# Patient Record
Sex: FEMALE | Race: WHITE | NOT HISPANIC OR LATINO | Employment: OTHER | URBAN - METROPOLITAN AREA
[De-identification: names, ages, dates, MRNs, and addresses within clinical notes are randomized per-mention and may not be internally consistent; named-entity substitution may affect disease eponyms.]

---

## 2017-01-27 ENCOUNTER — ANESTHESIA (OUTPATIENT)
Dept: GASTROENTEROLOGY | Facility: HOSPITAL | Age: 62
End: 2017-01-27

## 2017-01-27 ENCOUNTER — ANESTHESIA EVENT (OUTPATIENT)
Dept: GASTROENTEROLOGY | Facility: HOSPITAL | Age: 62
End: 2017-01-27

## 2017-01-27 ENCOUNTER — HOSPITAL ENCOUNTER (OUTPATIENT)
Facility: HOSPITAL | Age: 62
Setting detail: HOSPITAL OUTPATIENT SURGERY
Discharge: HOME | End: 2017-01-27
Attending: INTERNAL MEDICINE | Admitting: INTERNAL MEDICINE

## 2017-01-27 ENCOUNTER — ON CAMPUS - OUTPATIENT (OUTPATIENT)
Dept: URBAN - METROPOLITAN AREA HOSPITAL 114 | Facility: HOSPITAL | Age: 62
End: 2017-01-27

## 2017-01-27 VITALS
HEIGHT: 67 IN | SYSTOLIC BLOOD PRESSURE: 121 MMHG | HEART RATE: 53 BPM | OXYGEN SATURATION: 93 % | RESPIRATION RATE: 16 BRPM | DIASTOLIC BLOOD PRESSURE: 82 MMHG | TEMPERATURE: 98.3 F | WEIGHT: 184.1 LBS | BODY MASS INDEX: 28.9 KG/M2

## 2017-01-27 DIAGNOSIS — K22.4 DYSKINESIA OF ESOPHAGUS: ICD-10-CM

## 2017-01-27 DIAGNOSIS — F45.8 OTHER SOMATOFORM DISORDERS: ICD-10-CM

## 2017-01-27 DIAGNOSIS — R13.10 DYSPHAGIA: ICD-10-CM

## 2017-01-27 DIAGNOSIS — K29.50 UNSPECIFIED CHRONIC GASTRITIS WITHOUT BLEEDING: ICD-10-CM

## 2017-01-27 DIAGNOSIS — R13.14 DYSPHAGIA, PHARYNGOESOPHAGEAL PHASE: ICD-10-CM

## 2017-01-27 DIAGNOSIS — R12 HEARTBURN: ICD-10-CM

## 2017-01-27 DIAGNOSIS — K21.9 GERD (GASTROESOPHAGEAL REFLUX DISEASE): ICD-10-CM

## 2017-01-27 DIAGNOSIS — K22.2 ESOPHAGEAL OBSTRUCTION: ICD-10-CM

## 2017-01-27 DIAGNOSIS — K30 FUNCTIONAL DYSPEPSIA: ICD-10-CM

## 2017-01-27 PROCEDURE — 25010000002 MIDAZOLAM PER 1 MG: Performed by: ANESTHESIOLOGY

## 2017-01-27 PROCEDURE — 43239 EGD BIOPSY SINGLE/MULTIPLE: CPT | Performed by: INTERNAL MEDICINE

## 2017-01-27 PROCEDURE — 25010000002 FENTANYL CITRATE (PF) 100 MCG/2ML SOLUTION: Performed by: ANESTHESIOLOGY

## 2017-01-27 PROCEDURE — 25010000002 PROPOFOL 10 MG/ML EMULSION: Performed by: ANESTHESIOLOGY

## 2017-01-27 PROCEDURE — 88312 SPECIAL STAINS GROUP 1: CPT | Performed by: INTERNAL MEDICINE

## 2017-01-27 PROCEDURE — 43450 DILATE ESOPHAGUS 1/MULT PASS: CPT | Performed by: INTERNAL MEDICINE

## 2017-01-27 PROCEDURE — 88305 TISSUE EXAM BY PATHOLOGIST: CPT | Performed by: INTERNAL MEDICINE

## 2017-01-27 RX ORDER — MIDAZOLAM HYDROCHLORIDE 1 MG/ML
INJECTION INTRAMUSCULAR; INTRAVENOUS AS NEEDED
Status: DISCONTINUED | OUTPATIENT
Start: 2017-01-27 | End: 2017-01-27 | Stop reason: SURG

## 2017-01-27 RX ORDER — LIDOCAINE HYDROCHLORIDE 20 MG/ML
INJECTION, SOLUTION INFILTRATION; PERINEURAL AS NEEDED
Status: DISCONTINUED | OUTPATIENT
Start: 2017-01-27 | End: 2017-01-27 | Stop reason: SURG

## 2017-01-27 RX ORDER — PROPOFOL 10 MG/ML
VIAL (ML) INTRAVENOUS AS NEEDED
Status: DISCONTINUED | OUTPATIENT
Start: 2017-01-27 | End: 2017-01-27 | Stop reason: SURG

## 2017-01-27 RX ORDER — SODIUM CHLORIDE, SODIUM LACTATE, POTASSIUM CHLORIDE, CALCIUM CHLORIDE 600; 310; 30; 20 MG/100ML; MG/100ML; MG/100ML; MG/100ML
30 INJECTION, SOLUTION INTRAVENOUS CONTINUOUS PRN
Status: DISCONTINUED | OUTPATIENT
Start: 2017-01-27 | End: 2017-01-27 | Stop reason: HOSPADM

## 2017-01-27 RX ORDER — FENTANYL CITRATE 50 UG/ML
INJECTION, SOLUTION INTRAMUSCULAR; INTRAVENOUS AS NEEDED
Status: DISCONTINUED | OUTPATIENT
Start: 2017-01-27 | End: 2017-01-27 | Stop reason: SURG

## 2017-01-27 RX ADMIN — SODIUM CHLORIDE, POTASSIUM CHLORIDE, SODIUM LACTATE AND CALCIUM CHLORIDE 30 ML/HR: 600; 310; 30; 20 INJECTION, SOLUTION INTRAVENOUS at 07:36

## 2017-01-27 RX ADMIN — PROPOFOL 150 MG: 10 INJECTION, EMULSION INTRAVENOUS at 08:02

## 2017-01-27 RX ADMIN — LIDOCAINE HYDROCHLORIDE 40 MG: 20 INJECTION, SOLUTION INFILTRATION; PERINEURAL at 08:02

## 2017-01-27 RX ADMIN — FENTANYL CITRATE 50 MCG: 50 INJECTION INTRAMUSCULAR; INTRAVENOUS at 08:02

## 2017-01-27 RX ADMIN — MIDAZOLAM HYDROCHLORIDE 1 MG: 1 INJECTION, SOLUTION INTRAMUSCULAR; INTRAVENOUS at 08:02

## 2017-01-27 RX ADMIN — SODIUM CHLORIDE, POTASSIUM CHLORIDE, SODIUM LACTATE AND CALCIUM CHLORIDE: 600; 310; 30; 20 INJECTION, SOLUTION INTRAVENOUS at 08:00

## 2017-01-27 NOTE — CONSULTS
Consults    Patient Care Team:  Levon Barber MD as PCP - General  Levon Barber MD as PCP - Family Medicine    Chief complaint: gerd,     Subjective     History of Present Illness  Some swallowing issues food feels like it is hanging but goes down.  Review of Systems   All other systems reviewed and are negative.       Past Medical History   Diagnosis Date   • Abnormal LFTs    • Abnormal liver function tests 10/11/2016   • Carotid artery plaque      lifeline screen only not noted on formal screen at [facility] 2014   • Cervical disc disorder, unspecified, unspecified cervical region      djd/ddd c5-c6 fusion epiderals   • Essential tremor 10/17/2016   • History of peripheral neuropathy      sees Dr. Mantilla sx only on L side   • Liver hemangioma      on CT scan x 2 in 2015   • Localized osteoarthritis of hand    • Mucocele, appendix    • Osteoarthritis, localized, knee    • Osteopenia      noted on lifeline screen not seen on formal hip/L-spine DEXA in 11/2014   • PONV (postoperative nausea and vomiting)    • Post herpetic neuralgia    • Postherpetic neuralgia 10/17/2016   • Urge incontinence of urine    ,   Past Surgical History   Procedure Laterality Date   • Appendectomy       11/11/15 due to mucocoele   • Eye surgery       cataract surgery B 2012   • Cervical fusion     • Lasik       bilateral   • Hysterectomy       pt has ovaries MARION only   • Soft tissue cyst excision     • Ostectomy       navicular   • Gallbladder surgery       02/2016   • Skin biopsy     ,   Family History   Problem Relation Age of Onset   • Hypertension Mother    • Hypothyroidism Mother    • Leukemia Father      chronic lymphocytic   • Hypertension Father    • Breast cancer Sister    • Hypertension Sister    • Stroke Sister    • Depression Brother    • Hypertension Brother    • Colon cancer Maternal Grandmother    • Cancer Maternal Grandmother    ,   Social History   Substance Use Topics   • Smoking status: Former Smoker   • Smokeless  tobacco: None      Comment: 5 pack/year history; quit in 20's.    • Alcohol use Yes      Comment: 5 beers/ week (1-2 day/ time)   ,   Prescriptions Prior to Admission   Medication Sig Dispense Refill Last Dose   • aspirin 81 MG tablet Take  by mouth Daily.   1/21/2017   • B Complex-C (SUPER B COMPLEX/VITAMIN C PO) Take  by mouth.   1/21/2017   • Calcium Carb-Cholecalciferol (CALCIUM + D3) 600-200 MG-UNIT tablet Take  by mouth.   1/21/2017   • Cholecalciferol 400 UNITS tablet Take  by mouth.   1/21/2017   • Coenzyme Q10 (COQ10) 200 MG capsule Take  by mouth.   1/21/2017   • gabapentin (NEURONTIN) 800 MG tablet Take  by mouth 3 (Three) Times a Day.   1/26/2017   • Magnesium 100 MG tablet Take  by mouth.   1/21/2017   • Misc Natural Products (GLUCOS-CHONDROIT-MSM COMPLEX) tablet Take  by mouth.   1/21/2017   • MULTIPLE VITAMINS ESSENTIAL PO Take  by mouth.   1/21/2017   • Omega-3 Fatty Acids (FISH OIL) 1000 MG capsule capsule Take  by mouth Daily.   1/21/2017   , Scheduled Meds:   , Continuous Infusions:    lactated ringers 30 mL/hr Last Rate: 30 mL/hr (01/27/17 0736)   , PRN Meds:  •  lactated ringers and Allergies:  Review of patient's allergies indicates no known allergies.    Objective      Vital Signs  Temp:  [98.3 °F (36.8 °C)] 98.3 °F (36.8 °C)  Resp:  [18] 18  BP: (125)/(88) 125/88    Physical Exam   Constitutional: She is oriented to person, place, and time. She appears well-developed and well-nourished.   HENT:   Mouth/Throat: Oropharynx is clear and moist.   Eyes: Conjunctivae are normal.   Neck: Neck supple.   Cardiovascular: Normal rate and regular rhythm.    Pulmonary/Chest: Effort normal and breath sounds normal.   Abdominal: Soft. Bowel sounds are normal.   Neurological: She is alert and oriented to person, place, and time.   Skin: Skin is warm.   Psychiatric: She has a normal mood and affect.       Results Review:    None        Assessment/Plan     Active Problems:    * No active hospital problems.  *      Assessment:  (Globus  Dysphagia  gerd  bloating).     Plan:   (Upper endoscopy Risks, alternatives and benefits discussed with patient and patient is agreeable to proceed. ).       I discussed the patients findings and my recommendations with patient and nursing staff    Kofi Arredondo MD  01/27/17  8:21 AM

## 2017-01-27 NOTE — IP AVS SNAPSHOT
AFTER VISIT SUMMARY             Noemi Fowler           About your hospitalization     You were admitted on:  January 27, 2017 You last received care in the:  [facility] ENDO SUITES       Procedures & Surgeries      Procedure(s) (LRB):  ESOPHAGOGASTRODUODENOSCOPY WITH DILATATION GONZALEZ 52 FR,WITH BIOPSY (N/A)     1/27/2017     Surgeon(s):  Kofi Arredondo MD  -------------------      Medications    If you or your caregiver advised us that you are currently taking a medication and that medication is marked below as “Resume”, this simply indicates that we have reviewed those medications to make sure our new therapy recommendations do not interfere.  If you have concerns about medications other than those new ones which we are prescribing today, please consult the physician who prescribed them (or your primary physician).  Our review of your home medications is not meant to indicate that we are directing their use.             Your Medications      ASK your doctor about these medications     aspirin 81 MG tablet   Take  by mouth Daily.           Calcium + D3 600-200 MG-UNIT tablet   Take  by mouth.           Cholecalciferol 400 UNITS tablet   Take  by mouth.           CoQ10 200 MG capsule   Take  by mouth.           fish oil 1000 MG capsule capsule   Take  by mouth Daily.           gabapentin 800 MG tablet   Take  by mouth 3 (Three) Times a Day.   Commonly known as:  NEURONTIN           GLUCOS-CHONDROIT-MSM COMPLEX tablet   Take  by mouth.           Magnesium 100 MG tablet   Take  by mouth.           MULTIPLE VITAMINS ESSENTIAL PO   Take  by mouth.           SUPER B COMPLEX/VITAMIN C PO   Take  by mouth.                      Your Medications      ASK your doctor about these medications           Morning Noon Evening Bedtime As Needed    aspirin 81 MG tablet   Take  by mouth Daily.                                Calcium + D3 600-200 MG-UNIT tablet   Take  by mouth.                                Cholecalciferol 400 UNITS tablet   Take  by mouth.                                CoQ10 200 MG capsule   Take  by mouth.                                fish oil 1000 MG capsule capsule   Take  by mouth Daily.                                gabapentin 800 MG tablet   Take  by mouth 3 (Three) Times a Day.   Commonly known as:  NEURONTIN                                GLUCOS-CHONDROIT-MSM COMPLEX tablet   Take  by mouth.                                Magnesium 100 MG tablet   Take  by mouth.                                MULTIPLE VITAMINS ESSENTIAL PO   Take  by mouth.                                SUPER B COMPLEX/VITAMIN C PO   Take  by mouth.                                         Instructions for After Discharge        Discharge References/Attachments     GASTRITIS, ADULT, EASY-TO-READ (ENGLISH)    ESOPHAGOGASTRODUODENOSCOPY, CARE AFTER (ENGLISH)       Follow-ups for After Discharge        Scheduled Appointments     Mar 16, 2017  8:30 AM EDT   [facility] with LABDAYAN COLVIN   [facility] INTERNAL MEDICINE (--)    [address] [id number] [phone]            Mar 23, 2017  8:20 AM EDT   Office Visit with Levon Barber MD   [facility] INTERNAL MEDICINE (--)    [address] [address] [phone]           Arrive 15 minutes prior to appointment.              Gro[employer]ie Signup     Our records indicate that you have an active [employer] account.    You can view your After Visit Summary by going to [employer] and logging in with your Groopie username and password.  If you don't have a Groopie username and password but a parent or guardian has access to your record, the parent or guardian should login with their own Groopie username and password and access your record to view the After Visit Summary.    If you have questions, you can email [email or web address] or call [phone] to talk to  our MyChart staff.  Remember, MyChart is NOT to be used for urgent needs.  For medical emergencies, dial 911.           Summary of Your Hospitalization        Reason for Hospitalization     Your primary diagnosis was:  Not on File      Care Providers     Provider Service Role Specialty    Kofi Arredondo MD Gastroenterology Attending Provider Gastroenterology    Kofi Arredondo MD Gastroenterology Surgeon  Gastroenterology      Your Allergies  Date Reviewed: 1/27/2017    No active allergies      Pending Labs     Order Current Status    Tissue Exam Collected (01/27/17 0820)      Patient Belongings Returned     Document Return of Belongings Flowsheet     Were the patient bedside belongings sent home?   --   Belongings Retrieved from Security & Sent Home   --    Belongings Sent to Safe   --   Medications Retrieved from Pharmacy & Sent Home   --              More Information      Gastritis, Adult  Gastritis is soreness and puffiness (inflammation) of the lining of the stomach. If you do not get help, gastritis can cause bleeding and sores (ulcers) in the stomach.  HOME CARE   · Only take medicine as told by your doctor.  · If you were given antibiotic medicines, take them as told. Finish the medicines even if you start to feel better.  · Drink enough fluids to keep your pee (urine) clear or pale yellow.  · Avoid foods and drinks that make your problems worse. Foods you may want to avoid include:    Caffeine or alcohol.    Chocolate.    Mint.    Garlic and onions.    Spicy foods.    Citrus fruits, including oranges, sophia, or limes.    Food containing tomatoes, including sauce, chili, salsa, and pizza.    Fried and fatty foods.  · Eat small meals throughout the day instead of large meals.  GET HELP RIGHT AWAY IF:   · You have black or dark red poop (stools).  · You throw up (vomit) blood. It may look like coffee grounds.  · You cannot keep fluids down.  · Your belly (abdominal) pain gets worse.  · You have a  fever.  · You do not feel better after 1 week.  · You have any other questions or concerns.  MAKE SURE YOU:   · Understand these instructions.  · Will watch your condition.  · Will get help right away if you are not doing well or get worse.     This information is not intended to replace advice given to you by your health care provider. Make sure you discuss any questions you have with your health care provider.     Document Released: 06/05/2009 Document Revised: 03/11/2013 Document Reviewed: 01/30/2013  [employer] Interactive Patient Education ©2016 Elsevier Inc.          Esophagogastroduodenoscopy, Care After  Refer to this sheet in the next few weeks. These instructions provide you with information about caring for yourself after your procedure. Your health care provider may also give you more specific instructions. Your treatment has been planned according to current medical practices, but problems sometimes occur. Call your health care provider if you have any problems or questions after your procedure.  WHAT TO EXPECT AFTER THE PROCEDURE  After your procedure, it is typical to feel:  · Soreness in your throat.  · Pain with swallowing.  · Sick to your stomach (nauseous).  · Bloated.  · Dizzy.  · Fatigued.  HOME CARE INSTRUCTIONS  · Do not eat or drink anything until the numbing medicine (local anesthetic) has worn off and your gag reflex has returned. You will know that the local anesthetic has worn off when you can swallow comfortably.  · Do not drive or operate machinery until directed by your health care provider.  · Take medicines only as directed by your health care provider.  SEEK MEDICAL CARE IF:   · You cannot stop coughing.  · You are not urinating at all or less than usual.  SEEK IMMEDIATE MEDICAL CARE IF:  · You have difficulty swallowing.  · You cannot eat or drink.  · You have worsening throat or chest pain.  · You have dizziness or lightheadedness or you faint.  · You have nausea or vomiting.  · You  have chills.  · You have a fever.  · You have severe abdominal pain.  · You have black, tarry, or bloody stools.     This information is not intended to replace advice given to you by your health care provider. Make sure you discuss any questions you have with your health care provider.     Document Released: 12/04/2013 Document Revised: 01/08/2016 Document Reviewed: 12/04/2013  [facility] Interactive Patient Education ©2016 Elsevier Inc.            SYMPTOMS OF A STROKE    Call 911 or have someone take you to the Emergency Department if you have any of the following:    · Sudden numbness or weakness of your face, arm or leg especially on one side of the body  · Sudden confusion, diffiiculty speaking or trouble understanding   · Changes in your vision or loss of sight in one eye  · Sudden severe headache with no known cause  · sudden dizziness, trouble walking, loss of balance or coordination    It is important to seek emergency care right away if you have further stroke symptoms. If you get emergency help quickly, the powerful clot-dissolving medicines can reduce the disabilities caused by a stroke.     For more information:    American Stroke Association  2-445-4-STROKE  www.strokeassociation.org           IF YOU SMOKE OR USE TOBACCO PLEASE READ THE FOLLOWING:    Why is smoking bad for me?  Smoking increases the risk of heart disease, lung disease, vascular disease, stroke, and cancer.     If you smoke, STOP!    If you would like more information on quitting smoking, please visit the [employer] website: [email or web address]   This link will provide additional resources including the QUIT line and the Beat the Pack support groups.     For more information:    American Cancer Society  (257) 453-3221    American Heart Association  1-631.615.9022               YOU ARE THE MOST IMPORTANT FACTOR IN YOUR RECOVERY.     Follow all instructions carefully.     I have reviewed my  discharge instructions with my nurse, including the following information, if applicable:     Information about my illness and diagnosis   Follow up appointments (including lab draws)   Wound Care   Equipment Needs   Medications (new and continuing) along with side effects   Preventative information such as vaccines and smoking cessations   Diet   Pain   I know when to contact my Doctor's office or seek emergency care      I want my nurse to describe the side effects of my medications: YES NO   If the answer is no, I understand the side effects of my medications: YES NO   My nurse described the side effects of my medications in a way that I could understand: YES NO   I have taken my personal belongings and my own medications with me at discharge: YES NO            I have received this information and my questions have been answered. I have discussed any concerns I see with this plan with the nurse or physician. I understand these instructions.    Signature of Patient or Responsible Person: _____________________________________    Date: _________________  Time: __________________    Signature of Healthcare Provider: _______________________________________  Date: _________________  Time: __________________

## 2017-01-27 NOTE — PLAN OF CARE
Problem: Patient Care Overview (Adult)  Goal: Plan of Care Review  Outcome: Ongoing (interventions implemented as appropriate)    01/27/17 0708   Coping/Psychosocial Response Interventions   Plan Of Care Reviewed With patient   Patient Care Overview   Progress progress toward functional goals as expected       Goal: Adult Individualization and Mutuality  Outcome: Ongoing (interventions implemented as appropriate)  Goal: Discharge Needs Assessment  Outcome: Ongoing (interventions implemented as appropriate)    01/27/17 0708   Discharge Needs Assessment   Concerns To Be Addressed no discharge needs identified   Discharge Disposition home or self-care   Living Environment   Transportation Available car;family or friend will provide         Problem: GI Endoscopy (Adult)  Intervention: Monitor/Manage Procedure Recovery    01/27/17 0708   Respiratory Interventions   Airway/Ventilation Management airway patency maintained   Coping/Psychosocial Interventions   Environmental Support calm environment promoted   Activity   Activity Type activity adjusted per tolerance   Cardiac Interventions   Warming Thermoregulation Maintenance warm blankets applied       Intervention: Prevent Mercedes-procedural Injury    01/27/17 0708   Positioning   Positioning side lying, left   Head Of Bed (HOB) Position HOB elevated         Goal: Signs and Symptoms of Listed Potential Problems Will be Absent or Manageable (GI Endoscopy)  Outcome: Ongoing (interventions implemented as appropriate)    01/27/17 0708   GI Endoscopy   Problems Present (GI Endoscopy) none

## 2017-01-27 NOTE — ANESTHESIA POSTPROCEDURE EVALUATION
Patient: Noemi Fowler    Procedure Summary     Date Anesthesia Start Anesthesia Stop Room / Location    01/27/17 0815 0838  [facility] /  [facility]       Procedure Diagnosis Surgeon Provider    ESOPHAGOGASTRODUODENOSCOPY WITH DILATATION LISA 52 FR,WITH BIOPSY (N/A Esophagus) No diagnosis on file. Kofi Arredondo MD No responsible provider of record.          Anesthesia Type: MAC  Last vitals  /69 (01/27/17 0835)    Temp      Pulse 59 (01/27/17 0835)   Resp 16 (01/27/17 0835)    SpO2 94 % (01/27/17 0835)      Post Anesthesia Care and Evaluation    Patient location during evaluation: PACU  Patient participation: complete - patient participated  Level of consciousness: awake and alert  Pain management: adequate  Airway patency: patent  Anesthetic complications: No anesthetic complications    Cardiovascular status: acceptable  Respiratory status: acceptable  Hydration status: acceptable

## 2017-01-27 NOTE — ANESTHESIA PREPROCEDURE EVALUATION
Anesthesia Evaluation     Patient summary reviewed and Nursing notes reviewed    Airway   Mallampati: I  TM distance: <3 FB  Neck ROM: full  no difficulty expected  Dental - normal exam     Pulmonary - negative pulmonary ROS and normal exam   Cardiovascular - negative cardio ROS and normal exam    Neuro/Psych- negative ROS  GI/Hepatic/Renal/Endo - negative ROS     Musculoskeletal (-) negative ROS    Abdominal  - normal exam    Bowel sounds: normal.   Substance History - negative use     OB/GYN negative ob/gyn ROS         Other                             Anesthesia Plan    ASA 2     MAC     intravenous induction   Anesthetic plan and risks discussed with patient.

## 2017-01-30 LAB
CYTO UR: NORMAL
LAB AP CASE REPORT: NORMAL
Lab: NORMAL
PATH REPORT.FINAL DX SPEC: NORMAL
PATH REPORT.GROSS SPEC: NORMAL

## 2017-03-15 DIAGNOSIS — E78.5 DYSLIPIDEMIA: Primary | ICD-10-CM

## 2017-03-16 LAB
ALBUMIN SERPL-MCNC: 4.6 G/DL (ref 3.5–5.2)
ALBUMIN/GLOB SERPL: 1.7 G/DL
ALP SERPL-CCNC: 104 U/L (ref 39–117)
ALT SERPL-CCNC: 38 U/L (ref 1–33)
AST SERPL-CCNC: 38 U/L (ref 1–32)
BILIRUB SERPL-MCNC: 0.4 MG/DL (ref 0.1–1.2)
BUN SERPL-MCNC: 15 MG/DL (ref 8–23)
BUN/CREAT SERPL: 15 (ref 7–25)
CALCIUM SERPL-MCNC: 9.8 MG/DL (ref 8.6–10.5)
CHLORIDE SERPL-SCNC: 103 MMOL/L (ref 98–107)
CHOLEST SERPL-MCNC: 209 MG/DL (ref 0–200)
CO2 SERPL-SCNC: 28.6 MMOL/L (ref 22–29)
CREAT SERPL-MCNC: 1 MG/DL (ref 0.57–1)
GLOBULIN SER CALC-MCNC: 2.7 GM/DL
GLUCOSE SERPL-MCNC: 102 MG/DL (ref 65–99)
HDLC SERPL-MCNC: 36 MG/DL (ref 40–60)
LDLC SERPL CALC-MCNC: 146 MG/DL (ref 0–100)
POTASSIUM SERPL-SCNC: 4.3 MMOL/L (ref 3.5–5.2)
PROT SERPL-MCNC: 7.3 G/DL (ref 6–8.5)
SODIUM SERPL-SCNC: 146 MMOL/L (ref 136–145)
TRIGL SERPL-MCNC: 137 MG/DL (ref 0–150)
VLDLC SERPL CALC-MCNC: 27.4 MG/DL (ref 5–40)

## 2017-03-20 ENCOUNTER — OFFICE VISIT (OUTPATIENT)
Dept: INTERNAL MEDICINE | Facility: CLINIC | Age: 62
End: 2017-03-20

## 2017-03-20 VITALS
OXYGEN SATURATION: 95 % | BODY MASS INDEX: 28.41 KG/M2 | SYSTOLIC BLOOD PRESSURE: 120 MMHG | DIASTOLIC BLOOD PRESSURE: 88 MMHG | HEIGHT: 67 IN | TEMPERATURE: 99.6 F | RESPIRATION RATE: 18 BRPM | HEART RATE: 94 BPM | WEIGHT: 181 LBS

## 2017-03-20 DIAGNOSIS — R68.89 FLU-LIKE SYMPTOMS: ICD-10-CM

## 2017-03-20 DIAGNOSIS — R05.9 COUGH: ICD-10-CM

## 2017-03-20 DIAGNOSIS — J18.9 PNEUMONIA OF RIGHT LOWER LOBE DUE TO INFECTIOUS ORGANISM: Primary | ICD-10-CM

## 2017-03-20 DIAGNOSIS — R50.9 FEVER, UNSPECIFIED FEVER CAUSE: ICD-10-CM

## 2017-03-20 LAB
EXPIRATION DATE: NORMAL
FLUAV AG NPH QL: NEGATIVE
FLUBV AG NPH QL: NEGATIVE
HCT VFR BLDA CALC: 40.3 %
HGB BLDA-MCNC: 14.2 G/DL
INTERNAL CONTROL: NORMAL
LYMPHOCYTES # BLD: 36.4 %
Lab: NORMAL
MCH, POC: 31.1
MCHC, POC: 35.1
MCV, POC: 88.6
MONOCYTES # BLD: 5.9 %
PLATELET # BLD: 134 10*3/MM3
PMV BLD: 7 FL
POC NEUTROPHIL: 57.7 %
RBC, POC: 4.55
RDW, POC: 13.2
WBC # BLD: 8.3 10*3/UL

## 2017-03-20 PROCEDURE — 87804 INFLUENZA ASSAY W/OPTIC: CPT | Performed by: INTERNAL MEDICINE

## 2017-03-20 PROCEDURE — 99214 OFFICE O/P EST MOD 30 MIN: CPT | Performed by: INTERNAL MEDICINE

## 2017-03-20 PROCEDURE — 85025 COMPLETE CBC W/AUTO DIFF WBC: CPT | Performed by: INTERNAL MEDICINE

## 2017-03-20 PROCEDURE — 71020 XR CHEST PA AND LATERAL: CPT | Performed by: INTERNAL MEDICINE

## 2017-03-20 RX ORDER — SOD CHLOR,BICARB/SQUEEZ BOTTLE
1 PACKET, WITH RINSE DEVICE NASAL 3 TIMES DAILY
Qty: 1 EACH | Refills: 0
Start: 2017-03-20 | End: 2017-04-21

## 2017-03-20 RX ORDER — CEFDINIR 300 MG/1
300 CAPSULE ORAL 2 TIMES DAILY
Qty: 20 CAPSULE | Refills: 0 | Status: SHIPPED | OUTPATIENT
Start: 2017-03-20 | End: 2017-03-30

## 2017-03-20 RX ORDER — AZITHROMYCIN 250 MG/1
TABLET, FILM COATED ORAL
Qty: 6 TABLET | Refills: 0 | Status: SHIPPED | OUTPATIENT
Start: 2017-03-20 | End: 2017-04-21

## 2017-03-20 RX ORDER — FLUTICASONE PROPIONATE 50 MCG
2 SPRAY, SUSPENSION (ML) NASAL DAILY
Qty: 1 EACH | Refills: 1
Start: 2017-03-20 | End: 2017-04-19

## 2017-03-20 RX ORDER — GUAIFENESIN AND DEXTROMETHORPHAN HYDROBROMIDE 1200; 60 MG/1; MG/1
TABLET, EXTENDED RELEASE ORAL
Qty: 28 EACH | Refills: 0
Start: 2017-03-20 | End: 2017-04-21

## 2017-03-20 RX ORDER — PANTOPRAZOLE SODIUM 40 MG/1
TABLET, DELAYED RELEASE ORAL
Refills: 11 | COMMUNITY
Start: 2017-02-27 | End: 2018-12-26

## 2017-03-20 NOTE — PROGRESS NOTES
"Sore Throat (chest congestion); Cough (chills ); and Fever      HPI  Noemi Fowler is a 61 y.o. female RTC in acute care:   Started 3 days ago with sore throat, stuffy nose, cough and painful cough.  Voice is hoarse.  Feels like lots of mobile mucous in chest.  Coughing up yellow mucous from chest.  Has some sinus congestion.  Has some related HA pain.  Temp has gotten up to 102 when woke this AM.  No SOA overtly, but admits has lots of congestion.    Meds: TheraFlu, last dose this AM.   Sick contacts: grandkids \"have all this stuff\"      Review of Systems   Constitution: Positive for chills, fever and malaise/fatigue.   HENT: Positive for congestion, ear pain (R ear discomfort), hoarse voice and sore throat.    Eyes: Negative for pain and redness.   Respiratory: Positive for cough and sputum production. Negative for shortness of breath.    Musculoskeletal: Negative for myalgias.   Gastrointestinal: Negative for diarrhea, nausea and vomiting.       The following portions of the patient's history were reviewed and updated as appropriate: allergies, current medications, past medical history and problem list.      Current Outpatient Prescriptions:   •  aspirin 81 MG tablet, Take  by mouth Daily., Disp: , Rfl:   •  B Complex-C (SUPER B COMPLEX/VITAMIN C PO), Take  by mouth., Disp: , Rfl:   •  Calcium Carb-Cholecalciferol (CALCIUM + D3) 600-200 MG-UNIT tablet, Take  by mouth., Disp: , Rfl:   •  Cholecalciferol 400 UNITS tablet, Take  by mouth., Disp: , Rfl:   •  Coenzyme Q10 (COQ10) 200 MG capsule, Take  by mouth., Disp: , Rfl:   •  gabapentin (NEURONTIN) 800 MG tablet, Take  by mouth 3 (Three) Times a Day., Disp: , Rfl:   •  Magnesium 100 MG tablet, Take  by mouth., Disp: , Rfl:   •  Misc Natural Products (GLUCOS-CHONDROIT-MSM COMPLEX) tablet, Take  by mouth., Disp: , Rfl:   •  MULTIPLE VITAMINS ESSENTIAL PO, Take  by mouth., Disp: , Rfl:   •  Omega-3 Fatty Acids (FISH OIL) 1000 MG capsule capsule, Take  by mouth " "Daily., Disp: , Rfl:   •  pantoprazole (PROTONIX) 40 MG EC tablet, TAKE 1 TABLET BY MOUTH EVERY MORNING, Disp: , Rfl: 11  •  azithromycin (ZITHROMAX Z-JOHN) 250 MG tablet, Take 2 tablets the first day, then 1 tablet daily for 4 days., Disp: 6 tablet, Rfl: 0  •  cefdinir (OMNICEF) 300 MG capsule, Take 1 capsule by mouth 2 (Two) Times a Day for 10 days., Disp: 20 capsule, Rfl: 0  •  Dextromethorphan-Guaifenesin (MUCINEX DM MAXIMUM STRENGTH)  MG tablet sustained-release 12 hour, One PO BID, Disp: 28 each, Rfl: 0  •  fluticasone (FLONASE) 50 MCG/ACT nasal spray, 2 sprays into each nostril Daily for 30 days. Administer 2 sprays in each nostril daily, Disp: 1 each, Rfl: 1  •  Hypertonic Nasal Wash (SINUS RINSE BOTTLE KIT) pack, 1 bottle into each nostril 3 (Three) Times a Day., Disp: 1 each, Rfl: 0    Vitals:    03/20/17 1455   BP: 120/88   Pulse: 94   Resp: 18   Temp: 99.6 °F (37.6 °C)   SpO2: 95%   Weight: 181 lb (82.1 kg)   Height: 67\" (170.2 cm)         Physical Exam   Constitutional: She is oriented to person, place, and time. She appears well-developed and well-nourished. She does not have a sickly appearance. She appears ill. No distress.   HENT:   Head: Normocephalic and atraumatic.   Right Ear: Tympanic membrane, external ear and ear canal normal.   Left Ear: Tympanic membrane, external ear and ear canal normal.   Nose: Mucosal edema and rhinorrhea present. Right sinus exhibits no maxillary sinus tenderness and no frontal sinus tenderness. Left sinus exhibits no maxillary sinus tenderness and no frontal sinus tenderness.   Mouth/Throat: Uvula is midline. Posterior oropharyngeal edema (mild) present. No oropharyngeal exudate or posterior oropharyngeal erythema.   Eyes: Pupils are equal, round, and reactive to light. Right conjunctiva is injected (mild). Right conjunctiva has no hemorrhage. Left conjunctiva is injected (mild). Left conjunctiva has no hemorrhage.   Neck: Normal range of motion. Neck supple. "   Cardiovascular: Normal rate, regular rhythm and normal heart sounds.  Exam reveals no gallop and no friction rub.    No murmur heard.  Pulmonary/Chest: Effort normal. No respiratory distress. She has no wheezes. She has rales (R base).   Lymphadenopathy:     She has no cervical adenopathy.   Neurological: She is alert and oriented to person, place, and time. No cranial nerve deficit.   Psychiatric: She has a normal mood and affect. Her behavior is normal.   Vitals reviewed.    Results for orders placed or performed in visit on 03/20/17   POC Influenza A / B   Result Value Ref Range    Rapid Influenza A Ag negative     Rapid Influenza B Ag negative     Internal Control Passed Passed    Lot Number [id number]     Expiration Date 6061986    POC CBC With / Auto Diff   Result Value Ref Range    WBC 8.3     RBC 4.55     Hemoglobin 14.2 g/dL    Hematocrit 40.3 %    MCV 88.6     MCH 31.1 (H)     MCHC 35.1     RDW-CV 13.2     MPV 7.0 (L)     Platelets 134 (L) 10*3/mm3    Neutrophil Rel % 57.7 %    Monocyte Rel % 5.9 %    Lymphocyte Rel % 36.4 %     CXR: Cough,. Fever, R base rales; No prior for comparison  Subtle R base fluffy infiltrate  No effusion  No masses  No cardiomegaly    Assessment/ Plan  Diagnoses and all orders for this visit:    Pneumonia of right lower lobe due to infectious organism  -     Dextromethorphan-Guaifenesin (MUCINEX DM MAXIMUM STRENGTH)  MG tablet sustained-release 12 hour; One PO BID  -     Hypertonic Nasal Wash (SINUS RINSE BOTTLE KIT) pack; 1 bottle into each nostril 3 (Three) Times a Day.  -     fluticasone (FLONASE) 50 MCG/ACT nasal spray; 2 sprays into each nostril Daily for 30 days. Administer 2 sprays in each nostril daily  -     azithromycin (ZITHROMAX Z-JOHN) 250 MG tablet; Take 2 tablets the first day, then 1 tablet daily for 4 days.  -     cefdinir (OMNICEF) 300 MG capsule; Take 1 capsule by mouth 2 (Two) Times a Day for 10 days.    Flu-like symptoms  -     POC Influenza A / B  -      POC CBC With / Auto Diff  -     XR Chest PA & Lateral    Fever, unspecified fever cause  -     POC CBC With / Auto Diff  -     XR Chest PA & Lateral    Cough  -     POC CBC With / Auto Diff  -     XR Chest PA & Lateral  -     Dextromethorphan-Guaifenesin (MUCINEX DM MAXIMUM STRENGTH)  MG tablet sustained-release 12 hour; One PO BID    Other orders  -     pantoprazole (PROTONIX) 40 MG EC tablet; TAKE 1 TABLET BY MOUTH EVERY MORNING        Return in about 4 weeks (around 4/17/2017).      Discussion:  Noemi Fowler is a 61 y.o. female RTC in acute care (new issue to examiner) with 3 days of fever, cough, sinus congestion with R base rales on exam and subtle infiltrate on CXR R base.  Will tx as CAP today.  Pt has CURB-65 score of 0 (no BUN available) so will tx as OPT.    - Azithromycin course with Omnicef fot CAP coverage  - Mucinex DM BID  - OK TheraFlu BID as she is  - Nasal toilet with sinus rinse and add Flonase OTC for congestion  - Aggressive rest and hydration  - Pt to call or RTC if accelerating fever curve, SOA, double sickness, or failure to improve.      RTC 4 weeks f/u and repeat CXR.

## 2017-04-21 ENCOUNTER — OFFICE VISIT (OUTPATIENT)
Dept: INTERNAL MEDICINE | Facility: CLINIC | Age: 62
End: 2017-04-21

## 2017-04-21 VITALS
BODY MASS INDEX: 28.09 KG/M2 | DIASTOLIC BLOOD PRESSURE: 70 MMHG | HEIGHT: 67 IN | TEMPERATURE: 98.6 F | OXYGEN SATURATION: 98 % | SYSTOLIC BLOOD PRESSURE: 120 MMHG | HEART RATE: 70 BPM | WEIGHT: 179 LBS

## 2017-04-21 DIAGNOSIS — R25.2 NOCTURNAL MUSCLE CRAMPS: ICD-10-CM

## 2017-04-21 DIAGNOSIS — K21.9 GASTROESOPHAGEAL REFLUX DISEASE WITHOUT ESOPHAGITIS: ICD-10-CM

## 2017-04-21 DIAGNOSIS — K22.2 SCHATZKI'S RING: ICD-10-CM

## 2017-04-21 DIAGNOSIS — G25.0 ESSENTIAL TREMOR: ICD-10-CM

## 2017-04-21 DIAGNOSIS — J18.9 PNEUMONIA OF RIGHT LOWER LOBE DUE TO INFECTIOUS ORGANISM: ICD-10-CM

## 2017-04-21 DIAGNOSIS — G89.29 CHRONIC ABDOMINAL PAIN: ICD-10-CM

## 2017-04-21 DIAGNOSIS — E78.5 DYSLIPIDEMIA: Primary | ICD-10-CM

## 2017-04-21 DIAGNOSIS — R10.9 CHRONIC ABDOMINAL PAIN: ICD-10-CM

## 2017-04-21 PROBLEM — R05.9 COUGH: Status: RESOLVED | Noted: 2017-03-20 | Resolved: 2017-04-21

## 2017-04-21 PROBLEM — R50.9 FEVER: Status: RESOLVED | Noted: 2017-03-20 | Resolved: 2017-04-21

## 2017-04-21 PROBLEM — R68.89 FLU-LIKE SYMPTOMS: Status: RESOLVED | Noted: 2017-03-20 | Resolved: 2017-04-21

## 2017-04-21 PROCEDURE — 99214 OFFICE O/P EST MOD 30 MIN: CPT | Performed by: INTERNAL MEDICINE

## 2017-04-21 PROCEDURE — 71020 XR CHEST PA AND LATERAL: CPT | Performed by: INTERNAL MEDICINE

## 2017-04-21 RX ORDER — RANITIDINE HCL 75 MG
75 TABLET ORAL NIGHTLY
Qty: 30 TABLET | Refills: 3 | Status: SHIPPED | OUTPATIENT
Start: 2017-04-21 | End: 2017-12-18

## 2017-04-21 NOTE — PROGRESS NOTES
"Follow-up      HPI  Noemi Fowler is a 61 y.o. female RTC In f/u:   1.  PNA, RLL - dx'd on X-ray and at visit on 3/201/17.  Sx improved after 4 days with Abx course. Pt notes that lung sx resolved and no SOA or wheezing now.  No remaining cough or mucous production.  Feels back to regular self. Is here for f/u CXR as requested.   2. Dyslipidemia - LDL progressive on labs with pt off diet and exercise. Notes that diet and exercise are \"improving but not where I want to  Be\".  Watching diet more than in past.  Is exercising more, goal is 3x/ week. Is very active with work at home and at [employer].  Very active in yard.   3. > 3 months of epigastric vague pain after > 1 year of abdominal bloating not relieved by lap cristiano and not clarified by normal C-scope in 12/2015 - Pt had partial response to PPI trial, but persisted with sx and had EGD with a Schatzki ring and some spasming. Pt feels like sx are better on Rx dose of PPI, but still has \"yucky sx\" at night when lays down.  Has GI f/u upcoming with GI.   4.  Muscle cramps - notes more and more at night, \"they really hurt\".  Notes more recently. \"Reminds me of when I was pregnant\".  Wonders if related to Protonix. Is taking magnesium daily, not sure of dosing.   5. Essential Tremor - dx'd in 2015, after (-) DAMIAN scan. Sx persist and note \"is really bad when I hold a piece of paper\".  Has f/u with Dr. Gusman in neurology \"soon\".    Minimal tremor on exam. Monitor for now off meds.     Review of Systems   Constitution: Negative for chills, fever and malaise/fatigue.   HENT: Negative for odynophagia.    Respiratory: Negative for cough, shortness of breath, sputum production and wheezing.    Musculoskeletal: Positive for arthritis (hand B) and muscle cramps (in B legs, worse at night).   Gastrointestinal: Positive for abdominal pain (improved, but not totally gone) and heartburn. Negative for bloating, dysphagia, nausea and vomiting.   Neurological: Positive for " "tremors (at baseline).       The following portions of the patient's history were reviewed and updated as appropriate: allergies, current medications, past medical history and problem list.      Current Outpatient Prescriptions:   •  aspirin 81 MG tablet, Take  by mouth Daily., Disp: , Rfl:   •  B Complex-C (SUPER B COMPLEX/VITAMIN C PO), Take  by mouth., Disp: , Rfl:   •  Calcium Carb-Cholecalciferol (CALCIUM + D3) 600-200 MG-UNIT tablet, Take  by mouth., Disp: , Rfl:   •  Cholecalciferol 400 UNITS tablet, Take  by mouth., Disp: , Rfl:   •  Coenzyme Q10 (COQ10) 200 MG capsule, Take  by mouth., Disp: , Rfl:   •  gabapentin (NEURONTIN) 800 MG tablet, Take  by mouth 3 (Three) Times a Day., Disp: , Rfl:   •  Magnesium 100 MG tablet, Take  by mouth., Disp: , Rfl:   •  Misc Natural Products (GLUCOS-CHONDROIT-MSM COMPLEX) tablet, Take  by mouth., Disp: , Rfl:   •  MULTIPLE VITAMINS ESSENTIAL PO, Take  by mouth., Disp: , Rfl:   •  Omega-3 Fatty Acids (FISH OIL) 1000 MG capsule capsule, Take  by mouth Daily., Disp: , Rfl:   •  pantoprazole (PROTONIX) 40 MG EC tablet, TAKE 1 TABLET BY MOUTH EVERY MORNING, Disp: , Rfl: 11  •  raNITIdine (ZANTAC) 75 MG tablet, Take 1 tablet by mouth Every Night., Disp: 30 tablet, Rfl: 3    Vitals:    04/21/17 0824   BP: 120/70   Pulse: 70   Temp: 98.6 °F (37 °C)   SpO2: 98%   Weight: 179 lb (81.2 kg)   Height: 67\" (170.2 cm)         Physical Exam   Constitutional: She is oriented to person, place, and time. She appears well-developed and well-nourished. No distress.   HENT:   Head: Normocephalic and atraumatic.   Mouth/Throat: Oropharynx is clear and moist. No oropharyngeal exudate.   Neck: Normal range of motion. Neck supple. Carotid bruit is not present.   Cardiovascular: Normal rate, regular rhythm, normal heart sounds and intact distal pulses.  Exam reveals no gallop and no friction rub.    No murmur heard.  Pulmonary/Chest: Effort normal and breath sounds normal.   Abdominal: Soft. Bowel " sounds are normal. She exhibits no distension and no mass. There is no tenderness. There is no rebound and no guarding.   Musculoskeletal: She exhibits no edema.   Neurological: She is alert and oriented to person, place, and time. No cranial nerve deficit.   Psychiatric: She has a normal mood and affect. Her behavior is normal.   Vitals reviewed.    CXR: F/U PNA; compared to 3/20/17 CXR  Lungs clear  RLL appears to have cleared from prior CXR   No masses  No effusion  No cardiomegaly.       Assessment/ Plan  Diagnoses and all orders for this visit:    Dyslipidemia    Pneumonia of right lower lobe due to infectious organism    Essential tremor    Chronic abdominal pain    Schatzki's ring    Gastroesophageal reflux disease without esophagitis  -     raNITIdine (ZANTAC) 75 MG tablet; Take 1 tablet by mouth Every Night.    Nocturnal muscle cramps        Return in about 8 months (around 12/21/2017) for Annual physical.      Discussion:    Noemi Fowler is a 61 y.o. female RTC In f/u:   1.  PNA, RLL - dx'd on X-ray and at visit on 3/201/17.  Sx resolved and CXR is clear today.  No additional work-up needed.   2. Dyslipidemia - 10 yr CR 4.3% on 2016 labs. Pt is making diet and exercise changes and numbers improved on 3/2017 labs.  Pt to c/w efforts and will trend levels at next CPE.   3. > 3 months of epigastric vague pain after > 1 year of abdominal bloating not relieved by lap cristiano and appendectomy for mucocoele appendix, not clarified by normal C-scope in 12/2015. New dx of Schatzki ring with esophageal spasm on 2017 EGD. H.pylori negative.  Sx improved but not resolved on daily PPI and s/p dilation of esophagus. Pt has GI f/u upcoming but I have suggested addition of OTC Zantac qhs at this point to see if we can resolve sx.   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    4.  Muscle cramps - new issues today, progressive despite  magnesium daily. Advised to check dose and take 250-500mg magnesium daily and OK add low dose OTC K+.  Pt to add electrolyte containing fluid during instead of all water.  ?? Related to side effect of PPI as side effect is listed and sx are worse on med, however pt really needs PPI due to #3.  Call if sx not improved.   5. Essential Tremor - dx'd in 2015, after (-) DAMIAN scan. F/U with Dr. Gusman in neurology upcoming.   6. Neuropathy - tingling on whole L side of body and has had extensive w/u with Neurology, unclear etiology. On Gabapentin per neurology. F/U neurology as planned.   7. Polycythemia -noted on labs in 2016 with distinct trend since 2015, though resolved on 12/2016 labs. Trend CBC with manual smear next labs.     RTC

## 2017-05-22 ENCOUNTER — OFFICE (OUTPATIENT)
Dept: URBAN - METROPOLITAN AREA OTHER 6 | Facility: OTHER | Age: 62
End: 2017-05-22

## 2017-05-22 VITALS
SYSTOLIC BLOOD PRESSURE: 116 MMHG | WEIGHT: 178 LBS | HEIGHT: 67 IN | HEART RATE: 51 BPM | DIASTOLIC BLOOD PRESSURE: 78 MMHG

## 2017-05-22 DIAGNOSIS — K21.9 GASTRO-ESOPHAGEAL REFLUX DISEASE WITHOUT ESOPHAGITIS: ICD-10-CM

## 2017-05-22 DIAGNOSIS — R07.89 OTHER CHEST PAIN: ICD-10-CM

## 2017-05-22 PROCEDURE — 99212 OFFICE O/P EST SF 10 MIN: CPT | Performed by: INTERNAL MEDICINE

## 2017-05-22 RX ORDER — AMITRIPTYLINE HYDROCHLORIDE 75 MG/1
75 TABLET, FILM COATED ORAL
Qty: 30 | Refills: 6 | Status: ACTIVE
Start: 2017-05-22

## 2017-08-01 ENCOUNTER — APPOINTMENT (OUTPATIENT)
Dept: WOMENS IMAGING | Facility: HOSPITAL | Age: 62
End: 2017-08-01

## 2017-08-01 PROCEDURE — 77067 SCR MAMMO BI INCL CAD: CPT | Performed by: RADIOLOGY

## 2017-08-01 PROCEDURE — 77063 BREAST TOMOSYNTHESIS BI: CPT | Performed by: RADIOLOGY

## 2017-09-25 ENCOUNTER — OFFICE (OUTPATIENT)
Dept: URBAN - METROPOLITAN AREA OTHER 6 | Facility: OTHER | Age: 62
End: 2017-09-25

## 2017-09-25 VITALS
DIASTOLIC BLOOD PRESSURE: 72 MMHG | WEIGHT: 178 LBS | SYSTOLIC BLOOD PRESSURE: 128 MMHG | HEIGHT: 67 IN | HEART RATE: 64 BPM

## 2017-09-25 DIAGNOSIS — R07.89 OTHER CHEST PAIN: ICD-10-CM

## 2017-09-25 DIAGNOSIS — K21.9 GASTRO-ESOPHAGEAL REFLUX DISEASE WITHOUT ESOPHAGITIS: ICD-10-CM

## 2017-09-25 DIAGNOSIS — K22.4 DYSKINESIA OF ESOPHAGUS: ICD-10-CM

## 2017-09-25 PROCEDURE — 99213 OFFICE O/P EST LOW 20 MIN: CPT | Performed by: INTERNAL MEDICINE

## 2017-09-25 RX ORDER — AMITRIPTYLINE HYDROCHLORIDE 75 MG/1
75 TABLET, FILM COATED ORAL
Qty: 30 | Refills: 6 | Status: ACTIVE
Start: 2017-05-22

## 2017-12-07 DIAGNOSIS — N39.41 URGE INCONTINENCE OF URINE: ICD-10-CM

## 2017-12-07 DIAGNOSIS — E78.5 DYSLIPIDEMIA: ICD-10-CM

## 2017-12-07 DIAGNOSIS — K21.9 GASTROESOPHAGEAL REFLUX DISEASE WITHOUT ESOPHAGITIS: ICD-10-CM

## 2017-12-07 DIAGNOSIS — Z00.00 HEALTHCARE MAINTENANCE: Primary | ICD-10-CM

## 2017-12-12 LAB
ALBUMIN SERPL-MCNC: 4.7 G/DL (ref 3.5–5.2)
ALBUMIN/GLOB SERPL: 1.7 G/DL
ALP SERPL-CCNC: 101 U/L (ref 39–117)
ALT SERPL-CCNC: 23 U/L (ref 1–33)
APPEARANCE UR: CLEAR
AST SERPL-CCNC: 24 U/L (ref 1–32)
BACTERIA #/AREA URNS HPF: ABNORMAL /HPF
BASOPHILS # BLD AUTO: 0.02 10*3/MM3 (ref 0–0.2)
BASOPHILS NFR BLD AUTO: 0.3 % (ref 0–1.5)
BILIRUB SERPL-MCNC: 0.4 MG/DL (ref 0.1–1.2)
BILIRUB UR QL STRIP: NEGATIVE
BUN SERPL-MCNC: 16 MG/DL (ref 8–23)
BUN/CREAT SERPL: 18 (ref 7–25)
CALCIUM SERPL-MCNC: 9.2 MG/DL (ref 8.6–10.5)
CHLORIDE SERPL-SCNC: 103 MMOL/L (ref 98–107)
CHOLEST SERPL-MCNC: 196 MG/DL (ref 0–200)
CO2 SERPL-SCNC: 26.8 MMOL/L (ref 22–29)
COLOR UR: YELLOW
CREAT SERPL-MCNC: 0.89 MG/DL (ref 0.57–1)
CRYSTALS URNS MICRO: ABNORMAL
EOSINOPHIL # BLD AUTO: 0.05 10*3/MM3 (ref 0–0.7)
EOSINOPHIL NFR BLD AUTO: 0.8 % (ref 0.3–6.2)
EPI CELLS #/AREA URNS HPF: ABNORMAL /HPF
ERYTHROCYTE [DISTWIDTH] IN BLOOD BY AUTOMATED COUNT: 13.7 % (ref 11.7–13)
GFR SERPLBLD CREATININE-BSD FMLA CKD-EPI: 64 ML/MIN/1.73
GFR SERPLBLD CREATININE-BSD FMLA CKD-EPI: 78 ML/MIN/1.73
GLOBULIN SER CALC-MCNC: 2.8 GM/DL
GLUCOSE SERPL-MCNC: 95 MG/DL (ref 65–99)
GLUCOSE UR QL: NEGATIVE
HCT VFR BLD AUTO: 43.7 % (ref 35.6–45.5)
HDLC SERPL-MCNC: 51 MG/DL (ref 40–60)
HGB BLD-MCNC: 14.2 G/DL (ref 11.9–15.5)
HGB UR QL STRIP: NEGATIVE
IMM GRANULOCYTES # BLD: 0 10*3/MM3 (ref 0–0.03)
IMM GRANULOCYTES NFR BLD: 0 % (ref 0–0.5)
KETONES UR QL STRIP: NEGATIVE
LDLC SERPL CALC-MCNC: 131 MG/DL (ref 0–100)
LEUKOCYTE ESTERASE UR QL STRIP: NEGATIVE
LYMPHOCYTES # BLD AUTO: 2.35 10*3/MM3 (ref 0.9–4.8)
LYMPHOCYTES NFR BLD AUTO: 38.8 % (ref 19.6–45.3)
MCH RBC QN AUTO: 30.5 PG (ref 26.9–32)
MCHC RBC AUTO-ENTMCNC: 32.5 G/DL (ref 32.4–36.3)
MCV RBC AUTO: 93.8 FL (ref 80.5–98.2)
MICRO URNS: NORMAL
MICRO URNS: NORMAL
MONOCYTES # BLD AUTO: 0.66 10*3/MM3 (ref 0.2–1.2)
MONOCYTES NFR BLD AUTO: 10.9 % (ref 5–12)
NEUTROPHILS # BLD AUTO: 2.97 10*3/MM3 (ref 1.9–8.1)
NEUTROPHILS NFR BLD AUTO: 49.2 % (ref 42.7–76)
NITRITE UR QL STRIP: NEGATIVE
PH UR STRIP: 7.5 [PH] (ref 5–7.5)
PLATELET # BLD AUTO: 157 10*3/MM3 (ref 140–500)
POTASSIUM SERPL-SCNC: 4.5 MMOL/L (ref 3.5–5.2)
PROT SERPL-MCNC: 7.5 G/DL (ref 6–8.5)
PROT UR QL STRIP: NEGATIVE
RBC # BLD AUTO: 4.66 10*6/MM3 (ref 3.9–5.2)
RBC #/AREA URNS HPF: ABNORMAL /HPF
SODIUM SERPL-SCNC: 145 MMOL/L (ref 136–145)
SP GR UR: 1.01 (ref 1–1.03)
TRIGL SERPL-MCNC: 70 MG/DL (ref 0–150)
TSH SERPL DL<=0.005 MIU/L-ACNC: 3.47 MIU/ML (ref 0.27–4.2)
UNIDENT CRYS URNS QL MICRO: PRESENT /LPF
URINALYSIS REFLEX: NORMAL
UROBILINOGEN UR STRIP-MCNC: 0.2 MG/DL (ref 0.2–1)
VLDLC SERPL CALC-MCNC: 14 MG/DL (ref 5–40)
WBC # BLD AUTO: 6.05 10*3/MM3 (ref 4.5–10.7)
WBC #/AREA URNS HPF: ABNORMAL /HPF

## 2017-12-18 ENCOUNTER — OFFICE VISIT (OUTPATIENT)
Dept: INTERNAL MEDICINE | Facility: CLINIC | Age: 62
End: 2017-12-18

## 2017-12-18 VITALS
HEART RATE: 87 BPM | OXYGEN SATURATION: 98 % | BODY MASS INDEX: 28.41 KG/M2 | RESPIRATION RATE: 12 BRPM | WEIGHT: 181 LBS | TEMPERATURE: 98.3 F | SYSTOLIC BLOOD PRESSURE: 115 MMHG | HEIGHT: 67 IN | DIASTOLIC BLOOD PRESSURE: 80 MMHG

## 2017-12-18 DIAGNOSIS — Z86.69 HISTORY OF PERIPHERAL NEUROPATHY: ICD-10-CM

## 2017-12-18 DIAGNOSIS — M19.042 PRIMARY OSTEOARTHRITIS OF BOTH HANDS: ICD-10-CM

## 2017-12-18 DIAGNOSIS — K22.4 ESOPHAGEAL DYSMOTILITY: ICD-10-CM

## 2017-12-18 DIAGNOSIS — M19.041 PRIMARY OSTEOARTHRITIS OF BOTH HANDS: ICD-10-CM

## 2017-12-18 DIAGNOSIS — M50.90 DISC DISORDER OF CERVICAL REGION: ICD-10-CM

## 2017-12-18 DIAGNOSIS — G25.0 ESSENTIAL TREMOR: ICD-10-CM

## 2017-12-18 DIAGNOSIS — K21.9 GASTROESOPHAGEAL REFLUX DISEASE WITHOUT ESOPHAGITIS: ICD-10-CM

## 2017-12-18 DIAGNOSIS — M17.0 PRIMARY OSTEOARTHRITIS OF BOTH KNEES: ICD-10-CM

## 2017-12-18 DIAGNOSIS — Z00.00 HEALTHCARE MAINTENANCE: Primary | ICD-10-CM

## 2017-12-18 DIAGNOSIS — E78.5 DYSLIPIDEMIA: ICD-10-CM

## 2017-12-18 DIAGNOSIS — N39.41 URGE INCONTINENCE OF URINE: ICD-10-CM

## 2017-12-18 PROCEDURE — 90656 IIV3 VACC NO PRSV 0.5 ML IM: CPT | Performed by: INTERNAL MEDICINE

## 2017-12-18 PROCEDURE — 99396 PREV VISIT EST AGE 40-64: CPT | Performed by: INTERNAL MEDICINE

## 2017-12-18 PROCEDURE — 90471 IMMUNIZATION ADMIN: CPT | Performed by: INTERNAL MEDICINE

## 2017-12-18 RX ORDER — ACETAMINOPHEN 160 MG
2000 TABLET,DISINTEGRATING ORAL DAILY
Qty: 30 CAPSULE | Refills: 11
Start: 2017-12-18 | End: 2018-12-18

## 2017-12-18 RX ORDER — AMITRIPTYLINE HYDROCHLORIDE 50 MG/1
50 TABLET, FILM COATED ORAL NIGHTLY
COMMUNITY
End: 2018-10-05 | Stop reason: DRUGHIGH

## 2017-12-18 NOTE — PROGRESS NOTES
"Annual Exam (review of medical issues )      HPI  Noemi Fowler is a 62 y.o. female  RTC in yearly CPE, review of medical issues:   Has been doing well. Health has been good.   1. Dyslipidemia -  Diet and exercise are \"not as good as I want to\".  Has been really busy caring for mother, so less time to exercise. Babysitting for grandkids. Diet \"could be better\"/   2. Epigastric vague pain after > 1 year of abdominal bloating not relieved by lap cristiano and appendectomy for mucocoele appendix, not clarified by normal C-scope in 12/2015. New dx of Schatzki ring with esophageal spasm on 2017 EGD. H.pylori negativ - pt notes is still getting some sx, often when more acitve.  Often when active and bending down will get \"impulse to throw up\".  Feels like TCA med is there but not sure if helps that much.  Is still on PPI daily. \"I dont feel as bad, but is not where I want it to be\".    3. Essential Tremor - dx'd in 2015, after (-) DAMIAN scan. Saw Dr. Gusman in neurology and told to stop gabapentin by wean method.  \"The shakes are still there, but I dont feel any different\".    4. Neuropathy - tingling on whole L side of body and has had extensive w/u with Neurology, unclear etiology. Now off Gabapentin per neurology and really does not feel any different.  \"It is still there but it is not hindering me\".   5. Cervical spine DJD/ DDD s/p fusion at C5-C6 and s/p epidurals in past - CHAR. Has not pain.   6. OA, mild - in hands and knees. No issues other than enlarged joints in hands. Really not having pain issues.   7. Urge Incontinence - persists. \"It is not going well\". Tried PT but had poor timing as we too busy to do it. \"I am OK, I just use a bad and move on\".  Does not want to try meds.  Stopped PT as was \"not able to keep up\".      Review of Systems   Constitution: Negative for chills, fever, malaise/fatigue, weight gain and weight loss.   HENT: Negative for congestion, hearing loss, odynophagia and sore throat.  "   Eyes: Negative for discharge, double vision, pain and redness.        Last eye exam 11/2017; no contacts     Cardiovascular: Negative for chest pain, dyspnea on exertion, irregular heartbeat, near-syncope, palpitations and syncope.   Respiratory: Positive for cough (mild, recently, after working outside.  Sx of ~ 2 weeks. NO change in sx. ) and sputum production (small. ). Negative for shortness of breath.    Hematologic/Lymphatic: Negative for bleeding problem. Does not bruise/bleed easily.   Skin: Negative for rash and suspicious lesions.        Sees derm yearly     Musculoskeletal: Positive for arthritis. Negative for gout, joint pain, joint swelling and muscle cramps.   Gastrointestinal: Negative for constipation, diarrhea, dysphagia, heartburn, nausea and vomiting.   Genitourinary: Positive for bladder incontinence and urgency. Negative for dysuria, frequency and hematuria.   Neurological: Negative for dizziness, headaches and light-headedness.   Psychiatric/Behavioral: Negative for depression. The patient does not have insomnia and is not nervous/anxious.        Problem List:    Patient Active Problem List   Diagnosis   • Osteoarthritis of hand   • Urge incontinence of urine   • Carotid atherosclerosis   • Disc disorder of cervical region   • Osteoarthritis of knee   • Essential tremor   • Healthcare maintenance   • History of peripheral neuropathy   • Dyslipidemia   • Gastroesophageal reflux disease without esophagitis   • Schatzki's ring   • Nocturnal muscle cramps   • Esophageal dysmotility       Medical History:    Past Medical History:   Diagnosis Date   • Abnormal LFTs    • Abnormal liver function tests 10/11/2016   • Carotid artery plaque     lifeline screen only not noted on formal screen at [facility] [place] rads 2014   • Cervical disc disorder, unspecified, unspecified cervical region     djd/ddd c5-c6 fusion epiderals   • Essential tremor 10/17/2016   • Gastroesophageal reflux disease without esophagitis  4/21/2017    With noted Schatzki ring and esophageal spasm in 2017 EGD   • History of peripheral neuropathy     sees Dr. Jose Rafael drake only on L side   • Liver hemangioma     on CT scan x 2 in 2015   • Localized osteoarthritis of hand    • Mucocele, appendix    • Osteoarthritis, localized, knee    • Osteopenia     noted on lifeline screen not seen on formal hip/L-spine DEXA in 11/2014   • Pneumonia of right lower lobe due to infectious organism 3/20/2017    3/2017, resolved.    • PONV (postoperative nausea and vomiting)    • Post herpetic neuralgia    • Postherpetic neuralgia 10/17/2016   • Urge incontinence of urine         Social History:    Social History     Social History   • Marital status:      Spouse name: N/A   • Number of children: 2   • Years of education: N/A     Occupational History   • Retired      Social History Main Topics   • Smoking status: Former Smoker   • Smokeless tobacco: Never Used      Comment: 5 pack/year history; quit in 20's.    • Alcohol use Yes      Comment: 5 beers/ week (1-2 day/ time)   • Drug use: No   • Sexual activity: Yes     Partners: Male      Comment:  only; no hx STD's     Other Topics Concern   • Not on file     Social History Narrative    Diet - variable, some fruits >> veggies    Exercise - None    Caffeine - 1-2 cups coffee/ day       Family History:   Family History   Problem Relation Age of Onset   • Hypertension Mother    • Hypothyroidism Mother    • Dementia Mother    • Leukemia Father      chronic lymphocytic   • Hypertension Father    • Breast cancer Sister    • Hypertension Sister    • Stroke Sister    • Depression Brother    • Hypertension Brother    • Colon cancer Maternal Grandmother    • Cancer Maternal Grandmother    • No Known Problems Son        Surgical History:   Past Surgical History:   Procedure Laterality Date   • APPENDECTOMY      11/11/15 due to mucocoele   • CERVICAL FUSION     • ENDOSCOPY N/A 1/27/2017    Procedure:  ESOPHAGOGASTRODUODENOSCOPY WITH DILATATION LISA 52 FR,WITH BIOPSY;  Surgeon: Kofi Arredondo MD;  Location: [facility] ENDOSCOPY;  Service:    • EYE SURGERY      cataract surgery B 2012   • GALLBLADDER SURGERY      02/2016   • HYSTERECTOMY      pt has ovaries MARION only   • LASIK      bilateral   • OSTECTOMY      navicular   • SKIN BIOPSY     • SOFT TISSUE CYST EXCISION           Current Outpatient Prescriptions:   •  amitriptyline (ELAVIL) 50 MG tablet, 50 mg Every Night., Disp: , Rfl:   •  aspirin 81 MG tablet, Take  by mouth Daily., Disp: , Rfl:   •  B Complex-C (SUPER B COMPLEX/VITAMIN C PO), Take  by mouth., Disp: , Rfl:   •  Calcium Carb-Cholecalciferol (CALCIUM + D3) 600-200 MG-UNIT tablet, Take  by mouth., Disp: , Rfl:   •  Coenzyme Q10 (COQ10) 200 MG capsule, Take  by mouth., Disp: , Rfl:   •  Magnesium 100 MG tablet, Take  by mouth., Disp: , Rfl:   •  Misc Natural Products (GLUCOS-CHONDROIT-MSM COMPLEX) tablet, Take  by mouth., Disp: , Rfl:   •  MULTIPLE VITAMINS ESSENTIAL PO, Take  by mouth., Disp: , Rfl:   •  Omega-3 Fatty Acids (FISH OIL) 1000 MG capsule capsule, Take  by mouth Daily., Disp: , Rfl:   •  pantoprazole (PROTONIX) 40 MG EC tablet, TAKE 1 TABLET BY MOUTH EVERY MORNING, Disp: , Rfl: 11  •  Cholecalciferol (VITAMIN D3) 2000 units capsule, Take 1 capsule by mouth Daily., Disp: 30 capsule, Rfl: 11    Vitals:    12/18/17 0917   BP: 115/80   Pulse: 87   Resp: 12   Temp: 98.3 °F (36.8 °C)   SpO2: 98%       Physical Exam   Constitutional: She is oriented to person, place, and time. She appears well-developed and well-nourished. No distress.   HENT:   Head: Normocephalic and atraumatic.   Right Ear: Hearing, tympanic membrane, external ear and ear canal normal.   Left Ear: Hearing, tympanic membrane, external ear and ear canal normal.   Nose: Nose normal.   Mouth/Throat: Uvula is midline, oropharynx is clear and moist and mucous membranes are normal. Abnormal dentition (has dental device on lower  teeth). No oropharyngeal exudate, posterior oropharyngeal edema or posterior oropharyngeal erythema.   Eyes: Conjunctivae, EOM and lids are normal. Pupils are equal, round, and reactive to light.   Neck: Trachea normal, normal range of motion and full passive range of motion without pain. Neck supple. Carotid bruit is not present. No thyroid mass and no thyromegaly present.   Cardiovascular: Normal rate, regular rhythm, S1 normal, S2 normal, normal heart sounds and intact distal pulses.  Exam reveals no gallop and no friction rub.    No murmur heard.  Pulses:       Radial pulses are 2+ on the right side, and 2+ on the left side.        Dorsalis pedis pulses are 2+ on the right side, and 2+ on the left side.        Posterior tibial pulses are 2+ on the right side, and 2+ on the left side.   Pulmonary/Chest: Effort normal and breath sounds normal. No respiratory distress. She has no wheezes. She has no rhonchi. She has no rales.   Abdominal: Soft. Normal appearance and bowel sounds are normal. She exhibits no distension and no mass. There is no hepatosplenomegaly. There is no tenderness. There is no rebound and no guarding.   Musculoskeletal: Normal range of motion. She exhibits no edema.       Vascular Status -  Her exam exhibits right foot vasculature abnormal and no right foot edema. Her exam exhibits left foot vasculature abnormal and no left foot edema.  Lymphadenopathy:     She has no cervical adenopathy.     She has no axillary adenopathy.        Right: No inguinal adenopathy present.        Left: No inguinal adenopathy present.   Neurological: She is alert and oriented to person, place, and time. She has normal strength and normal reflexes. She displays tremor. No cranial nerve deficit or sensory deficit. She exhibits normal muscle tone. Seizure activity: faint action tremor (high freq, low amp) R >> L hand. Gait normal.   Skin: Skin is warm and dry. No rash noted.        Psychiatric: She has a normal mood and  affect. Her behavior is normal. Cognition and memory are normal.   Vitals reviewed.      Assessment/ Plan  Diagnoses and all orders for this visit:    Healthcare maintenance  -     Flu Vaccine Quad PF >18YR    Disc disorder of cervical region    Dyslipidemia    Essential tremor    Gastroesophageal reflux disease without esophagitis    History of peripheral neuropathy    Primary osteoarthritis of both hands    Primary osteoarthritis of both knees    Urge incontinence of urine    Esophageal dysmotility    Other orders  -     amitriptyline (ELAVIL) 50 MG tablet; 50 mg Every Night.  -     Cholecalciferol (VITAMIN D3) 2000 units capsule; Take 1 capsule by mouth Daily.        Return in about 1 year (around 12/18/2018) for Annual physical.      Discussion:  oNemi Fowler is a 62 y.o. female  RTC in yearly CPE, review of medical issues:   Has been doing well. Health has been good.   1. Dyslipidemia -  Numbers have improved this year after statins deferred with low CR in 2016.  TLC diet and exercise recommended.   3. Epigastric vague pain after > 1 year of abdominal bloating not relieved by lap cristiano and appendectomy for mucocoele appendix, not clarified by normal C-scope in 12/2015. New dx of Schatzki ring with esophageal spasm on 2017 EGD and suspected GERD element.  - pt is doing well from GERD standpoint on PPI, but still some dysmotility issues. On TCA daily and not sure if helping. Has GI f/u upcoming.  No red flag sx today.    4. Essential Tremor - dx'd in 2015, after (-) DAMIAN scan. No off gabapentin with no change in sx.   5. Neuropathy - tingling on whole L side of body and has had extensive w/u with Neurology, unclear etiology. Now off Gabapentin with no change in sx.   6. Cervical spine DJD/ DDD s/p fusion at C5-C6 and s/p epidurals in past - CHAR.   7. OA, mild - in hands and knees. No pain issues.   8. Urge Incontinence - unable to complete PT, declines meds. Pt to readdress with PT when has more time. U/A  clear today.   9. Hx of Polycythemia -noted on labs in 2016 with distinct trend since 2015, though resolved on 12/2016 and remains resolved on labs today.   10. HM - labs d/w pt; Flu - today; Tdap/ Zostavax - UTD; C-scope 12/2015 (-) --> f/u 5 years; Pap/ Mammo OK ~7/2016 with Gyn, f/u yearly; DEXA normal 7/2016 per Gyn --> 5 years; Hep C Ab (-) 2014; AAA (-) on 2015 CT A/P; add back exercise    RTC one year CPE, F labs prior

## 2018-02-12 ENCOUNTER — OFFICE (OUTPATIENT)
Dept: URBAN - METROPOLITAN AREA OTHER 6 | Facility: OTHER | Age: 63
End: 2018-02-12

## 2018-02-12 VITALS
SYSTOLIC BLOOD PRESSURE: 120 MMHG | DIASTOLIC BLOOD PRESSURE: 76 MMHG | HEIGHT: 67 IN | WEIGHT: 183 LBS | HEART RATE: 80 BPM

## 2018-02-12 DIAGNOSIS — K22.4 DYSKINESIA OF ESOPHAGUS: ICD-10-CM

## 2018-02-12 DIAGNOSIS — R11.0 NAUSEA: ICD-10-CM

## 2018-02-12 DIAGNOSIS — R07.89 OTHER CHEST PAIN: ICD-10-CM

## 2018-02-12 PROCEDURE — 99212 OFFICE O/P EST SF 10 MIN: CPT | Performed by: INTERNAL MEDICINE

## 2018-02-12 RX ORDER — AMITRIPTYLINE HYDROCHLORIDE 75 MG/1
75 TABLET, FILM COATED ORAL
Qty: 30 | Refills: 6 | Status: ACTIVE
Start: 2017-05-22

## 2018-02-12 RX ORDER — RANITIDINE 300 MG/1
300 TABLET ORAL
Qty: 30 | Refills: 11 | Status: COMPLETED
Start: 2018-02-12 | End: 2018-08-14

## 2018-08-14 ENCOUNTER — OFFICE (OUTPATIENT)
Dept: URBAN - METROPOLITAN AREA CLINIC 66 | Facility: CLINIC | Age: 63
End: 2018-08-14

## 2018-08-14 VITALS
HEART RATE: 80 BPM | DIASTOLIC BLOOD PRESSURE: 70 MMHG | HEIGHT: 67 IN | WEIGHT: 183 LBS | SYSTOLIC BLOOD PRESSURE: 112 MMHG

## 2018-08-14 DIAGNOSIS — K22.4 DYSKINESIA OF ESOPHAGUS: ICD-10-CM

## 2018-08-14 DIAGNOSIS — R07.89 OTHER CHEST PAIN: ICD-10-CM

## 2018-08-14 DIAGNOSIS — K21.9 GASTRO-ESOPHAGEAL REFLUX DISEASE WITHOUT ESOPHAGITIS: ICD-10-CM

## 2018-08-14 PROCEDURE — 99212 OFFICE O/P EST SF 10 MIN: CPT | Performed by: INTERNAL MEDICINE

## 2018-08-16 ENCOUNTER — APPOINTMENT (OUTPATIENT)
Dept: WOMENS IMAGING | Facility: HOSPITAL | Age: 63
End: 2018-08-16

## 2018-08-16 PROCEDURE — 77067 SCR MAMMO BI INCL CAD: CPT | Performed by: RADIOLOGY

## 2018-10-05 ENCOUNTER — OFFICE VISIT (OUTPATIENT)
Dept: INTERNAL MEDICINE | Facility: CLINIC | Age: 63
End: 2018-10-05

## 2018-10-05 VITALS
HEART RATE: 71 BPM | SYSTOLIC BLOOD PRESSURE: 112 MMHG | BODY MASS INDEX: 28.09 KG/M2 | HEIGHT: 67 IN | TEMPERATURE: 98.5 F | OXYGEN SATURATION: 98 % | WEIGHT: 179 LBS | DIASTOLIC BLOOD PRESSURE: 88 MMHG

## 2018-10-05 DIAGNOSIS — J02.9 ACUTE PHARYNGITIS, UNSPECIFIED ETIOLOGY: Primary | ICD-10-CM

## 2018-10-05 LAB
EXPIRATION DATE: NORMAL
EXPIRATION DATE: NORMAL
FLUAV AG NPH QL: NEGATIVE
FLUBV AG NPH QL: NEGATIVE
INTERNAL CONTROL: NORMAL
INTERNAL CONTROL: NORMAL
Lab: NORMAL
Lab: NORMAL
S PYO AG THROAT QL: NEGATIVE

## 2018-10-05 PROCEDURE — 87804 INFLUENZA ASSAY W/OPTIC: CPT | Performed by: INTERNAL MEDICINE

## 2018-10-05 PROCEDURE — 99213 OFFICE O/P EST LOW 20 MIN: CPT | Performed by: INTERNAL MEDICINE

## 2018-10-05 PROCEDURE — 87880 STREP A ASSAY W/OPTIC: CPT | Performed by: INTERNAL MEDICINE

## 2018-10-05 RX ORDER — IBUPROFEN 200 MG
TABLET ORAL
Qty: 30 TABLET | Refills: 0
Start: 2018-10-05 | End: 2019-10-23

## 2018-10-05 RX ORDER — AMITRIPTYLINE HYDROCHLORIDE 75 MG/1
TABLET, FILM COATED ORAL
Refills: 6 | COMMUNITY
Start: 2018-09-21 | End: 2018-12-26

## 2018-10-05 RX ORDER — RANITIDINE 300 MG/1
TABLET ORAL
Refills: 11 | COMMUNITY
Start: 2018-09-10 | End: 2018-12-26

## 2018-10-05 NOTE — PROGRESS NOTES
"Sore Throat      HPI  Noemi Fowler is a 63 y.o. female RTC in acute care:   Started with sore throat last night about 1AM, came on suddenly.  NO fevers.  Did not check at home. No cough.  Lungs feel OK. No nasal congestion. No post nasal gtt.  \"Just a sore throat\". \"it hurts to swallow\".    Meds: None  Sick contacts: None, does watch grandSaluspotds  Had flu shot in 9/2018.     Review of Systems   Constitution: Negative for chills, fever and malaise/fatigue.   HENT: Positive for sore throat. Negative for congestion and ear pain.    Respiratory: Negative for cough, shortness of breath and sputum production.        The following portions of the patient's history were reviewed and updated as appropriate: allergies, current medications, past medical history and problem list.      Current Outpatient Prescriptions:   •  amitriptyline (ELAVIL) 75 MG tablet, TAKE 1 /2 TABLET BY MOUTH AT BEDTIME FOR 30 DAYS, Disp: , Rfl: 6  •  aspirin 81 MG tablet, Take  by mouth Daily., Disp: , Rfl:   •  B Complex-C (SUPER B COMPLEX/VITAMIN C PO), Take  by mouth., Disp: , Rfl:   •  Calcium Carb-Cholecalciferol (CALCIUM + D3) 600-200 MG-UNIT tablet, Take  by mouth., Disp: , Rfl:   •  Cholecalciferol (VITAMIN D3) 2000 units capsule, Take 1 capsule by mouth Daily., Disp: 30 capsule, Rfl: 11  •  Coenzyme Q10 (COQ10) 200 MG capsule, Take  by mouth., Disp: , Rfl:   •  Magnesium 100 MG tablet, Take  by mouth., Disp: , Rfl:   •  Misc Natural Products (GLUCOS-CHONDROIT-MSM COMPLEX) tablet, Take  by mouth., Disp: , Rfl:   •  MULTIPLE VITAMINS ESSENTIAL PO, Take  by mouth., Disp: , Rfl:   •  Omega-3 Fatty Acids (FISH OIL) 1000 MG capsule capsule, Take  by mouth Daily., Disp: , Rfl:   •  pantoprazole (PROTONIX) 40 MG EC tablet, TAKE 1 TABLET BY MOUTH EVERY MORNING, Disp: , Rfl: 11  •  raNITIdine (ZANTAC) 300 MG tablet, Take 1/2 tablet at night, Disp: , Rfl: 11  •  ibuprofen (MOTRIN IB) 200 MG tablet, 3 tabs PO q 8 hours, Disp: 30 tablet, Rfl: " "0    Vitals:    10/05/18 1053   BP: 112/88   Pulse: 71   Temp: 98.5 °F (36.9 °C)   SpO2: 98%   Weight: 81.2 kg (179 lb)   Height: 170.2 cm (67.01\")         Physical Exam   Constitutional: She is oriented to person, place, and time. She appears well-developed and well-nourished. She does not have a sickly appearance. She does not appear ill. No distress.   HENT:   Head: Normocephalic and atraumatic.   Right Ear: Tympanic membrane, external ear and ear canal normal.   Left Ear: Tympanic membrane, external ear and ear canal normal.   Nose: No mucosal edema or rhinorrhea. Right sinus exhibits no maxillary sinus tenderness and no frontal sinus tenderness. Left sinus exhibits no maxillary sinus tenderness and no frontal sinus tenderness.   Mouth/Throat: Uvula is midline. Mucous membranes are not pale, not dry and not cyanotic. No oral lesions. Posterior oropharyngeal erythema (mild) present. No oropharyngeal exudate or posterior oropharyngeal edema.   Eyes: Pupils are equal, round, and reactive to light. Conjunctivae are normal. Right eye exhibits no discharge. Left eye exhibits no discharge.   Neck: Normal range of motion. Neck supple.   Cardiovascular: Normal rate, regular rhythm and normal heart sounds.    Pulmonary/Chest: Effort normal and breath sounds normal. No respiratory distress. She has no wheezes. She has no rales.   Lymphadenopathy:     She has no cervical adenopathy.   Neurological: She is alert and oriented to person, place, and time. No cranial nerve deficit.   Psychiatric: She has a normal mood and affect. Her behavior is normal.   Vitals reviewed.    Results for orders placed or performed in visit on 10/05/18   POC Influenza A / B   Result Value Ref Range    Rapid Influenza A Ag Negative     Rapid Influenza B Ag Negative     Internal Control Passed Passed    Lot Number [id number]     Expiration Date 12/31/2019    POC Rapid Strep A   Result Value Ref Range    Rapid Strep A Screen Negative Negative, VALID, " INVALID, Not Performed    Internal Control Passed Passed    Lot Number [id number]     Expiration Date 03/31/2020        Assessment/ Plan  Diagnoses and all orders for this visit:    Acute pharyngitis, unspecified etiology  -     POC Influenza A / B  -     POC Rapid Strep A  -     ibuprofen (MOTRIN IB) 200 MG tablet; 3 tabs PO q 8 hours    Other orders  -     raNITIdine (ZANTAC) 300 MG tablet; Take 1/2 tablet at night  -     amitriptyline (ELAVIL) 75 MG tablet; TAKE 1 /2 TABLET BY MOUTH AT BEDTIME FOR 30 DAYS        Return for Next scheduled follow up.      Discussion:  Noemi Fowler is a 63 y.o. female RTC In acute care (new issue to examiner) with ~12 hours of sore throat. Exam is largely benign except for noted mild posterior pharyngeal erythema.  FLu and strep swab were negative.  I suspect this is early viral infection and suspect sc will worsen before they get better.  I have advised pt to use Ibuprofen 600mg TID PRN for now for throat pain and anti-inflammatory effect. Salt water gargles.  Pt to call if any T> 100.5, new sx, SOA, or failure to improve.

## 2018-10-08 ENCOUNTER — TELEPHONE (OUTPATIENT)
Dept: INTERNAL MEDICINE | Facility: CLINIC | Age: 63
End: 2018-10-08

## 2018-10-08 NOTE — TELEPHONE ENCOUNTER
Pt was seen 10/5/2018 for sore throat , strep and flu came out negative .Since pt was seen last Friday her sore throat has worsened , she is experiencing  Headaches, nasal drainage and congestion .Pt stated she tried the recommend suggeston given during ov .Please advise     [phone]

## 2018-10-08 NOTE — TELEPHONE ENCOUNTER
"I returned pt call personally.  Notes \"I am terrible\". Throat pain is severe. Eating and drinking is hard.  Has nasal congestion, HA, ear pain, and cough.  NO fevers. No SOA.  No swollen nodes. Pt feels like the meds used are not helping much. I offered that there was not much more to give her for tx other than OTC options. I do not think Abx would be helpful for what sounds, appeared late last week, to be a viral URI.  Pt agreed to call in AM to be seen again if does not feel better and I offered her appt.  Will await her call.   "

## 2018-10-09 ENCOUNTER — OFFICE VISIT (OUTPATIENT)
Dept: INTERNAL MEDICINE | Facility: CLINIC | Age: 63
End: 2018-10-09

## 2018-10-09 VITALS
DIASTOLIC BLOOD PRESSURE: 76 MMHG | HEIGHT: 67 IN | SYSTOLIC BLOOD PRESSURE: 122 MMHG | WEIGHT: 179 LBS | OXYGEN SATURATION: 98 % | BODY MASS INDEX: 28.09 KG/M2 | HEART RATE: 91 BPM | TEMPERATURE: 98.5 F

## 2018-10-09 DIAGNOSIS — J02.9 SORE THROAT: ICD-10-CM

## 2018-10-09 DIAGNOSIS — J20.9 ACUTE BRONCHITIS, UNSPECIFIED ORGANISM: ICD-10-CM

## 2018-10-09 DIAGNOSIS — J01.90 ACUTE SINUSITIS, RECURRENCE NOT SPECIFIED, UNSPECIFIED LOCATION: ICD-10-CM

## 2018-10-09 DIAGNOSIS — J02.9 ACUTE PHARYNGITIS, UNSPECIFIED ETIOLOGY: Primary | ICD-10-CM

## 2018-10-09 LAB
EXPIRATION DATE: NORMAL
FLUAV AG NPH QL: NEGATIVE
FLUBV AG NPH QL: NEGATIVE
HCT VFR BLDA CALC: 45.3 %
HETEROPH AB SER QL LA: NEGATIVE
HGB BLDA-MCNC: 14.9 G/DL
INTERNAL CONTROL: NORMAL
LYMPHOCYTES # BLD: 39.3 %
Lab: NORMAL
MCH, POC: 28.4
MCHC, POC: 32.9
MCV, POC: 86.5
MONOCYTES # BLD: 5.8 %
PLATELET # BLD: 152 10*3/MM3
PMV BLD: 6.5 FL
POC NEUTROPHIL: 54.9 %
RBC, POC: 5.23
RDW, POC: 13.9
S PYO AG THROAT QL: NEGATIVE
WBC # BLD: 5.5 10*3/UL

## 2018-10-09 PROCEDURE — 87804 INFLUENZA ASSAY W/OPTIC: CPT | Performed by: INTERNAL MEDICINE

## 2018-10-09 PROCEDURE — 85025 COMPLETE CBC W/AUTO DIFF WBC: CPT | Performed by: INTERNAL MEDICINE

## 2018-10-09 PROCEDURE — 86308 HETEROPHILE ANTIBODY SCREEN: CPT | Performed by: INTERNAL MEDICINE

## 2018-10-09 PROCEDURE — 87880 STREP A ASSAY W/OPTIC: CPT | Performed by: INTERNAL MEDICINE

## 2018-10-09 PROCEDURE — 99214 OFFICE O/P EST MOD 30 MIN: CPT | Performed by: INTERNAL MEDICINE

## 2018-10-09 RX ORDER — PROMETHAZINE HYDROCHLORIDE AND CODEINE PHOSPHATE 6.25; 1 MG/5ML; MG/5ML
5 SYRUP ORAL NIGHTLY PRN
Qty: 60 ML | Refills: 0 | Status: SHIPPED | OUTPATIENT
Start: 2018-10-09 | End: 2018-12-26

## 2018-10-09 RX ORDER — ACETAMINOPHEN 500 MG
TABLET ORAL
Qty: 30 TABLET | Refills: 0
Start: 2018-10-09 | End: 2018-12-26

## 2018-10-09 RX ORDER — AZITHROMYCIN 250 MG/1
TABLET, FILM COATED ORAL
Qty: 6 TABLET | Refills: 0 | Status: SHIPPED | OUTPATIENT
Start: 2018-10-09 | End: 2018-12-26

## 2018-10-09 RX ORDER — AZITHROMYCIN 250 MG/1
TABLET, FILM COATED ORAL
Qty: 6 TABLET | Refills: 0 | Status: SHIPPED | OUTPATIENT
Start: 2018-10-09 | End: 2018-10-09 | Stop reason: SDUPTHER

## 2018-10-09 NOTE — PROGRESS NOTES
"Sore Throat; Headache; and Cough      HPI  Noemi Fowler is a 63 y.o. female RTC in acute care after recent visit for sore throat.  Pt got worse after visit and back here for re-eval.   Notes throat pain is severe.  Pasquale like has never had anything this painful.  Since seeing me last week has  Chest congestion, ear pain, and sinus congestion.  No fevers have occurred. Has been checking. Highest numbers have all been normal, not even to 99.0.  No SOA.  Bringing up minimal mucous, \"just this AM\".  Was light yellow color.   Has no body aches. Has only mild HA, more \"my whole head weighs a hundred pounds.  No tooth pain.    Meds:  Ibuprofen daily and then taking TheraFlu at night. Added cough drops for soothing the throat. Gargling with salt water.       Review of Systems   Constitution: Positive for malaise/fatigue. Negative for chills and fever.   HENT: Positive for congestion, ear pain, hoarse voice and sore throat. Negative for ear discharge.    Cardiovascular: Negative for chest pain, dyspnea on exertion and irregular heartbeat.   Respiratory: Positive for cough and sputum production (small, this AM). Negative for shortness of breath.    Musculoskeletal: Negative for myalgias.   Gastrointestinal: Negative for diarrhea, nausea and vomiting.   Neurological: Positive for headaches (minimal).       The following portions of the patient's history were reviewed and updated as appropriate: allergies, current medications, past medical history and problem list.      Current Outpatient Prescriptions:   •  amitriptyline (ELAVIL) 75 MG tablet, TAKE 1 /2 TABLET BY MOUTH AT BEDTIME FOR 30 DAYS, Disp: , Rfl: 6  •  aspirin 81 MG tablet, Take  by mouth Daily., Disp: , Rfl:   •  B Complex-C (SUPER B COMPLEX/VITAMIN C PO), Take  by mouth., Disp: , Rfl:   •  Calcium Carb-Cholecalciferol (CALCIUM + D3) 600-200 MG-UNIT tablet, Take  by mouth., Disp: , Rfl:   •  Cholecalciferol (VITAMIN D3) 2000 units capsule, Take 1 capsule by " "mouth Daily., Disp: 30 capsule, Rfl: 11  •  Coenzyme Q10 (COQ10) 200 MG capsule, Take  by mouth., Disp: , Rfl:   •  ibuprofen (MOTRIN IB) 200 MG tablet, 3 tabs PO q 8 hours, Disp: 30 tablet, Rfl: 0  •  Magnesium 100 MG tablet, Take  by mouth., Disp: , Rfl:   •  Misc Natural Products (GLUCOS-CHONDROIT-MSM COMPLEX) tablet, Take  by mouth., Disp: , Rfl:   •  MULTIPLE VITAMINS ESSENTIAL PO, Take  by mouth., Disp: , Rfl:   •  Omega-3 Fatty Acids (FISH OIL) 1000 MG capsule capsule, Take  by mouth Daily., Disp: , Rfl:   •  pantoprazole (PROTONIX) 40 MG EC tablet, TAKE 1 TABLET BY MOUTH EVERY MORNING, Disp: , Rfl: 11  •  raNITIdine (ZANTAC) 300 MG tablet, Take 1/2 tablet at night, Disp: , Rfl: 11  •  acetaminophen (TYLENOL) 500 MG tablet, 2 tabs PO Q 8 hours PRN, Disp: 30 tablet, Rfl: 0  •  azithromycin (ZITHROMAX Z-JOHN) 250 MG tablet, Take 2 tablets the first day, then 1 tablet daily for 4 days., Disp: 6 tablet, Rfl: 0  •  promethazine-codeine (PHENERGAN with CODEINE) 6.25-10 MG/5ML syrup, Take 5 mL by mouth At Night As Needed for Cough., Disp: 60 mL, Rfl: 0    Vitals:    10/09/18 0838   BP: 122/76   Pulse: 91   Temp: 98.5 °F (36.9 °C)   SpO2: 98%   Weight: 81.2 kg (179 lb)   Height: 170.2 cm (67.01\")         Physical Exam   Constitutional: She is oriented to person, place, and time. She appears well-developed and well-nourished. She does not have a sickly appearance. She appears ill. No distress.   HENT:   Head: Normocephalic and atraumatic.   Right Ear: Tympanic membrane, external ear and ear canal normal.   Left Ear: Tympanic membrane, external ear and ear canal normal.   Nose: No mucosal edema or rhinorrhea. Right sinus exhibits no maxillary sinus tenderness and no frontal sinus tenderness. Left sinus exhibits no maxillary sinus tenderness and no frontal sinus tenderness.   Mouth/Throat: Uvula is midline. Mucous membranes are not pale, not dry and not cyanotic. No oral lesions. Posterior oropharyngeal erythema (mild, " diffuse in posterior pharynx) present. No oropharyngeal exudate or posterior oropharyngeal edema.   Eyes: Pupils are equal, round, and reactive to light. Conjunctivae are normal. Right eye exhibits no discharge. Left eye exhibits no discharge.   Neck: Normal range of motion. Neck supple.   Cardiovascular: Normal rate, regular rhythm and normal heart sounds.    Pulmonary/Chest: Effort normal and breath sounds normal. No respiratory distress. She has no wheezes. She has no rales.   Lymphadenopathy:     She has no cervical adenopathy.   Neurological: She is alert and oriented to person, place, and time. No cranial nerve deficit.   Skin: No rash noted.   Psychiatric: She has a normal mood and affect. Her behavior is normal.   Vitals reviewed.      Results for orders placed or performed in visit on 10/09/18   POC CBC With / Auto Diff   Result Value Ref Range    WBC 5.5     RBC 5.23     Hemoglobin 14.9 g/dL    Hematocrit 45.3 %    MCV 86.5     MCH 28.4     MCHC 32.9     RDW-CV 13.9     MPV 6.5     Platelets 152 10*3/mm3    Neutrophil Rel % 54.9 %    Monocyte Rel % 5.8 %    Lymphocyte Rel % 39.3 %   POC Infectious Mononucleosis Antibody   Result Value Ref Range    Monospot Negative Negative    Internal Control Passed Passed    Lot Number [id number]     Expiration Date 12/31/2018    POC Influenza A / B   Result Value Ref Range    Rapid Influenza A Ag negative     Rapid Influenza B Ag negative     Internal Control Passed Passed    Lot Number [id number]     Expiration Date 12/31/2019    POC Rapid Strep A   Result Value Ref Range    Rapid Strep A Screen Negative Negative, VALID, INVALID, Not Performed    Internal Control Passed Passed    Lot Number [id number]     Expiration Date 3/31/2019        Assessment/ Plan  Diagnoses and all orders for this visit:    Acute pharyngitis, unspecified etiology  -     POC CBC With / Auto Diff  -     POC Infectious Mononucleosis Antibody  -     POC Influenza A / B  -     POC Rapid Strep A  -      Cancel: Respiratory Culture - Sputum, Throat  -     Throat / Upper Respiratory Culture - Swab, Throat  -     promethazine-codeine (PHENERGAN with CODEINE) 6.25-10 MG/5ML syrup; Take 5 mL by mouth At Night As Needed for Cough.  -     acetaminophen (TYLENOL) 500 MG tablet; 2 tabs PO Q 8 hours PRN    Acute sinusitis, recurrence not specified, unspecified location  -     POC CBC With / Auto Diff  -     POC Infectious Mononucleosis Antibody  -     Throat / Upper Respiratory Culture - Swab, Throat    Acute bronchitis, unspecified organism  -     POC CBC With / Auto Diff  -     POC Infectious Mononucleosis Antibody  -     Throat / Upper Respiratory Culture - Swab, Throat    Sore throat  -     acetaminophen (TYLENOL) 500 MG tablet; 2 tabs PO Q 8 hours PRN    Other orders  -     Discontinue: azithromycin (ZITHROMAX Z-JOHN) 250 MG tablet; Take 2 tablets the first day, then 1 tablet daily for 4 days.  -     azithromycin (ZITHROMAX Z-JOHN) 250 MG tablet; Take 2 tablets the first day, then 1 tablet daily for 4 days.        Return for Next scheduled follow up.      Discussion:  Noemi Fowler is a 63 y.o. female RTC in short interval f/u with persistent throat pain after recent dx of early viral URI.  Pt has, as expected, gotten worse with new nasal and bronchial sx.  Exam today is still benign.  Lab data, including CBC, is unrevealing for alternate cause of sx. I again reassured pt and do expect she will improve as we move through this week (Recall pt first visit was ~24-36 hours into sx).  WIll have pt alternate NSAID and Tylenol for pain control. OK for phenergan/ codeine syrup at night for sleep and cough control.  Gave Rx for Abx, but asked to hold for right now and not start unless sx are not improving by end of the week.  Will f/u on throat cx for any non-strp bacteria as etiology of sx, but low suspicion given associated cough/ nasal sx.

## 2018-10-12 LAB
BACTERIA SPEC RESP CULT: NORMAL
BACTERIA SPEC RESP CULT: NORMAL

## 2018-12-18 DIAGNOSIS — M17.0 PRIMARY OSTEOARTHRITIS OF BOTH KNEES: ICD-10-CM

## 2018-12-18 DIAGNOSIS — N39.41 URGE INCONTINENCE OF URINE: ICD-10-CM

## 2018-12-18 DIAGNOSIS — M19.041 PRIMARY OSTEOARTHRITIS OF BOTH HANDS: ICD-10-CM

## 2018-12-18 DIAGNOSIS — E78.5 DYSLIPIDEMIA: Primary | ICD-10-CM

## 2018-12-18 DIAGNOSIS — Z00.00 HEALTHCARE MAINTENANCE: ICD-10-CM

## 2018-12-18 DIAGNOSIS — M19.042 PRIMARY OSTEOARTHRITIS OF BOTH HANDS: ICD-10-CM

## 2018-12-20 LAB
25(OH)D3+25(OH)D2 SERPL-MCNC: 47.1 NG/ML (ref 30–100)
ALBUMIN SERPL-MCNC: 4.3 G/DL (ref 3.5–5.2)
ALBUMIN/GLOB SERPL: 1.7 G/DL
ALP SERPL-CCNC: 82 U/L (ref 39–117)
ALT SERPL-CCNC: 18 U/L (ref 1–33)
APPEARANCE UR: CLEAR
AST SERPL-CCNC: 22 U/L (ref 1–32)
BACTERIA #/AREA URNS HPF: NORMAL /HPF
BASOPHILS # BLD AUTO: 0.01 10*3/MM3 (ref 0–0.2)
BASOPHILS NFR BLD AUTO: 0.2 % (ref 0–1.5)
BILIRUB SERPL-MCNC: 0.5 MG/DL (ref 0.1–1.2)
BILIRUB UR QL STRIP: NEGATIVE
BUN SERPL-MCNC: 13 MG/DL (ref 8–23)
BUN/CREAT SERPL: 17.1 (ref 7–25)
CALCIUM SERPL-MCNC: 9.6 MG/DL (ref 8.6–10.5)
CHLORIDE SERPL-SCNC: 105 MMOL/L (ref 98–107)
CHOLEST SERPL-MCNC: 194 MG/DL (ref 0–200)
CO2 SERPL-SCNC: 28.7 MMOL/L (ref 22–29)
COLOR UR: YELLOW
CREAT SERPL-MCNC: 0.76 MG/DL (ref 0.57–1)
EOSINOPHIL # BLD AUTO: 0.04 10*3/MM3 (ref 0–0.7)
EOSINOPHIL NFR BLD AUTO: 0.8 % (ref 0.3–6.2)
EPI CELLS #/AREA URNS HPF: NORMAL /HPF
ERYTHROCYTE [DISTWIDTH] IN BLOOD BY AUTOMATED COUNT: 13.9 % (ref 11.7–13)
GLOBULIN SER CALC-MCNC: 2.5 GM/DL
GLUCOSE SERPL-MCNC: 97 MG/DL (ref 65–99)
GLUCOSE UR QL: NEGATIVE
HCT VFR BLD AUTO: 38.4 % (ref 35.6–45.5)
HDLC SERPL-MCNC: 43 MG/DL (ref 40–60)
HGB BLD-MCNC: 13.2 G/DL (ref 11.9–15.5)
HGB UR QL STRIP: NEGATIVE
IMM GRANULOCYTES # BLD: 0 10*3/MM3 (ref 0–0.03)
IMM GRANULOCYTES NFR BLD: 0 % (ref 0–0.5)
KETONES UR QL STRIP: NEGATIVE
LDLC SERPL CALC-MCNC: 127 MG/DL (ref 0–100)
LEUKOCYTE ESTERASE UR QL STRIP: NEGATIVE
LYMPHOCYTES # BLD AUTO: 2.07 10*3/MM3 (ref 0.9–4.8)
LYMPHOCYTES NFR BLD AUTO: 41.8 % (ref 19.6–45.3)
MCH RBC QN AUTO: 30.7 PG (ref 26.9–32)
MCHC RBC AUTO-ENTMCNC: 34.4 G/DL (ref 32.4–36.3)
MCV RBC AUTO: 89.3 FL (ref 80.5–98.2)
MICRO URNS: NORMAL
MICRO URNS: NORMAL
MONOCYTES # BLD AUTO: 0.37 10*3/MM3 (ref 0.2–1.2)
MONOCYTES NFR BLD AUTO: 7.5 % (ref 5–12)
MUCOUS THREADS URNS QL MICRO: PRESENT /HPF
NEUTROPHILS # BLD AUTO: 2.46 10*3/MM3 (ref 1.9–8.1)
NEUTROPHILS NFR BLD AUTO: 49.7 % (ref 42.7–76)
NITRITE UR QL STRIP: NEGATIVE
PH UR STRIP: 6 [PH] (ref 5–7.5)
PLATELET # BLD AUTO: 176 10*3/MM3 (ref 140–500)
POTASSIUM SERPL-SCNC: 4.3 MMOL/L (ref 3.5–5.2)
PROT SERPL-MCNC: 6.8 G/DL (ref 6–8.5)
PROT UR QL STRIP: NEGATIVE
RBC # BLD AUTO: 4.3 10*6/MM3 (ref 3.9–5.2)
RBC #/AREA URNS HPF: NORMAL /HPF
SODIUM SERPL-SCNC: 142 MMOL/L (ref 136–145)
SP GR UR: 1.01 (ref 1–1.03)
TRIGL SERPL-MCNC: 122 MG/DL (ref 0–150)
TSH SERPL DL<=0.005 MIU/L-ACNC: 2.85 MIU/ML (ref 0.27–4.2)
URINALYSIS REFLEX: NORMAL
UROBILINOGEN UR STRIP-MCNC: 0.2 MG/DL (ref 0.2–1)
VLDLC SERPL CALC-MCNC: 24.4 MG/DL (ref 5–40)
WBC # BLD AUTO: 4.95 10*3/MM3 (ref 4.5–10.7)
WBC #/AREA URNS HPF: NORMAL /HPF

## 2018-12-26 ENCOUNTER — OFFICE VISIT (OUTPATIENT)
Dept: INTERNAL MEDICINE | Facility: CLINIC | Age: 63
End: 2018-12-26

## 2018-12-26 VITALS
SYSTOLIC BLOOD PRESSURE: 124 MMHG | WEIGHT: 180 LBS | DIASTOLIC BLOOD PRESSURE: 82 MMHG | TEMPERATURE: 98 F | BODY MASS INDEX: 28.25 KG/M2 | OXYGEN SATURATION: 98 % | HEART RATE: 69 BPM | HEIGHT: 67 IN

## 2018-12-26 DIAGNOSIS — K22.4 ESOPHAGEAL DYSMOTILITY: ICD-10-CM

## 2018-12-26 DIAGNOSIS — M17.0 PRIMARY OSTEOARTHRITIS OF BOTH KNEES: ICD-10-CM

## 2018-12-26 DIAGNOSIS — N39.41 URGE INCONTINENCE OF URINE: ICD-10-CM

## 2018-12-26 DIAGNOSIS — G25.0 ESSENTIAL TREMOR: ICD-10-CM

## 2018-12-26 DIAGNOSIS — Z86.69 HISTORY OF PERIPHERAL NEUROPATHY: ICD-10-CM

## 2018-12-26 DIAGNOSIS — M50.90 DISC DISORDER OF CERVICAL REGION: ICD-10-CM

## 2018-12-26 DIAGNOSIS — K22.2 SCHATZKI'S RING: ICD-10-CM

## 2018-12-26 DIAGNOSIS — K21.9 GASTROESOPHAGEAL REFLUX DISEASE WITHOUT ESOPHAGITIS: ICD-10-CM

## 2018-12-26 DIAGNOSIS — M19.042 PRIMARY OSTEOARTHRITIS OF BOTH HANDS: ICD-10-CM

## 2018-12-26 DIAGNOSIS — M19.041 PRIMARY OSTEOARTHRITIS OF BOTH HANDS: ICD-10-CM

## 2018-12-26 DIAGNOSIS — E78.5 DYSLIPIDEMIA: ICD-10-CM

## 2018-12-26 DIAGNOSIS — Z00.00 HEALTHCARE MAINTENANCE: Primary | ICD-10-CM

## 2018-12-26 PROCEDURE — 99396 PREV VISIT EST AGE 40-64: CPT | Performed by: INTERNAL MEDICINE

## 2018-12-26 NOTE — PROGRESS NOTES
"Annual Exam      HPI  Noemi Fowler is a 63 y.o. female RTC in yearly CPE, review of medical issues:  Has been doing well.  Expecting new grandchild in a few days and very excited. Health has been good.   1. Dyslipidemia -  Numbers had improved last year after statins deferred with low CR in 2016.   Feels like doing \"better\" on diet, but exercise still needs to improve further.   2. Epigastric vague pain after > 1 year of abdominal bloating not relieved by lap cristiano and appendectomy for mucocoele appendix, not clarified by normal C-scope in 12/2015. New dx of Schatzki ring with esophageal spasm on 2017 EGD and suspected GERD element  - saw Dr. Arredondo in past year and did PPI Rx. Pt notes in 10/2018 \"quit taking everything\".  Feels like \"I dont feel as bad anymore\" but admits that is watching diet and avoiding late night/ spicy eating.  No issues with swallowing, \"that is all gone too\". No return of epigastric pain.   3. Essential Tremor - dx'd in 2015, after (-) DAMIAN scan. Really no change.  Recalls off gabapentin > 1 year.  Sx really are more noticeable L >> R when \"wants to hold something\".   4. Neuropathy - tingling on whole L side of body and has had extensive w/u with Neurology, unclear etiology. \"Still here\" No change, \"no, still there\".   5. Cervical spine DJD/ DDD s/p fusion at C5-C6 and s/p epidurals in past - CHAR. \"No\".  \"Today is a good day\".    6. OA, in hands and knees - pain > stiffness have been progressive in last few months in B knees, R > L.  Knees are painful even at rest, variable through day.  Activity does make worse. Knees swell \"somewhat\", do not turn red. No TTP.   7. Urge Incontinence - \"It is still there\", getting worse at times.  \"I can deal with it\".  Recalls unable to complete PT due to time issue at time of course.  Declines meds. Still does not feel like could comply with that regimen.      Review of Systems   Constitution: Negative for chills, fever, malaise/fatigue, weight gain " and weight loss.   HENT: Negative for congestion, odynophagia and sore throat.    Eyes: Negative for discharge, double vision, pain and redness.        Last eye exam 12/2018; no glasses   Cardiovascular: Negative for chest pain, dyspnea on exertion, irregular heartbeat, near-syncope, palpitations and syncope.   Respiratory: Negative for cough and shortness of breath.    Endocrine: Negative for polydipsia, polyphagia and polyuria.   Hematologic/Lymphatic: Negative for bleeding problem. Does not bruise/bleed easily.   Skin: Negative for rash, skin cancer and suspicious lesions.        Sees derm once yearly     Musculoskeletal: Positive for arthritis and joint pain (B knees and shoulders). Negative for joint swelling, muscle cramps, muscle weakness and myalgias.   Gastrointestinal: Negative for constipation, diarrhea, dysphagia, heartburn, nausea and vomiting.   Genitourinary: Positive for bladder incontinence and urgency. Negative for dysuria, frequency, hematuria and hesitancy.        Wears pad daily     Neurological: Negative for dizziness, headaches and light-headedness.   Psychiatric/Behavioral: Negative for depression. The patient does not have insomnia and is not nervous/anxious.    Allergic/Immunologic: Negative for environmental allergies and persistent infections.       Problem List:    Patient Active Problem List   Diagnosis   • Osteoarthritis of hand   • Urge incontinence of urine   • Carotid atherosclerosis   • Disc disorder of cervical region   • Osteoarthritis of knee   • Essential tremor   • History of peripheral neuropathy   • Dyslipidemia   • Gastroesophageal reflux disease without esophagitis   • Schatzki's ring   • Nocturnal muscle cramps   • Esophageal dysmotility       Medical History:    Past Medical History:   Diagnosis Date   • Abnormal LFTs    • Abnormal liver function tests 10/11/2016   • Carotid artery plaque     lifeline screen only not noted on formal screen at [facility] rads 2014   • Cervical  disc disorder, unspecified, unspecified cervical region     djd/ddd c5-c6 fusion epiderals   • Essential tremor 10/17/2016   • Gastroesophageal reflux disease without esophagitis 4/21/2017    With noted Schatzki ring and esophageal spasm in 2017 EGD   • History of peripheral neuropathy     sees Dr. Jose Rafael drake only on L side   • Liver hemangioma     on CT scan x 2 in 2015   • Localized osteoarthritis of hand    • Mucocele, appendix    • Osteoarthritis, localized, knee    • Osteopenia     noted on lifeline screen not seen on formal hip/L-spine DEXA in 11/2014   • Pneumonia of right lower lobe due to infectious organism (CMS/HCC) 3/20/2017    3/2017, resolved.    • PONV (postoperative nausea and vomiting)    • Post herpetic neuralgia    • Postherpetic neuralgia 10/17/2016   • Urge incontinence of urine         Social History:    Social History     Socioeconomic History   • Marital status:      Spouse name: Not on file   • Number of children: 2   • Years of education: Not on file   • Highest education level: Not on file   Social Needs   • Financial resource strain: Not on file   • Food insecurity - worry: Not on file   • Food insecurity - inability: Not on file   • Transportation needs - medical: Not on file   • Transportation needs - non-medical: Not on file   Occupational History   • Occupation: Retired   Tobacco Use   • Smoking status: Former Smoker   • Smokeless tobacco: Never Used   • Tobacco comment: 5 pack/year history; quit in 20's.    Substance and Sexual Activity   • Alcohol use: Yes     Comment: 5 beers/ week (1-2 day/ time)   • Drug use: No   • Sexual activity: Yes     Partners: Male     Comment:  only; no hx STD's   Other Topics Concern   • Not on file   Social History Narrative    Diet - variable, some fruits >> veggies    Exercise - None; active outside on property    Caffeine - 1-2 cups coffee/ day       Family History:   Family History   Problem Relation Age of Onset   • Hypertension  Mother    • Hypothyroidism Mother    • Dementia Mother    • Leukemia Father         chronic lymphocytic   • Hypertension Father    • Breast cancer Sister    • Hypertension Sister    • Stroke Sister    • Depression Brother    • Hypertension Brother    • Colon cancer Maternal Grandmother    • Cancer Maternal Grandmother    • No Known Problems Son        Surgical History:   Past Surgical History:   Procedure Laterality Date   • APPENDECTOMY      11/11/15 due to mucocoele   • CERVICAL FUSION     • ENDOSCOPY N/A 1/27/2017    Procedure: ESOPHAGOGASTRODUODENOSCOPY WITH DILATATION GONZALEZ 52 FR,WITH BIOPSY;  Surgeon: Kofi Arredondo MD;  Location: [facility] ENDOSCOPY;  Service:    • EYE SURGERY      cataract surgery B 2012   • GALLBLADDER SURGERY      02/2016   • HYSTERECTOMY      pt has ovaries MARION only   • LASIK      bilateral   • OSTECTOMY      navicular   • SKIN BIOPSY     • SOFT TISSUE CYST EXCISION           Current Outpatient Medications:   •  aspirin 81 MG tablet, Take  by mouth Daily., Disp: , Rfl:   •  B Complex-C (SUPER B COMPLEX/VITAMIN C PO), Take  by mouth., Disp: , Rfl:   •  Calcium Carb-Cholecalciferol (CALCIUM + D3) 600-200 MG-UNIT tablet, Take  by mouth., Disp: , Rfl:   •  Coenzyme Q10 (COQ10) 200 MG capsule, Take  by mouth., Disp: , Rfl:   •  ibuprofen (MOTRIN IB) 200 MG tablet, 3 tabs PO q 8 hours, Disp: 30 tablet, Rfl: 0  •  Magnesium 100 MG tablet, Take  by mouth., Disp: , Rfl:   •  Misc Natural Products (GLUCOS-CHONDROIT-MSM COMPLEX) tablet, Take  by mouth., Disp: , Rfl:   •  MULTIPLE VITAMINS ESSENTIAL PO, Take  by mouth., Disp: , Rfl:   •  Omega-3 Fatty Acids (FISH OIL) 1000 MG capsule capsule, Take  by mouth Daily., Disp: , Rfl:     Vitals:    12/26/18 0858   BP: 124/82   Pulse: 69   Temp: 98 °F (36.7 °C)   SpO2: 98%       Physical Exam   Constitutional: She is oriented to person, place, and time. She appears well-developed and well-nourished.   HENT:   Head: Normocephalic and atraumatic.   Right Ear:  Hearing, tympanic membrane, external ear and ear canal normal.   Left Ear: Hearing, tympanic membrane, external ear and ear canal normal.   Nose: Nose normal.   Mouth/Throat: Uvula is midline, oropharynx is clear and moist and mucous membranes are normal.   Eyes: Conjunctivae, EOM and lids are normal. Pupils are equal, round, and reactive to light. Right eye exhibits no discharge. Left eye exhibits no discharge. No scleral icterus.   Neck: Trachea normal, normal range of motion and full passive range of motion without pain. Neck supple. Carotid bruit is not present. No thyroid mass and no thyromegaly present.   Cardiovascular: Normal rate, regular rhythm, S1 normal, S2 normal, normal heart sounds and intact distal pulses. Exam reveals no gallop and no friction rub.   No murmur heard.  Pulses:       Radial pulses are 2+ on the right side, and 2+ on the left side.        Dorsalis pedis pulses are 2+ on the right side, and 2+ on the left side.        Posterior tibial pulses are 2+ on the right side, and 2+ on the left side.   Pulmonary/Chest: Effort normal and breath sounds normal. No respiratory distress. She has no wheezes. She has no rhonchi. She has no rales.   Abdominal: Soft. Normal appearance and bowel sounds are normal. She exhibits no distension and no mass. There is no hepatosplenomegaly. There is no tenderness. There is no rebound and no guarding.   Musculoskeletal: She exhibits no edema.        Left hand: She exhibits deformity (diffuse DIP joint hypertrophy). She exhibits no tenderness and no bony tenderness.        Hands:    Vascular Status -  Her right foot exhibits normal foot vasculature  and no edema. Her left foot exhibits normal foot vasculature  and no edema.  Lymphadenopathy:     She has no cervical adenopathy.     She has no axillary adenopathy.        Right: No inguinal adenopathy present.        Left: No inguinal adenopathy present.   Neurological: She is alert and oriented to person, place,  and time. She has normal strength and normal reflexes. She displays no tremor. No cranial nerve deficit or sensory deficit. She exhibits normal muscle tone. Gait normal.   Skin: Skin is warm and dry. No rash noted.        Psychiatric: She has a normal mood and affect. Her behavior is normal. Thought content normal. Cognition and memory are normal.   Vitals reviewed.      Assessment/ Plan  Diagnoses and all orders for this visit:    Healthcare maintenance    Disc disorder of cervical region    Dyslipidemia    Esophageal dysmotility    Essential tremor    Gastroesophageal reflux disease without esophagitis    History of peripheral neuropathy    Primary osteoarthritis of both knees    Primary osteoarthritis of both hands    Schatzki's ring    Urge incontinence of urine        Return in about 1 year (around 12/26/2019) for Annual physical.      Discussion:  Noemi Fowler is a 63 y.o. female RTC in yearly CPE, review of medical issues:   1. Dyslipidemia - numbers had improved last couple of years, after statins deferred with low CR in 2016.  C/W improved diet and advised to add CV exercise.    2. Epigastric vague pain after > 1 year of abdominal bloating not relieved by lap cristiano and appendectomy for mucocoele appendix, not clarified by normal C-scope in 12/2015. New dx of Schatzki ring with esophageal spasm on 2017 EGD and suspected GERD element  -s/p PPI course with Dr. Arredondo in past year, sx remain controlled after pt self D/C'd meds in 10/2018.  C/W diet mods for sx control.    3. Essential Tremor - dx'd in 2015, after (-) DAMIAN scan, no progression. Off gabapentin > 1 year. Monitor.   4. Neuropathy - tingling on whole L side of body and has had extensive w/u with Neurology, unclear etiology. Remains off Gabapentin with no change in sx.   5. Cervical spine DJD/ DDD s/p fusion at C5-C6 and s/p epidurals in past - CHAR.   6. OA, in hands and knees - pain > stiffness have been progressive in last few months in B knees,  R > L.  Plans ortho eval.  OK c/w glucosamine.  Off NSAIDs.   7. Urge Incontinence - Pt to readdress with PT when has more time, unable to comply with regimen in past and at this time.  U/A clear today.   8. HM - labs d/w pt; Flu/ Tdap/ Hep A #1/ Zostavax - UTD; Hep A #2 at pharmacy; C-scope 12/2015 (-) --> f/u 5 years; Pap/ Mammo OK ~8/2018 with Gyn, f/u yearly; DEXA normal 7/2016 per Gyn --> repeat at 64; Hep C Ab (-) 2014; AAA (-) on 2015 CT A/P; add back exercise    RTC one year CPE ([facility]), F labs and DEXA prior

## 2018-12-26 NOTE — PATIENT INSTRUCTIONS
Shingrix (new shingles shot; 2 shot series) check at pharmacy  Hepatitis A (2 shot series), complete series as of 1/2019

## 2019-10-04 ENCOUNTER — TRANSCRIBE ORDERS (OUTPATIENT)
Dept: ADMINISTRATIVE | Facility: HOSPITAL | Age: 64
End: 2019-10-04

## 2019-10-04 DIAGNOSIS — Z13.6 ENCOUNTER FOR SCREENING FOR VASCULAR DISEASE: Primary | ICD-10-CM

## 2019-10-17 ENCOUNTER — HOSPITAL ENCOUNTER (OUTPATIENT)
Dept: CARDIOLOGY | Facility: HOSPITAL | Age: 64
Discharge: HOME | End: 2019-10-17
Admitting: INTERNAL MEDICINE

## 2019-10-17 VITALS
BODY MASS INDEX: 28.25 KG/M2 | DIASTOLIC BLOOD PRESSURE: 70 MMHG | HEIGHT: 67 IN | HEART RATE: 60 BPM | SYSTOLIC BLOOD PRESSURE: 128 MMHG | WEIGHT: 180 LBS

## 2019-10-17 DIAGNOSIS — Z13.6 ENCOUNTER FOR SCREENING FOR VASCULAR DISEASE: ICD-10-CM

## 2019-10-17 LAB
BH CV ECHO MEAS - DIST AO DIAM: 1.4 CM
BH CV VAS BP LEFT ARM: NORMAL MMHG
BH CV VAS BP RIGHT ARM: NORMAL MMHG
BH CV XLRA MEAS - MID AO DIAM: 1.57 CM
BH CV XLRA MEAS - PROX AO DIAM: 1.79 CM

## 2019-10-17 PROCEDURE — 93799 UNLISTED CV SVC/PROCEDURE: CPT

## 2019-10-23 ENCOUNTER — APPOINTMENT (OUTPATIENT)
Dept: WOMENS IMAGING | Facility: HOSPITAL | Age: 64
End: 2019-10-23

## 2019-10-23 ENCOUNTER — OFFICE VISIT (OUTPATIENT)
Dept: INTERNAL MEDICINE | Facility: CLINIC | Age: 64
End: 2019-10-23

## 2019-10-23 VITALS
BODY MASS INDEX: 28.25 KG/M2 | SYSTOLIC BLOOD PRESSURE: 114 MMHG | TEMPERATURE: 98.5 F | HEART RATE: 62 BPM | DIASTOLIC BLOOD PRESSURE: 72 MMHG | WEIGHT: 180 LBS | HEIGHT: 67 IN | OXYGEN SATURATION: 98 %

## 2019-10-23 DIAGNOSIS — K22.2 SCHATZKI'S RING: ICD-10-CM

## 2019-10-23 DIAGNOSIS — K22.4 ESOPHAGEAL DYSMOTILITY: ICD-10-CM

## 2019-10-23 DIAGNOSIS — K21.9 GASTROESOPHAGEAL REFLUX DISEASE WITHOUT ESOPHAGITIS: Primary | ICD-10-CM

## 2019-10-23 PROCEDURE — 99214 OFFICE O/P EST MOD 30 MIN: CPT | Performed by: INTERNAL MEDICINE

## 2019-10-23 PROCEDURE — 77063 BREAST TOMOSYNTHESIS BI: CPT | Performed by: RADIOLOGY

## 2019-10-23 PROCEDURE — 77067 SCR MAMMO BI INCL CAD: CPT | Performed by: RADIOLOGY

## 2019-10-23 RX ORDER — OMEPRAZOLE 40 MG/1
40 CAPSULE, DELAYED RELEASE ORAL DAILY
Qty: 30 CAPSULE | Refills: 3 | Status: SHIPPED | OUTPATIENT
Start: 2019-10-23 | End: 2020-02-11

## 2019-10-23 NOTE — PROGRESS NOTES
"Abdominal Pain (Discomfort )      HPI  Noemi Fowler is a 64 y.o. female RTC in acute care:  Notes that abdominal pains are \"back in full force\".  Notes that when here ~10 months ago pain was gone and that lasted several months.  Recurred for last several months.  Sx are variable but notices it is there every day.  Will have \"real sharp pains once a week or once every two weeks\".  The daily pain is \"I notice that it is there but it is not painful\".  \"always in the same spot\".  R to middle upper abdomen.  No pain to push over area. No relation to food.    Recalls came off PPI in past as sx were better.  Has avoided trigger foods so less reflux and water brash than in past. Will have some mild regurgitation feeling, \"a sensation near my throat area\" (but no overt heartburn) once every 2 weeks. No vomiting.  No nausea.   Meds: nothing. Never went back on PPI.   Bowels are working fine.  No blood in stool. No black stool. Magnesium daily has \"kept me regular\".     Review of Systems   Constitution: Negative for chills, fever and weight loss.   HENT: Negative for odynophagia and sore throat.    Cardiovascular: Negative for chest pain, dyspnea on exertion and irregular heartbeat.   Respiratory: Negative for shortness of breath.    Musculoskeletal: Negative for myalgias.   Gastrointestinal: Positive for abdominal pain. Negative for anorexia, constipation, diarrhea, dysphagia, flatus, hematochezia, melena, nausea and vomiting.   Genitourinary: Negative for dysuria, frequency and hematuria.   Neurological: Negative for headaches.       The following portions of the patient's history were reviewed and updated as appropriate: allergies, current medications, past medical history, past social history and problem list.      Current Outpatient Medications:   •  aspirin 81 MG tablet, Take  by mouth Daily., Disp: , Rfl:   •  B Complex-C (SUPER B COMPLEX/VITAMIN C PO), Take  by mouth., Disp: , Rfl:   •  Calcium " "Carb-Cholecalciferol (CALCIUM + D3) 600-200 MG-UNIT tablet, Take  by mouth., Disp: , Rfl:   •  Coenzyme Q10 (COQ10) 200 MG capsule, Take  by mouth., Disp: , Rfl:   •  Magnesium 100 MG tablet, Take  by mouth., Disp: , Rfl:   •  Misc Natural Products (GLUCOS-CHONDROIT-MSM COMPLEX) tablet, Take  by mouth., Disp: , Rfl:   •  MULTIPLE VITAMINS ESSENTIAL PO, Take  by mouth., Disp: , Rfl:   •  Omega-3 Fatty Acids (FISH OIL) 1000 MG capsule capsule, Take  by mouth Daily., Disp: , Rfl:   •  omeprazole (priLOSEC) 40 MG capsule, Take 1 capsule by mouth Daily., Disp: 30 capsule, Rfl: 3    Vitals:    10/23/19 1100   BP: 114/72   Pulse: 62   Temp: 98.5 °F (36.9 °C)   SpO2: 98%   Weight: 81.6 kg (180 lb)   Height: 170.2 cm (67.01\")         Physical Exam   Constitutional: She is oriented to person, place, and time. She appears well-developed and well-nourished. No distress.   HENT:   Head: Normocephalic and atraumatic.   Mouth/Throat: Oropharynx is clear and moist. No oropharyngeal exudate.   Eyes: Conjunctivae are normal. Pupils are equal, round, and reactive to light. Right eye exhibits no discharge. Left eye exhibits no discharge. No scleral icterus.   Neck: Normal range of motion. Neck supple.   Cardiovascular: Normal rate, regular rhythm and normal heart sounds.   Pulmonary/Chest: Effort normal and breath sounds normal. No respiratory distress. She has no wheezes. She has no rales.   Abdominal: Soft. Bowel sounds are normal. She exhibits no distension and no mass. There is tenderness (mild, just to R of midline) in the epigastric area. There is no rigidity, no rebound, no guarding and negative Villaseñor's sign.   Lymphadenopathy:     She has no cervical adenopathy.   Neurological: She is alert and oriented to person, place, and time. No cranial nerve deficit. She exhibits normal muscle tone. Gait normal.   Psychiatric: She has a normal mood and affect. Her behavior is normal.   Vitals reviewed.      Assessment/ Plan  Diagnoses " and all orders for this visit:    Gastroesophageal reflux disease without esophagitis  -     omeprazole (priLOSEC) 40 MG capsule; Take 1 capsule by mouth Daily.  -     CBC & Differential  -     Comprehensive Metabolic Panel  -     Lipase    Schatzki's ring  -     omeprazole (priLOSEC) 40 MG capsule; Take 1 capsule by mouth Daily.  -     CBC & Differential  -     Comprehensive Metabolic Panel  -     Lipase    Esophageal dysmotility  -     omeprazole (priLOSEC) 40 MG capsule; Take 1 capsule by mouth Daily.  -     CBC & Differential  -     Comprehensive Metabolic Panel  -     Lipase        No Follow-up on file.      Discussion:  Noemi Fowler is a 64 y.o. Female with hx of epigastric pain in past s/p lap cristiano and appy and subsequent mild gastritis and Schatzki's ring/ spastic esophagus on EGD in 2017 RTC in acute care with return of same abdominal pain sx. Pt notes she has been off PPI for long term after EGD as sx resolved and has not restarted.  Exam today is notable for mild epigastric pain wtihout rebound, benign otherwise.  I reviwed past path with mild chronic gastritis on stomach bx.  Ddx here includes gastritis vs. PUD vs. FD.  As FDis dx of exlusion and my require repeat CT (alst 2015) or EGD, will have pt start on pPI daily Rx for one month and will f/u on sx. If recure or progressive, will need to see GI and consider additional w/u as r/o for FD (recall pt has IBS managed on magnesium daily).  Will call pt in one month on sx f/u; knows to call sooner if sx progressive.     Answers for HPI/ROS submitted by the patient on 10/23/2019   Abdominal pain  Chronicity: recurrent  Onset: more than 1 month ago  Onset quality: gradual  Frequency: daily  Progression since onset: waxing and waning  Pain location: RLQ  Pain - numeric: 4/10  Pain quality: sharp  Radiates to: RLQ  arthralgias: No  belching: No  Aggravated by: nothing  Relieved by: nothing

## 2019-10-24 LAB
ALBUMIN SERPL-MCNC: 4.9 G/DL (ref 3.5–5.2)
ALBUMIN/GLOB SERPL: 2 G/DL
ALP SERPL-CCNC: 97 U/L (ref 39–117)
ALT SERPL-CCNC: 28 U/L (ref 1–33)
AST SERPL-CCNC: 24 U/L (ref 1–32)
BASOPHILS # BLD AUTO: 0.04 10*3/MM3 (ref 0–0.2)
BASOPHILS NFR BLD AUTO: 0.5 % (ref 0–1.5)
BILIRUB SERPL-MCNC: 0.5 MG/DL (ref 0.2–1.2)
BUN SERPL-MCNC: 15 MG/DL (ref 8–23)
BUN/CREAT SERPL: 17.6 (ref 7–25)
CALCIUM SERPL-MCNC: 9.5 MG/DL (ref 8.6–10.5)
CHLORIDE SERPL-SCNC: 102 MMOL/L (ref 98–107)
CO2 SERPL-SCNC: 28.9 MMOL/L (ref 22–29)
CREAT SERPL-MCNC: 0.85 MG/DL (ref 0.57–1)
EOSINOPHIL # BLD AUTO: 0.05 10*3/MM3 (ref 0–0.4)
EOSINOPHIL NFR BLD AUTO: 0.6 % (ref 0.3–6.2)
ERYTHROCYTE [DISTWIDTH] IN BLOOD BY AUTOMATED COUNT: 12.7 % (ref 12.3–15.4)
GLOBULIN SER CALC-MCNC: 2.4 GM/DL
GLUCOSE SERPL-MCNC: 83 MG/DL (ref 65–99)
HCT VFR BLD AUTO: 44.4 % (ref 34–46.6)
HGB BLD-MCNC: 15 G/DL (ref 12–15.9)
IMM GRANULOCYTES # BLD AUTO: 0.02 10*3/MM3 (ref 0–0.05)
IMM GRANULOCYTES NFR BLD AUTO: 0.3 % (ref 0–0.5)
LIPASE SERPL-CCNC: 36 U/L (ref 13–60)
LYMPHOCYTES # BLD AUTO: 2.71 10*3/MM3 (ref 0.7–3.1)
LYMPHOCYTES NFR BLD AUTO: 35.1 % (ref 19.6–45.3)
MCH RBC QN AUTO: 31.2 PG (ref 26.6–33)
MCHC RBC AUTO-ENTMCNC: 33.8 G/DL (ref 31.5–35.7)
MCV RBC AUTO: 92.3 FL (ref 79–97)
MONOCYTES # BLD AUTO: 0.67 10*3/MM3 (ref 0.1–0.9)
MONOCYTES NFR BLD AUTO: 8.7 % (ref 5–12)
NEUTROPHILS # BLD AUTO: 4.23 10*3/MM3 (ref 1.7–7)
NEUTROPHILS NFR BLD AUTO: 54.8 % (ref 42.7–76)
NRBC BLD AUTO-RTO: 0 /100 WBC (ref 0–0.2)
PLATELET # BLD AUTO: 174 10*3/MM3 (ref 140–450)
POTASSIUM SERPL-SCNC: 4.4 MMOL/L (ref 3.5–5.2)
PROT SERPL-MCNC: 7.3 G/DL (ref 6–8.5)
RBC # BLD AUTO: 4.81 10*6/MM3 (ref 3.77–5.28)
SODIUM SERPL-SCNC: 144 MMOL/L (ref 136–145)
WBC # BLD AUTO: 7.72 10*3/MM3 (ref 3.4–10.8)

## 2019-11-21 ENCOUNTER — TELEPHONE (OUTPATIENT)
Dept: INTERNAL MEDICINE | Facility: CLINIC | Age: 64
End: 2019-11-21

## 2019-11-21 NOTE — TELEPHONE ENCOUNTER
pt still has some pains in the area but she just got over the stomach bug so its hard to tell if it has helped.

## 2019-11-21 NOTE — TELEPHONE ENCOUNTER
----- Message from Levon Barber MD sent at 11/20/2019  8:04 AM EST -----  Call pt. How has her response been to PPI daily since last visit?

## 2019-12-17 DIAGNOSIS — N39.41 URGE INCONTINENCE OF URINE: ICD-10-CM

## 2019-12-17 DIAGNOSIS — Z00.00 HEALTHCARE MAINTENANCE: Primary | ICD-10-CM

## 2019-12-17 DIAGNOSIS — E78.5 DYSLIPIDEMIA: ICD-10-CM

## 2019-12-17 DIAGNOSIS — K22.2 SCHATZKI'S RING: ICD-10-CM

## 2019-12-17 DIAGNOSIS — K21.9 GASTROESOPHAGEAL REFLUX DISEASE WITHOUT ESOPHAGITIS: ICD-10-CM

## 2019-12-21 LAB
ALBUMIN SERPL-MCNC: 4.5 G/DL (ref 3.5–5.2)
ALBUMIN/GLOB SERPL: 1.8 G/DL
ALP SERPL-CCNC: 104 U/L (ref 39–117)
ALT SERPL-CCNC: 69 U/L (ref 1–33)
APPEARANCE UR: CLEAR
AST SERPL-CCNC: 54 U/L (ref 1–32)
BACTERIA #/AREA URNS HPF: NORMAL /HPF
BASOPHILS # BLD AUTO: 0.03 10*3/MM3 (ref 0–0.2)
BASOPHILS NFR BLD AUTO: 0.5 % (ref 0–1.5)
BILIRUB SERPL-MCNC: 0.5 MG/DL (ref 0.2–1.2)
BILIRUB UR QL STRIP: NEGATIVE
BUN SERPL-MCNC: 12 MG/DL (ref 8–23)
BUN/CREAT SERPL: 13.8 (ref 7–25)
CALCIUM SERPL-MCNC: 9.3 MG/DL (ref 8.6–10.5)
CHLORIDE SERPL-SCNC: 104 MMOL/L (ref 98–107)
CHOLEST SERPL-MCNC: 217 MG/DL (ref 0–200)
CO2 SERPL-SCNC: 25.3 MMOL/L (ref 22–29)
COLOR UR: YELLOW
CREAT SERPL-MCNC: 0.87 MG/DL (ref 0.57–1)
EOSINOPHIL # BLD AUTO: 0.07 10*3/MM3 (ref 0–0.4)
EOSINOPHIL NFR BLD AUTO: 1.3 % (ref 0.3–6.2)
EPI CELLS #/AREA URNS HPF: NORMAL /HPF (ref 0–10)
ERYTHROCYTE [DISTWIDTH] IN BLOOD BY AUTOMATED COUNT: 13 % (ref 12.3–15.4)
GLOBULIN SER CALC-MCNC: 2.5 GM/DL
GLUCOSE SERPL-MCNC: 97 MG/DL (ref 65–99)
GLUCOSE UR QL: NEGATIVE
HCT VFR BLD AUTO: 41.6 % (ref 34–46.6)
HDLC SERPL-MCNC: 42 MG/DL (ref 40–60)
HGB BLD-MCNC: 13.9 G/DL (ref 12–15.9)
HGB UR QL STRIP: NEGATIVE
IMM GRANULOCYTES # BLD AUTO: 0.01 10*3/MM3 (ref 0–0.05)
IMM GRANULOCYTES NFR BLD AUTO: 0.2 % (ref 0–0.5)
KETONES UR QL STRIP: NEGATIVE
LDLC SERPL CALC-MCNC: 138 MG/DL (ref 0–100)
LEUKOCYTE ESTERASE UR QL STRIP: NEGATIVE
LYMPHOCYTES # BLD AUTO: 2.01 10*3/MM3 (ref 0.7–3.1)
LYMPHOCYTES NFR BLD AUTO: 36 % (ref 19.6–45.3)
MCH RBC QN AUTO: 30.3 PG (ref 26.6–33)
MCHC RBC AUTO-ENTMCNC: 33.4 G/DL (ref 31.5–35.7)
MCV RBC AUTO: 90.6 FL (ref 79–97)
MICRO URNS: NORMAL
MICRO URNS: NORMAL
MONOCYTES # BLD AUTO: 0.6 10*3/MM3 (ref 0.1–0.9)
MONOCYTES NFR BLD AUTO: 10.7 % (ref 5–12)
NEUTROPHILS # BLD AUTO: 2.87 10*3/MM3 (ref 1.7–7)
NEUTROPHILS NFR BLD AUTO: 51.3 % (ref 42.7–76)
NITRITE UR QL STRIP: NEGATIVE
NRBC BLD AUTO-RTO: 0 /100 WBC (ref 0–0.2)
PH UR STRIP: 6.5 [PH] (ref 5–7.5)
PLATELET # BLD AUTO: 181 10*3/MM3 (ref 140–450)
POTASSIUM SERPL-SCNC: 4.2 MMOL/L (ref 3.5–5.2)
PROT SERPL-MCNC: 7 G/DL (ref 6–8.5)
PROT UR QL STRIP: NEGATIVE
RBC # BLD AUTO: 4.59 10*6/MM3 (ref 3.77–5.28)
RBC #/AREA URNS HPF: NORMAL /HPF (ref 0–2)
SODIUM SERPL-SCNC: 143 MMOL/L (ref 136–145)
SP GR UR: 1.01 (ref 1–1.03)
T4 FREE SERPL-MCNC: 1.08 NG/DL (ref 0.93–1.7)
TRIGL SERPL-MCNC: 186 MG/DL (ref 0–150)
TSH SERPL DL<=0.005 MIU/L-ACNC: 5.39 UIU/ML (ref 0.27–4.2)
URINALYSIS REFLEX: NORMAL
UROBILINOGEN UR STRIP-MCNC: 0.2 MG/DL (ref 0.2–1)
VLDLC SERPL CALC-MCNC: 37.2 MG/DL
WBC # BLD AUTO: 5.59 10*3/MM3 (ref 3.4–10.8)
WBC #/AREA URNS HPF: NORMAL /HPF (ref 0–5)

## 2019-12-27 ENCOUNTER — OFFICE VISIT (OUTPATIENT)
Dept: INTERNAL MEDICINE | Facility: CLINIC | Age: 64
End: 2019-12-27

## 2019-12-27 VITALS
BODY MASS INDEX: 29.03 KG/M2 | SYSTOLIC BLOOD PRESSURE: 110 MMHG | OXYGEN SATURATION: 98 % | RESPIRATION RATE: 12 BRPM | HEIGHT: 67 IN | TEMPERATURE: 98.3 F | WEIGHT: 185 LBS | DIASTOLIC BLOOD PRESSURE: 80 MMHG | HEART RATE: 76 BPM

## 2019-12-27 DIAGNOSIS — K22.4 ESOPHAGEAL DYSMOTILITY: ICD-10-CM

## 2019-12-27 DIAGNOSIS — Z23 ENCOUNTER FOR IMMUNIZATION: ICD-10-CM

## 2019-12-27 DIAGNOSIS — K22.2 SCHATZKI'S RING: ICD-10-CM

## 2019-12-27 DIAGNOSIS — M17.0 PRIMARY OSTEOARTHRITIS OF BOTH KNEES: ICD-10-CM

## 2019-12-27 DIAGNOSIS — K21.9 GASTROESOPHAGEAL REFLUX DISEASE WITHOUT ESOPHAGITIS: ICD-10-CM

## 2019-12-27 DIAGNOSIS — E78.5 DYSLIPIDEMIA: ICD-10-CM

## 2019-12-27 DIAGNOSIS — M70.62 TROCHANTERIC BURSITIS OF BOTH HIPS: ICD-10-CM

## 2019-12-27 DIAGNOSIS — M19.042 PRIMARY OSTEOARTHRITIS OF BOTH HANDS: ICD-10-CM

## 2019-12-27 DIAGNOSIS — R74.8 LIVER ENZYME ELEVATION: ICD-10-CM

## 2019-12-27 DIAGNOSIS — M19.041 PRIMARY OSTEOARTHRITIS OF BOTH HANDS: ICD-10-CM

## 2019-12-27 DIAGNOSIS — Z86.69 HISTORY OF PERIPHERAL NEUROPATHY: ICD-10-CM

## 2019-12-27 DIAGNOSIS — E03.8 SUBCLINICAL HYPOTHYROIDISM: ICD-10-CM

## 2019-12-27 DIAGNOSIS — Z00.00 HEALTHCARE MAINTENANCE: Primary | ICD-10-CM

## 2019-12-27 DIAGNOSIS — M70.61 TROCHANTERIC BURSITIS OF BOTH HIPS: ICD-10-CM

## 2019-12-27 DIAGNOSIS — N39.41 URGE INCONTINENCE OF URINE: ICD-10-CM

## 2019-12-27 DIAGNOSIS — G25.0 ESSENTIAL TREMOR: ICD-10-CM

## 2019-12-27 DIAGNOSIS — M50.90 DISC DISORDER OF CERVICAL REGION: ICD-10-CM

## 2019-12-27 PROCEDURE — 90670 PCV13 VACCINE IM: CPT | Performed by: INTERNAL MEDICINE

## 2019-12-27 PROCEDURE — G0009 ADMIN PNEUMOCOCCAL VACCINE: HCPCS | Performed by: INTERNAL MEDICINE

## 2019-12-27 PROCEDURE — 99212 OFFICE O/P EST SF 10 MIN: CPT | Performed by: INTERNAL MEDICINE

## 2019-12-27 PROCEDURE — 99397 PER PM REEVAL EST PAT 65+ YR: CPT | Performed by: INTERNAL MEDICINE

## 2019-12-27 PROCEDURE — G0438 PPPS, INITIAL VISIT: HCPCS | Performed by: INTERNAL MEDICINE

## 2019-12-27 RX ORDER — FOLIC ACID 0.8 MG
TABLET ORAL DAILY
COMMUNITY
End: 2022-09-13

## 2019-12-27 RX ORDER — CHLORAL HYDRATE 500 MG
2000 CAPSULE ORAL 2 TIMES DAILY WITH MEALS
Start: 2019-12-27 | End: 2022-09-13

## 2019-12-27 NOTE — PROGRESS NOTES
"Subjective       Chief Complaint   Patient presents with   • Annual Exam     review of medical issues        HPI:  Noemi Fowler is a 64 y.o. female RTC in yearly CPE/ AWV, review of medical issues: Has been doing well. Is less active caring for family.  Has gained weight.    1. Hx of epigastric pain in past s/p lap cristiano and appy and subsequent mild gastritis and Schatzki's ring/ spastic esophagus on EGD in 2017 with return of same abdominal pain sx in 2019 - has started back on PPI now and taking daily.  Has sx now q 3-4 days. Will last 10 seconds when occurs and then resolves. No loss of appetite. No weight loss.  No early satiety.  No N/V. NO GI f/u planned at this time.   2. Essential Tremor - dx'd in 2015, after (-) DAMIAN scan, no progression. \"it is getting worse\".  Not limiting.  Remains off gabapentin > 1 year. Will see neurology upcoming in 3/2020.   3. Neuropathy - tingling on whole L side of body and has had extensive w/u with Neurology, unclear etiology. Not really changed much. Remains off Gabapentin as did not help.   4. Cervical spine DJD/ DDD s/p fusion at C5-C6 and s/p epidurals in past -  No recurrence of those issues.  CHAR.   5. OA, in hands and knees - pain > stiffness are \"there\" but is stable.  Still on glucosamine and thinks does helps.  No pain meds used.   6. Urge Incontinence - \"Still terrible\".  Has seen PT but noted did not have time to comply issues.  Pt was not able to do it and deferred.  Thinks is more feasible now, 'I'd be willing to give it another try'.      Sleep is an issue. Cannot get comfortable. Has pain in R > L hip when lays on hip or \"when I put pressure on it\". NO trauma.  NO loss of ROM of hip noted.  NO skin changes.  Has not had eval'd. NOt affecting walking, just painful with pressure.     Saw that some of her numbers were up on labs. Worried well. Not sure what the numbers were.     The following portions of the patient's history were reviewed and updated as " appropriate: allergies, current medications, past family history, past medical history, past social history, past surgical history and problem list.    Review of Systems   Constitution: Positive for weight gain. Negative for chills, fever and malaise/fatigue.   HENT: Negative for congestion (at night, for last week. ), odynophagia and sore throat.    Eyes: Negative for discharge, double vision, pain and redness.        Last eye exam 10/2019; no glasses     Cardiovascular: Negative for chest pain, dyspnea on exertion, irregular heartbeat, leg swelling, near-syncope, palpitations and syncope.   Respiratory: Positive for snoring (if lays on back, not on sides). Negative for cough and shortness of breath.    Endocrine: Negative for polydipsia, polyphagia and polyuria.   Hematologic/Lymphatic: Negative for bleeding problem. Does not bruise/bleed easily.   Skin: Negative for rash and suspicious lesions.   Musculoskeletal: Positive for arthritis and joint pain (knees; soreness over lateral R hip, hurts wtih pressure at night only, no help with pillow mods.). Negative for joint swelling, muscle cramps, muscle weakness and myalgias.   Gastrointestinal: Negative for constipation, diarrhea, dysphagia, heartburn, nausea and vomiting.   Genitourinary: Positive for bladder incontinence (urgency) and urgency. Negative for dysuria, frequency, hematuria and hesitancy.   Neurological: Positive for excessive daytime sleepiness (variable, but sleeep is off with pain, mother distrubing her, anxiety issues. ). Negative for dizziness, headaches and light-headedness.   Psychiatric/Behavioral: Negative for depression. The patient has insomnia (hip pain; some anxiety and worry at night). The patient is not nervous/anxious.    Allergic/Immunologic: Negative for environmental allergies and persistent infections.       Patient Care Team:  Levon Barber MD as PCP - General  Levon Barber MD as PCP - Family Medicine    Recent  Hospitalizations: No recent hospitalization(s).    Depression Screen:   PHQ-2/PHQ-9 Depression Screening 12/27/2019   Little interest or pleasure in doing things 0   Feeling down, depressed, or hopeless 0   Trouble falling or staying asleep, or sleeping too much 0   Feeling tired or having little energy 0   Poor appetite or overeating 0   Feeling bad about yourself - or that you are a failure or have let yourself or your family down 0   Trouble concentrating on things, such as reading the newspaper or watching television 0   Moving or speaking so slowly that other people could have noticed. Or the opposite - being so fidgety or restless that you have been moving around a lot more than usual 0   Thoughts that you would be better off dead, or of hurting yourself in some way 0   Total Score 0   If you checked off any problems, how difficult have these problems made it for you to do your work, take care of things at home, or get along with other people? Not difficult at all       Functional and Cognitive Screening:  Functional & Cognitive Status 12/27/2019   Do you have difficulty preparing food and eating? No   Do you have difficulty bathing yourself, getting dressed or grooming yourself? No   Do you have difficulty using the toilet? No   Do you have difficulty moving around from place to place? No   Do you have trouble with steps or getting out of a bed or a chair? No   Current Diet Unhealthy Diet   Dental Exam Up to date   Eye Exam Up to date   Exercise (times per week) 1 times per week   Current Exercise Activities Include Walking   Do you need help using the phone?  No   Are you deaf or do you have serious difficulty hearing?  No   Do you need help with transportation? No   Do you need help shopping? No   Do you need help preparing meals?  No   Do you need help with housework?  No   Do you need help with laundry? No   Do you need help taking your medications? No   Do you need help managing money? No   Do you ever  "drive or ride in a car without wearing a seat belt? No   Have you felt unusual stress, anger or loneliness in the last month? No   Who do you live with? Spouse   If you need help, do you have trouble finding someone available to you? No   Have you been bothered in the last four weeks by sexual problems? No   Do you have difficulty concentrating, remembering or making decisions? No       Does the patient have evidence of cognitive impairment? no    Does not need ASA but is currently taking (advised patient that ASA is not indicated and patient chooses to stop it)    Vitals:    12/27/19 1003   BP: 110/80   Pulse: 76   Resp: 12   Temp: 98.3 °F (36.8 °C)   SpO2: 98%   Weight: 83.9 kg (185 lb)   Height: 170.2 cm (67\")   PainSc: 0-No pain       Body mass index is 28.98 kg/m².    Visual Acuity:  No exam data present    Advanced Care Planning:  Patient has an advance directive - a copy has not been provided. Have asked the patient to send this to us to add to record    Objective   Physical Exam   Constitutional: She is oriented to person, place, and time. She appears well-developed and well-nourished. She does not have a sickly appearance. She does not appear ill. No distress.   HENT:   Head: Normocephalic and atraumatic.   Right Ear: Hearing, tympanic membrane, external ear and ear canal normal.   Left Ear: Hearing, tympanic membrane, external ear and ear canal normal.   Nose: Nose normal.   Mouth/Throat: Uvula is midline, oropharynx is clear and moist and mucous membranes are normal. No oropharyngeal exudate.   Eyes: Pupils are equal, round, and reactive to light. Conjunctivae, EOM and lids are normal. Right eye exhibits no discharge. Left eye exhibits no discharge. No scleral icterus.   Neck: Trachea normal, normal range of motion and full passive range of motion without pain. Neck supple. Carotid bruit is not present. No thyroid mass and no thyromegaly present.   Cardiovascular: Normal rate, regular rhythm, S1 normal, " S2 normal, normal heart sounds and intact distal pulses. Exam reveals no gallop and no friction rub.   No murmur heard.  Pulses:       Radial pulses are 2+ on the right side, and 2+ on the left side.        Dorsalis pedis pulses are 2+ on the right side, and 2+ on the left side.        Posterior tibial pulses are 2+ on the right side, and 2+ on the left side.   Pulmonary/Chest: Effort normal and breath sounds normal. No respiratory distress. She has no wheezes. She has no rhonchi. She has no rales.   Abdominal: Soft. Normal appearance and bowel sounds are normal. She exhibits no distension and no mass. There is no hepatosplenomegaly. There is no tenderness. There is no rebound and no guarding.   Musculoskeletal: She exhibits no edema.        Right hip: She exhibits decreased range of motion (slight) and bony tenderness (over trochanteric bursa). She exhibits normal strength and no tenderness.        Left hip: She exhibits decreased range of motion (slight) and bony tenderness (over trochanteric bursa). She exhibits normal strength and no tenderness.        Right knee: She exhibits deformity (bony hypertrophy noted). She exhibits no swelling and no effusion.        Left knee: She exhibits deformity (bony hypertrophy noted). She exhibits no swelling and no effusion.        Right hand: She exhibits deformity (DIP joint hypertrophy). She exhibits normal range of motion, no tenderness and no bony tenderness. Normal strength noted.        Left hand: She exhibits deformity (DIP joint hypertrophy). She exhibits normal range of motion, no tenderness and no bony tenderness. Normal strength noted.     Vascular Status -  Her right foot exhibits normal foot vasculature  and no edema. Her left foot exhibits normal foot vasculature  and no edema.  Lymphadenopathy:     She has no cervical adenopathy.     She has no axillary adenopathy.        Right: No inguinal adenopathy present.        Left: No inguinal adenopathy present.    Neurological: She is alert and oriented to person, place, and time. She has normal strength and normal reflexes. She displays tremor (mild, low amp high freq L >> R hand, action). No cranial nerve deficit or sensory deficit. She exhibits normal muscle tone. Gait normal.   Skin: Skin is warm and dry. No rash noted.   Psychiatric: She has a normal mood and affect. Her behavior is normal. Thought content normal. Cognition and memory are normal.   Vitals reviewed.      Assessment/Plan   Problems Addressed this Visit     Osteoarthritis of hand    Urge incontinence of urine    Relevant Orders    Ambulatory Referral to Physical Therapy Pelvic Floor, Evaluate and treat    Disc disorder of cervical region    Osteoarthritis of knee    Essential tremor    History of peripheral neuropathy    Dyslipidemia    Gastroesophageal reflux disease without esophagitis    Schatzki's ring    Esophageal dysmotility    Subclinical hypothyroidism      Other Visit Diagnoses     Healthcare maintenance    -  Primary    Relevant Orders    Pneumococcal Conjugate Vaccine 13-Valent All (Completed)    Liver enzyme elevation        Relevant Medications    Omega-3 Fatty Acids (FISH OIL) 1000 MG capsule capsule    Other Relevant Orders    US abdomen limited    Trochanteric bursitis of both hips        Relevant Orders    Ambulatory Referral to Orthopedic Surgery (Completed)    Encounter for immunization         Relevant Orders    Pneumococcal Conjugate Vaccine 13-Valent All (Completed)              Diagnoses and all orders for this visit:    Healthcare maintenance  -     Pneumococcal Conjugate Vaccine 13-Valent All    Urge incontinence of urine  -     Ambulatory Referral to Physical Therapy Pelvic Floor, Evaluate and treat    Esophageal dysmotility    Gastroesophageal reflux disease without esophagitis    Schatzki's ring    Essential tremor    Disc disorder of cervical region    Primary osteoarthritis of both hands    Primary osteoarthritis of both  knees    Dyslipidemia    History of peripheral neuropathy    Liver enzyme elevation  -     US abdomen limited; Future  -     Omega-3 Fatty Acids (FISH OIL) 1000 MG capsule capsule; Take 2 capsules by mouth 2 (Two) Times a Day With Meals.    Trochanteric bursitis of both hips  -     Ambulatory Referral to Orthopedic Surgery    Encounter for immunization   -     Pneumococcal Conjugate Vaccine 13-Valent All    Subclinical hypothyroidism    Other orders  -     Magnesium 500 MG capsule; Take  by mouth Daily.  -     Cholecalciferol (VITAMIN D3 PO); Take 2,000 Units by mouth Daily.        Noemi Fowler is a 64 y.o. female RTC in yearly CPE/ AWV, review of medical issues:   1. Hx of epigastric pain in past s/p lap cristiano and appy and subsequent mild gastritis and Schatzki's ring/ spastic esophagus on EGD in 2017 with return of same abdominal pain sx in 2019 - improved back on PPI daily, however brief rare pain sx lasting about 10 seconds. Unclear etiology at this time but will have pt f/u with Dr. Arredondo in GI for further eval given prior extensive w/u.Still feel Ddx here includes gastritis vs. PUD vs. FD.  As FD dx of exlusion and my require repeat CT (last 2015) or EGD.  F/U GI PRN.   2. Overweight - diet OK, but off exercise. Urged pt to restart regular exercise/ work routine.    3. Dyslipidemia - numbers stable, recalling statins deferred with low CR in 2016.  C/W improved diet and advised to add CV exercise.    4. Essential Tremor - dx'd in 2015, after (-) DAMIAN scan, mild progression. Off gabapentin > 2 years, declines other meds. Will see neurology upcoming in 3/2020.   5. Neuropathy,  tingling on whole L side of body - s/p  extensive w/u with Neurology, unclear etiology.  Remains off Gabapentin for lack of benefit.  D/C B complex vitamin.  F/U neurology 3/2020.  6. Cervical spine DJD/ DDD s/p fusion at C5-C6 and s/p epidurals in past -  CHAR.   7. OA, in hands and knees - stiffness >> pain.  C/W glucosamine and  activity  Off NSAIDs.   8. Urge Incontinence - persists.  Agrees to PT trial now that has time for compliance to regimen.  Referral placed. U/A clear on labs.   9. LFT elevation - noted on labs, variable in past but progressive today. Suspect fatty liver, but will get U/S to eval (noted past hemangioma). Increase fish oil to 3000mg daily.  Weight loss advised. Serologic w/u if progressive.   10. Trochanteric bursitis B - acute issue/ new dx today, affecting sleep. Exam is compatible.  Will refer to ortho for injx based tx as pt and I prefer to avoid oral steroids.  11. Subclinical hypothyroidism - new issue on labs. Pt asx'ic.  Will trend next labs with anti-TPO Ab.   12. HM - labs d/w pt; Flu/ Tdap/ Hep A/ Zostavax/ Shingrix - UTD; Prevnar today; C-scope 12/2015 (-) --> f/u 5 years; Pap / Mammo UTD 9/2019 with Gyn, f/u yearly; DEXA normal 7/2016 per Gyn --> repeat prior to next visit; Hep C Ab (-) 2014; AAA (-) on 2015 CT A/P; add back exercise    Return in about 1 year (around 12/27/2020) for Medicare Wellness, Annual physical. (include TSH and anti-TPO Ab)          Current Outpatient Medications:   •  Calcium Carb-Cholecalciferol (CALCIUM + D3) 600-200 MG-UNIT tablet, Take  by mouth., Disp: , Rfl:   •  Cholecalciferol (VITAMIN D3 PO), Take 2,000 Units by mouth Daily., Disp: , Rfl:   •  Coenzyme Q10 (COQ10) 200 MG capsule, Take  by mouth., Disp: , Rfl:   •  Magnesium 100 MG tablet, Take  by mouth., Disp: , Rfl:   •  Magnesium 500 MG capsule, Take  by mouth Daily., Disp: , Rfl:   •  Misc Natural Products (GLUCOS-CHONDROIT-MSM COMPLEX) tablet, Take  by mouth., Disp: , Rfl:   •  MULTIPLE VITAMINS ESSENTIAL PO, Take  by mouth., Disp: , Rfl:   •  Omega-3 Fatty Acids (FISH OIL) 1000 MG capsule capsule, Take 2 capsules by mouth 2 (Two) Times a Day With Meals., Disp: , Rfl:   •  omeprazole (priLOSEC) 40 MG capsule, Take 1 capsule by mouth Daily., Disp: 30 capsule, Rfl: 3

## 2020-01-03 ENCOUNTER — HOSPITAL ENCOUNTER (OUTPATIENT)
Dept: ULTRASOUND IMAGING | Facility: HOSPITAL | Age: 65
Discharge: HOME | End: 2020-01-03
Admitting: INTERNAL MEDICINE

## 2020-01-03 DIAGNOSIS — R74.8 LIVER ENZYME ELEVATION: ICD-10-CM

## 2020-01-03 PROCEDURE — 76705 ECHO EXAM OF ABDOMEN: CPT

## 2020-02-11 DIAGNOSIS — K22.2 SCHATZKI'S RING: ICD-10-CM

## 2020-02-11 DIAGNOSIS — K21.9 GASTROESOPHAGEAL REFLUX DISEASE WITHOUT ESOPHAGITIS: ICD-10-CM

## 2020-02-11 DIAGNOSIS — K22.4 ESOPHAGEAL DYSMOTILITY: ICD-10-CM

## 2020-02-11 RX ORDER — OMEPRAZOLE 40 MG/1
CAPSULE, DELAYED RELEASE ORAL
Qty: 30 CAPSULE | Refills: 3 | Status: SHIPPED | OUTPATIENT
Start: 2020-02-11 | End: 2020-06-10

## 2020-03-12 ENCOUNTER — OFFICE VISIT (OUTPATIENT)
Dept: INTERNAL MEDICINE | Facility: CLINIC | Age: 65
End: 2020-03-12

## 2020-03-12 VITALS
SYSTOLIC BLOOD PRESSURE: 120 MMHG | BODY MASS INDEX: 28.88 KG/M2 | WEIGHT: 184 LBS | HEART RATE: 75 BPM | TEMPERATURE: 98.9 F | DIASTOLIC BLOOD PRESSURE: 84 MMHG | OXYGEN SATURATION: 98 % | HEIGHT: 67 IN

## 2020-03-12 DIAGNOSIS — R50.9 FEVER, UNSPECIFIED FEVER CAUSE: ICD-10-CM

## 2020-03-12 DIAGNOSIS — J02.9 SORE THROAT: ICD-10-CM

## 2020-03-12 DIAGNOSIS — J20.9 ACUTE BRONCHITIS, UNSPECIFIED ORGANISM: Primary | ICD-10-CM

## 2020-03-12 DIAGNOSIS — Z20.828 EXPOSURE TO INFLUENZA: ICD-10-CM

## 2020-03-12 DIAGNOSIS — R05.9 COUGH: ICD-10-CM

## 2020-03-12 LAB
EXPIRATION DATE: NORMAL
FLUAV AG NPH QL: NEGATIVE
FLUBV AG NPH QL: NEGATIVE
INTERNAL CONTROL: NORMAL
Lab: NORMAL

## 2020-03-12 PROCEDURE — 99214 OFFICE O/P EST MOD 30 MIN: CPT | Performed by: INTERNAL MEDICINE

## 2020-03-12 PROCEDURE — 87804 INFLUENZA ASSAY W/OPTIC: CPT | Performed by: INTERNAL MEDICINE

## 2020-03-12 RX ORDER — PROMETHAZINE HYDROCHLORIDE AND CODEINE PHOSPHATE 6.25; 1 MG/5ML; MG/5ML
5 SYRUP ORAL NIGHTLY PRN
Qty: 60 ML | Refills: 0 | Status: SHIPPED | OUTPATIENT
Start: 2020-03-12 | End: 2020-12-30

## 2020-03-12 RX ORDER — ACETAMINOPHEN 500 MG
TABLET ORAL
Qty: 30 TABLET | Refills: 0
Start: 2020-03-12 | End: 2021-12-08

## 2020-03-12 RX ORDER — GUAIFENESIN AND DEXTROMETHORPHAN HYDROBROMIDE 1200; 60 MG/1; MG/1
TABLET, EXTENDED RELEASE ORAL
Qty: 28 EACH | Refills: 0
Start: 2020-03-12 | End: 2020-12-30

## 2020-03-12 RX ORDER — SOD CHLOR,BICARB/SQUEEZ BOTTLE
1 PACKET, WITH RINSE DEVICE NASAL 3 TIMES DAILY
Qty: 1 EACH | Refills: 0
Start: 2020-03-12 | End: 2020-12-30

## 2020-03-12 NOTE — PROGRESS NOTES
"Sore Throat; Fever; and Cough      HPI  Noemi Fowler is a 64 y.o. female RTC In acute care:  \"it is going around in my family\".  Notes that daugther in law has the flu.  Grandkids are coughing as well.  Pt started to feel bad about 2 days ago in middle of night.  Had a fever that AM but felt like it.  No chills.  Went to get thermometer and had 99.6 at home as the highest. Has sore thorat, \"oh, ya\".  No body aches.  \"Bad cough and really hurts my chest\".  Yellow mucous from chest.  No sinus sx or congestion.    Meds: TheraFlu, last dose this AM at 4AM.  No recent travel.  No COVID19 exposure known.      Review of Systems   Constitution: Positive for fever and malaise/fatigue. Negative for chills.   HENT: Positive for sore throat. Negative for congestion, ear discharge, ear pain and hoarse voice.    Cardiovascular: Negative for dyspnea on exertion.   Respiratory: Positive for cough and sputum production. Negative for shortness of breath and wheezing.    Musculoskeletal: Negative for myalgias.   Gastrointestinal: Negative for diarrhea, nausea and vomiting.       The following portions of the patient's history were reviewed and updated as appropriate: allergies, current medications, past medical history, past social history and problem list.      Current Outpatient Medications:   •  Calcium Carb-Cholecalciferol (CALCIUM + D3) 600-200 MG-UNIT tablet, Take  by mouth., Disp: , Rfl:   •  Cholecalciferol (VITAMIN D3 PO), Take 2,000 Units by mouth Daily., Disp: , Rfl:   •  Coenzyme Q10 (COQ10) 200 MG capsule, Take  by mouth., Disp: , Rfl:   •  Magnesium 100 MG tablet, Take  by mouth., Disp: , Rfl:   •  Magnesium 500 MG capsule, Take  by mouth Daily., Disp: , Rfl:   •  Misc Natural Products (GLUCOS-CHONDROIT-MSM COMPLEX) tablet, Take  by mouth., Disp: , Rfl:   •  MULTIPLE VITAMINS ESSENTIAL PO, Take  by mouth., Disp: , Rfl:   •  Omega-3 Fatty Acids (FISH OIL) 1000 MG capsule capsule, Take 2 capsules by mouth 2 (Two) " "Times a Day With Meals., Disp: , Rfl:   •  omeprazole (priLOSEC) 40 MG capsule, TAKE 1 CAPSULE BY MOUTH EVERY DAY, Disp: 30 capsule, Rfl: 3  •  acetaminophen (TYLENOL) 500 MG tablet, 2 tabs PO Q 8 hours PRN, Disp: 30 tablet, Rfl: 0  •  Dextromethorphan-guaiFENesin (MUCINEX DM MAXIMUM STRENGTH)  MG tablet sustained-release 12 hour, One PO BID, Disp: 28 each, Rfl: 0  •  Hypertonic Nasal Wash (SINUS RINSE BOTTLE KIT) pack, 1 bottle into the nostril(s) as directed by provider 3 (Three) Times a Day., Disp: 1 each, Rfl: 0  •  promethazine-codeine (PHENERGAN with CODEINE) 6.25-10 MG/5ML syrup, Take 5 mL by mouth At Night As Needed for Cough., Disp: 60 mL, Rfl: 0    Vitals:    03/12/20 0851   BP: 120/84   Pulse: 75   Temp: 98.9 °F (37.2 °C)   SpO2: 98%   Weight: 83.5 kg (184 lb)   Height: 170.2 cm (67.01\")     Body mass index is 28.81 kg/m².      Physical Exam   Constitutional: She is oriented to person, place, and time. She appears well-developed and well-nourished. She does not have a sickly appearance. She does not appear ill. No distress.   HENT:   Head: Normocephalic and atraumatic.   Right Ear: Tympanic membrane, external ear and ear canal normal.   Left Ear: Tympanic membrane, external ear and ear canal normal.   Nose: No mucosal edema or rhinorrhea. Right sinus exhibits no maxillary sinus tenderness and no frontal sinus tenderness. Left sinus exhibits no maxillary sinus tenderness and no frontal sinus tenderness.   Mouth/Throat: Uvula is midline. Mucous membranes are not pale, not dry and not cyanotic. No oral lesions. Posterior oropharyngeal edema (mild) present. No oropharyngeal exudate or posterior oropharyngeal erythema.   Eyes: Pupils are equal, round, and reactive to light. Conjunctivae are normal. Right eye exhibits no discharge. Left eye exhibits no discharge.   Neck: Normal range of motion. Neck supple.   Cardiovascular: Normal rate, regular rhythm and normal heart sounds.   Pulmonary/Chest: Effort " normal and breath sounds normal. No tachypnea. No respiratory distress. She has no decreased breath sounds. She has no wheezes. She has no rhonchi. She has no rales.   No egophony   Lymphadenopathy:     She has no cervical adenopathy.   Neurological: She is alert and oriented to person, place, and time. No cranial nerve deficit.   Psychiatric: She has a normal mood and affect. Her behavior is normal.   Vitals reviewed.    Results for orders placed or performed in visit on 03/12/20   POC Influenza A / B   Result Value Ref Range    Rapid Influenza A Ag Negative Negative    Rapid Influenza B Ag Negative Negative    Internal Control Passed Passed    Lot Number [id number]     Expiration Date 12/11/2021        Assessment/ Plan  Diagnoses and all orders for this visit:    Acute bronchitis, unspecified organism  -     POC Influenza A / B  -     Hypertonic Nasal Wash (SINUS RINSE BOTTLE KIT) pack; 1 bottle into the nostril(s) as directed by provider 3 (Three) Times a Day.  -     Dextromethorphan-guaiFENesin (MUCINEX DM MAXIMUM STRENGTH)  MG tablet sustained-release 12 hour; One PO BID  -     promethazine-codeine (PHENERGAN with CODEINE) 6.25-10 MG/5ML syrup; Take 5 mL by mouth At Night As Needed for Cough.    Fever, unspecified fever cause  -     POC Influenza A / B  -     Hypertonic Nasal Wash (SINUS RINSE BOTTLE KIT) pack; 1 bottle into the nostril(s) as directed by provider 3 (Three) Times a Day.  -     acetaminophen (TYLENOL) 500 MG tablet; 2 tabs PO Q 8 hours PRN    Sore throat  -     POC Influenza A / B  -     Hypertonic Nasal Wash (SINUS RINSE BOTTLE KIT) pack; 1 bottle into the nostril(s) as directed by provider 3 (Three) Times a Day.  -     acetaminophen (TYLENOL) 500 MG tablet; 2 tabs PO Q 8 hours PRN    Exposure to influenza  -     Hypertonic Nasal Wash (SINUS RINSE BOTTLE KIT) pack; 1 bottle into the nostril(s) as directed by provider 3 (Three) Times a Day.    Cough  -     promethazine-codeine (PHENERGAN  with CODEINE) 6.25-10 MG/5ML syrup; Take 5 mL by mouth At Night As Needed for Cough.        Return for Next scheduled follow up.      Discussion:  Noemi Folwer is a 64 y.o. female RTC In acute care (new issue to examiner) with ~48 hours of acute bronchitis and low grade fever events,known flu exposure. Exam today is largely benign with only mild edema in posterior pharynx. Flu swab negative.   I suspect viral etiology.  Reassured pt today. Will have pt start meds as noted above, aggressive nasal toilet.  Rx cough syrup for nighttime use.  Pt to call or RTC if T> 100.5, accelerating fever curve, SOA, double sickness, or failure to improve.

## 2020-06-10 DIAGNOSIS — K22.2 SCHATZKI'S RING: ICD-10-CM

## 2020-06-10 DIAGNOSIS — K21.9 GASTROESOPHAGEAL REFLUX DISEASE WITHOUT ESOPHAGITIS: ICD-10-CM

## 2020-06-10 DIAGNOSIS — K22.4 ESOPHAGEAL DYSMOTILITY: ICD-10-CM

## 2020-06-10 RX ORDER — OMEPRAZOLE 40 MG/1
CAPSULE, DELAYED RELEASE ORAL
Qty: 30 CAPSULE | Refills: 3 | Status: SHIPPED | OUTPATIENT
Start: 2020-06-10 | End: 2020-10-14

## 2020-07-01 ENCOUNTER — OFFICE (OUTPATIENT)
Dept: URBAN - METROPOLITAN AREA CLINIC 66 | Facility: CLINIC | Age: 65
End: 2020-07-01
Payer: MEDICARE

## 2020-07-01 VITALS
WEIGHT: 178 LBS | HEIGHT: 67 IN | HEART RATE: 75 BPM | SYSTOLIC BLOOD PRESSURE: 139 MMHG | TEMPERATURE: 98.6 F | DIASTOLIC BLOOD PRESSURE: 84 MMHG

## 2020-07-01 DIAGNOSIS — K22.4 DYSKINESIA OF ESOPHAGUS: ICD-10-CM

## 2020-07-01 DIAGNOSIS — Z80.0 FAMILY HISTORY OF MALIGNANT NEOPLASM OF DIGESTIVE ORGANS: ICD-10-CM

## 2020-07-01 DIAGNOSIS — Z83.71 FAMILY HISTORY OF COLONIC POLYPS: ICD-10-CM

## 2020-07-01 DIAGNOSIS — K21.9 GASTRO-ESOPHAGEAL REFLUX DISEASE WITHOUT ESOPHAGITIS: ICD-10-CM

## 2020-07-01 PROCEDURE — 99214 OFFICE O/P EST MOD 30 MIN: CPT | Performed by: INTERNAL MEDICINE

## 2020-07-27 ENCOUNTER — AMBULATORY SURGICAL CENTER (OUTPATIENT)
Dept: URBAN - METROPOLITAN AREA SURGERY 20 | Facility: SURGERY | Age: 65
End: 2020-07-27
Payer: MEDICARE

## 2020-07-27 ENCOUNTER — OFFICE (OUTPATIENT)
Dept: URBAN - METROPOLITAN AREA PATHOLOGY 4 | Facility: PATHOLOGY | Age: 65
End: 2020-07-27
Payer: MEDICARE

## 2020-07-27 DIAGNOSIS — K63.5 POLYP OF COLON: ICD-10-CM

## 2020-07-27 DIAGNOSIS — K29.40 CHRONIC ATROPHIC GASTRITIS WITHOUT BLEEDING: ICD-10-CM

## 2020-07-27 DIAGNOSIS — K21.9 GASTRO-ESOPHAGEAL REFLUX DISEASE WITHOUT ESOPHAGITIS: ICD-10-CM

## 2020-07-27 DIAGNOSIS — Z83.71 FAMILY HISTORY OF COLONIC POLYPS: ICD-10-CM

## 2020-07-27 DIAGNOSIS — K44.9 DIAPHRAGMATIC HERNIA WITHOUT OBSTRUCTION OR GANGRENE: ICD-10-CM

## 2020-07-27 PROBLEM — K31.89 OTHER DISEASES OF STOMACH AND DUODENUM: Status: ACTIVE | Noted: 2020-07-27

## 2020-07-27 PROCEDURE — 45380 COLONOSCOPY AND BIOPSY: CPT | Mod: 33 | Performed by: INTERNAL MEDICINE

## 2020-07-27 PROCEDURE — 88305 TISSUE EXAM BY PATHOLOGIST: CPT | Performed by: INTERNAL MEDICINE

## 2020-07-27 PROCEDURE — 43239 EGD BIOPSY SINGLE/MULTIPLE: CPT | Performed by: INTERNAL MEDICINE

## 2020-10-14 DIAGNOSIS — K22.2 SCHATZKI'S RING: ICD-10-CM

## 2020-10-14 DIAGNOSIS — K21.9 GASTROESOPHAGEAL REFLUX DISEASE WITHOUT ESOPHAGITIS: ICD-10-CM

## 2020-10-14 DIAGNOSIS — K22.4 ESOPHAGEAL DYSMOTILITY: ICD-10-CM

## 2020-10-14 RX ORDER — OMEPRAZOLE 40 MG/1
CAPSULE, DELAYED RELEASE ORAL
Qty: 30 CAPSULE | Refills: 3 | Status: SHIPPED | OUTPATIENT
Start: 2020-10-14 | End: 2021-01-18

## 2020-10-29 ENCOUNTER — APPOINTMENT (OUTPATIENT)
Dept: WOMENS IMAGING | Facility: HOSPITAL | Age: 65
End: 2020-10-29

## 2020-10-29 PROCEDURE — 77067 SCR MAMMO BI INCL CAD: CPT | Performed by: RADIOLOGY

## 2020-10-29 PROCEDURE — 77063 BREAST TOMOSYNTHESIS BI: CPT | Performed by: RADIOLOGY

## 2020-12-16 DIAGNOSIS — N39.41 URGE INCONTINENCE OF URINE: ICD-10-CM

## 2020-12-16 DIAGNOSIS — E78.5 DYSLIPIDEMIA: ICD-10-CM

## 2020-12-16 DIAGNOSIS — Z00.00 HEALTHCARE MAINTENANCE: Primary | ICD-10-CM

## 2020-12-16 DIAGNOSIS — E03.8 SUBCLINICAL HYPOTHYROIDISM: ICD-10-CM

## 2020-12-24 LAB
ALBUMIN SERPL-MCNC: 4.6 G/DL (ref 3.5–5.2)
ALBUMIN/GLOB SERPL: 1.5 G/DL
ALP SERPL-CCNC: 110 U/L (ref 39–117)
ALT SERPL-CCNC: 34 U/L (ref 1–33)
APPEARANCE UR: CLEAR
AST SERPL-CCNC: 28 U/L (ref 1–32)
BACTERIA #/AREA URNS HPF: NORMAL /HPF
BASOPHILS # BLD AUTO: 0.03 10*3/MM3 (ref 0–0.2)
BASOPHILS NFR BLD AUTO: 0.5 % (ref 0–1.5)
BILIRUB SERPL-MCNC: 0.4 MG/DL (ref 0–1.2)
BILIRUB UR QL STRIP: NEGATIVE
BUN SERPL-MCNC: 15 MG/DL (ref 8–23)
BUN/CREAT SERPL: 16.3 (ref 7–25)
CALCIUM SERPL-MCNC: 9.3 MG/DL (ref 8.6–10.5)
CHLORIDE SERPL-SCNC: 104 MMOL/L (ref 98–107)
CHOLEST SERPL-MCNC: 231 MG/DL (ref 0–200)
CO2 SERPL-SCNC: 28.3 MMOL/L (ref 22–29)
COLOR UR: YELLOW
CREAT SERPL-MCNC: 0.92 MG/DL (ref 0.57–1)
EOSINOPHIL # BLD AUTO: 0.06 10*3/MM3 (ref 0–0.4)
EOSINOPHIL NFR BLD AUTO: 1 % (ref 0.3–6.2)
EPI CELLS #/AREA URNS HPF: NORMAL /HPF (ref 0–10)
ERYTHROCYTE [DISTWIDTH] IN BLOOD BY AUTOMATED COUNT: 13.4 % (ref 12.3–15.4)
GLOBULIN SER CALC-MCNC: 3.1 GM/DL
GLUCOSE SERPL-MCNC: 95 MG/DL (ref 65–99)
GLUCOSE UR QL: NEGATIVE
HCT VFR BLD AUTO: 41.3 % (ref 34–46.6)
HDLC SERPL-MCNC: 53 MG/DL (ref 40–60)
HGB BLD-MCNC: 13.1 G/DL (ref 12–15.9)
HGB UR QL STRIP: NEGATIVE
IMM GRANULOCYTES # BLD AUTO: 0.02 10*3/MM3 (ref 0–0.05)
IMM GRANULOCYTES NFR BLD AUTO: 0.3 % (ref 0–0.5)
KETONES UR QL STRIP: NEGATIVE
LDLC SERPL CALC-MCNC: 163 MG/DL (ref 0–100)
LEUKOCYTE ESTERASE UR QL STRIP: NEGATIVE
LYMPHOCYTES # BLD AUTO: 2.42 10*3/MM3 (ref 0.7–3.1)
LYMPHOCYTES NFR BLD AUTO: 42.1 % (ref 19.6–45.3)
MCH RBC QN AUTO: 26.6 PG (ref 26.6–33)
MCHC RBC AUTO-ENTMCNC: 31.7 G/DL (ref 31.5–35.7)
MCV RBC AUTO: 83.8 FL (ref 79–97)
MICRO URNS: NORMAL
MICRO URNS: NORMAL
MONOCYTES # BLD AUTO: 0.53 10*3/MM3 (ref 0.1–0.9)
MONOCYTES NFR BLD AUTO: 9.2 % (ref 5–12)
MUCOUS THREADS URNS QL MICRO: PRESENT /HPF
NEUTROPHILS # BLD AUTO: 2.69 10*3/MM3 (ref 1.7–7)
NEUTROPHILS NFR BLD AUTO: 46.9 % (ref 42.7–76)
NITRITE UR QL STRIP: NEGATIVE
NRBC BLD AUTO-RTO: 0 /100 WBC (ref 0–0.2)
PH UR STRIP: 7 [PH] (ref 5–7.5)
PLATELET # BLD AUTO: 206 10*3/MM3 (ref 140–450)
POTASSIUM SERPL-SCNC: 4.7 MMOL/L (ref 3.5–5.2)
PROT SERPL-MCNC: 7.7 G/DL (ref 6–8.5)
PROT UR QL STRIP: NEGATIVE
RBC # BLD AUTO: 4.93 10*6/MM3 (ref 3.77–5.28)
RBC #/AREA URNS HPF: NORMAL /HPF (ref 0–2)
SODIUM SERPL-SCNC: 140 MMOL/L (ref 136–145)
SP GR UR: 1.02 (ref 1–1.03)
T4 FREE SERPL-MCNC: 1.03 NG/DL (ref 0.93–1.7)
TRIGL SERPL-MCNC: 84 MG/DL (ref 0–150)
TSH SERPL DL<=0.005 MIU/L-ACNC: 3.21 UIU/ML (ref 0.27–4.2)
URINALYSIS REFLEX: NORMAL
UROBILINOGEN UR STRIP-MCNC: 0.2 MG/DL (ref 0.2–1)
VLDLC SERPL CALC-MCNC: 15 MG/DL (ref 5–40)
WBC # BLD AUTO: 5.75 10*3/MM3 (ref 3.4–10.8)
WBC #/AREA URNS HPF: NORMAL /HPF (ref 0–5)

## 2020-12-30 ENCOUNTER — OFFICE VISIT (OUTPATIENT)
Dept: INTERNAL MEDICINE | Facility: CLINIC | Age: 65
End: 2020-12-30

## 2020-12-30 VITALS
OXYGEN SATURATION: 98 % | DIASTOLIC BLOOD PRESSURE: 80 MMHG | SYSTOLIC BLOOD PRESSURE: 118 MMHG | TEMPERATURE: 96.9 F | HEIGHT: 67 IN | HEART RATE: 90 BPM | RESPIRATION RATE: 12 BRPM | WEIGHT: 180 LBS | BODY MASS INDEX: 28.25 KG/M2

## 2020-12-30 DIAGNOSIS — M50.90 DISC DISORDER OF CERVICAL REGION: ICD-10-CM

## 2020-12-30 DIAGNOSIS — E03.8 SUBCLINICAL HYPOTHYROIDISM: ICD-10-CM

## 2020-12-30 DIAGNOSIS — M19.041 PRIMARY OSTEOARTHRITIS OF BOTH HANDS: ICD-10-CM

## 2020-12-30 DIAGNOSIS — M17.0 PRIMARY OSTEOARTHRITIS OF BOTH KNEES: ICD-10-CM

## 2020-12-30 DIAGNOSIS — Z00.00 HEALTHCARE MAINTENANCE: Primary | ICD-10-CM

## 2020-12-30 DIAGNOSIS — M70.61 TROCHANTERIC BURSITIS OF BOTH HIPS: ICD-10-CM

## 2020-12-30 DIAGNOSIS — K21.9 GASTROESOPHAGEAL REFLUX DISEASE WITHOUT ESOPHAGITIS: ICD-10-CM

## 2020-12-30 DIAGNOSIS — Z23 ENCOUNTER FOR IMMUNIZATION: ICD-10-CM

## 2020-12-30 DIAGNOSIS — E78.5 DYSLIPIDEMIA: ICD-10-CM

## 2020-12-30 DIAGNOSIS — Z86.69 HISTORY OF PERIPHERAL NEUROPATHY: ICD-10-CM

## 2020-12-30 DIAGNOSIS — G25.0 ESSENTIAL TREMOR: ICD-10-CM

## 2020-12-30 DIAGNOSIS — M70.62 TROCHANTERIC BURSITIS OF BOTH HIPS: ICD-10-CM

## 2020-12-30 DIAGNOSIS — N39.41 URGE INCONTINENCE OF URINE: ICD-10-CM

## 2020-12-30 DIAGNOSIS — M19.042 PRIMARY OSTEOARTHRITIS OF BOTH HANDS: ICD-10-CM

## 2020-12-30 PROBLEM — K22.2 SCHATZKI'S RING: Status: RESOLVED | Noted: 2017-04-21 | Resolved: 2020-12-30

## 2020-12-30 PROCEDURE — G0439 PPPS, SUBSEQ VISIT: HCPCS | Performed by: INTERNAL MEDICINE

## 2020-12-30 PROCEDURE — G0009 ADMIN PNEUMOCOCCAL VACCINE: HCPCS | Performed by: INTERNAL MEDICINE

## 2020-12-30 PROCEDURE — 90732 PPSV23 VACC 2 YRS+ SUBQ/IM: CPT | Performed by: INTERNAL MEDICINE

## 2020-12-30 PROCEDURE — 99397 PER PM REEVAL EST PAT 65+ YR: CPT | Performed by: INTERNAL MEDICINE

## 2020-12-30 NOTE — PATIENT INSTRUCTIONS
Medicare Wellness  Personal Prevention Plan of Service     Date of Office Visit:  2020  Encounter Provider:  Levon Barber MD  Place of Service:  [facility]  Patient Name: Noemi Fowler  :  1955    As part of the Medicare Wellness portion of your visit today, we are providing you with this personalized preventive plan of services (PPPS). This plan is based upon recommendations of the [place] Preventive Services Task Force (USPSTF) and the Advisory Committee on Immunization Practices (ACIP).    This lists the preventive care services that should be considered, and provides dates of when you are due. Items listed as completed are up-to-date and do not require any further intervention.    Health Maintenance   Topic Date Due   • DXA SCAN  2018   • ANNUAL WELLNESS VISIT  2021   • MAMMOGRAM  2022   • PAP SMEAR  2022   • TDAP/TD VACCINES (2 - Td) 2025   • COLONOSCOPY  2030   • HEPATITIS C SCREENING  Completed   • INFLUENZA VACCINE  Completed   • Pneumococcal Vaccine 65+  Completed   • ZOSTER VACCINE  Completed   • MENINGOCOCCAL VACCINE  Aged Out       Orders Placed This Encounter   Procedures   • Pneumococcal Polysaccharide Vaccine 23-Valent Greater Than or Equal To 3yo Subcutaneous / IM       Return in about 1 year (around 2021) for Medicare Wellness, Annual physical.

## 2020-12-30 NOTE — PROGRESS NOTES
"Annual Exam (review of medical issues ), gerd, and Arthritis      HPI  Noemi Fowler is a 65 y.o. female RTC in yearly CPE/ AWV, review of medical issues: Health has 'been fine'.   1. Hx of epigastric pain in past s/p lap cristiano and appy and subsequent mild gastritis and Schatzki's ring/ spastic esophagus on EGD in 2017 with return of same abdominal pain sx in 2019 - \"it is much, much, much better'.  Feels like omeprazole daily and 'no nearly as bas as it was\". Breakthrough sx are very infrequent. Diet is better, 'I am watching what I am etating\".  Swallowing Ok, no dysphagia noted.   2. Overweight -diet and exercise 'are going'. Very active watching grandkids.  Has new bike and plans to ride indoors, stationary bike.  3. Essential Tremor - dx'd in 2015, after (-) DAMIAN scan, mild progression. Off gabapentin > 2 years, declines other meds. \"Still there.\">  Really only noticeable when picking up with L hand. 'It does not bother me, but it is very noticeable'. Sees neurology in 2021.  4. Cervical spine DJD/ DDD s/p fusion at C5-C6 and s/p epidurals in past -  CHAR. No pain flares over year.   5. OA, in hands and knees - stiffness >> pain.  \"It is getting worse'.  Still more stiff as issue.  Is still taking glucosamine.     6. Urge Incontinence - persists.  NEver was able to work with PT due to schedules, but is working with sister who is PT and has given her info and plan to work with.     Review of Systems   Constitution: Positive for weight loss ( a few pounds). Negative for chills, fever and weight gain.   HENT: Negative for congestion, hearing loss, odynophagia and sore throat.    Eyes: Negative for discharge, double vision, pain and redness.        Last eye exam 2020, no glasses   Cardiovascular: Negative for chest pain, dyspnea on exertion, irregular heartbeat, leg swelling, near-syncope, palpitations and syncope.   Respiratory: Negative for cough, shortness of breath, sleep disturbances due to breathing and " "snoring.    Endocrine: Negative for polydipsia, polyphagia and polyuria.   Hematologic/Lymphatic: Negative for bleeding problem. Does not bruise/bleed easily.   Skin: Negative for rash and suspicious lesions.   Musculoskeletal: Positive for arthritis, joint pain (R and L lateral hip, bursitis. Had responded to injx in past from ortho. ) and stiffness. Negative for joint swelling, muscle cramps, muscle weakness and myalgias.   Gastrointestinal: Positive for heartburn (rare breakthrough). Negative for constipation, diarrhea, dysphagia, nausea and vomiting.   Genitourinary: Positive for bladder incontinence (\"yes, but I am working on that one too\". Using PT maneuvers.  Urgency is issue. ) and urgency (\"I cant hold it in\". ). Negative for dysuria, frequency, hematuria, hesitancy and incomplete emptying.   Neurological: Negative for excessive daytime sleepiness, dizziness, headaches and light-headedness.   Psychiatric/Behavioral: Negative for depression. The patient does not have insomnia (\"fair\", bursitis will affect. ) and is not nervous/anxious.    Allergic/Immunologic: Negative for environmental allergies and persistent infections.       Problem List:    Patient Active Problem List   Diagnosis   • Osteoarthritis of hand   • Urge incontinence of urine   • Disc disorder of cervical region   • Osteoarthritis of knee   • Essential tremor   • History of peripheral neuropathy   • Dyslipidemia   • Gastroesophageal reflux disease without esophagitis   • Nocturnal muscle cramps   • Esophageal dysmotility   • Trochanteric bursitis of both hips       Medical History:    Past Medical History:   Diagnosis Date   • Abnormal LFTs    • Abnormal liver function tests 10/11/2016   • Carotid artery plaque     lifeline screen only not noted on formal screen at [facility] 2014   • Carotid atherosclerosis 10/11/2016    Description: Lifeline screen only; Not noted on formal screen at [facility] 2014   • Cervical disc disorder, unspecified, " unspecified cervical region     djd/ddd c5-c6 fusion epiderals   • Essential tremor 10/17/2016   • Gastroesophageal reflux disease without esophagitis 4/21/2017    With noted Schatzki ring and esophageal spasm in 2017 EGD   • History of peripheral neuropathy     sees Dr. Jose Rafael drake only on L side   • Liver hemangioma     on CT scan x 2 in 2015   • Localized osteoarthritis of hand    • Mucocele, appendix    • Osteoarthritis, localized, knee    • Osteopenia     noted on lifeline screen not seen on formal hip/L-spine DEXA in 11/2014   • Pneumonia of right lower lobe due to infectious organism 3/20/2017    3/2017, resolved.    • PONV (postoperative nausea and vomiting)    • Post herpetic neuralgia    • Postherpetic neuralgia 10/17/2016   • Schatzki's ring 4/21/2017    On EGD 2017, s/p dilatation   • Subclinical hypothyroidism 12/27/2019    New issue on 12/2019 labs   • Urge incontinence of urine         Social History:    Social History     Socioeconomic History   • Marital status:      Spouse name: Not on file   • Number of children: 2   • Years of education: Not on file   • Highest education level: Not on file   Occupational History   • Occupation: Retired   Tobacco Use   • Smoking status: Former Smoker   • Smokeless tobacco: Never Used   • Tobacco comment: 5 pack/year history; quit in 20's.    Substance and Sexual Activity   • Alcohol use: Yes     Comment: 5 beers/ week (1-2 day/ time)   • Drug use: No   • Sexual activity: Yes     Partners: Male     Comment:  only; no hx STD's   Lifestyle   • Physical activity     Days per week: 0 days     Minutes per session: 0 min   • Stress: Not on file   Social History Narrative    Diet - variable, some fruits >> veggies    Exercise - None; active outside on property    Caffeine - 1-2 cups coffee/ day, trying to reduce       Family History:   Family History   Problem Relation Age of Onset   • Hypertension Mother    • Hypothyroidism Mother    • Dementia Mother    •  Leukemia Father         chronic lymphocytic   • Hypertension Father    • Breast cancer Sister    • Hypertension Sister    • Stroke Sister    • Alcohol abuse Sister         with cirrhosis, x 1 sister   • Depression Brother    • Hypertension Brother    • Colon cancer Maternal Grandmother    • Cancer Maternal Grandmother    • No Known Problems Son        Surgical History:   Past Surgical History:   Procedure Laterality Date   • APPENDECTOMY      11/11/15 due to mucocoele   • CERVICAL FUSION     • ENDOSCOPY N/A 1/27/2017    Procedure: ESOPHAGOGASTRODUODENOSCOPY WITH DILATATION GONZALEZ 52 FR,WITH BIOPSY;  Surgeon: Kofi Arredondo MD;  Location: [facility] ENDOSCOPY;  Service:    • EYE SURGERY      cataract surgery B 2012   • GALLBLADDER SURGERY      02/2016   • HYSTERECTOMY      pt has ovaries MARION only   • LASIK      bilateral   • OSTECTOMY      navicular   • SKIN BIOPSY     • SOFT TISSUE CYST EXCISION           Current Outpatient Medications:   •  acetaminophen (TYLENOL) 500 MG tablet, 2 tabs PO Q 8 hours PRN, Disp: 30 tablet, Rfl: 0  •  Calcium Carb-Cholecalciferol (CALCIUM + D3) 600-200 MG-UNIT tablet, Take  by mouth., Disp: , Rfl:   •  Cholecalciferol (VITAMIN D3 PO), Take 2,000 Units by mouth Daily., Disp: , Rfl:   •  Coenzyme Q10 (COQ10) 200 MG capsule, Take  by mouth., Disp: , Rfl:   •  Magnesium 500 MG capsule, Take  by mouth Daily., Disp: , Rfl:   •  Misc Natural Products (GLUCOS-CHONDROIT-MSM COMPLEX) tablet, Take  by mouth., Disp: , Rfl:   •  Omega-3 Fatty Acids (FISH OIL) 1000 MG capsule capsule, Take 2 capsules by mouth 2 (Two) Times a Day With Meals., Disp: , Rfl:   •  omeprazole (priLOSEC) 40 MG capsule, TAKE 1 CAPSULE BY MOUTH EVERY DAY, Disp: 30 capsule, Rfl: 3  •  psyllium (METAMUCIL) 58.6 % powder, Take  by mouth Daily., Disp: , Rfl: 12    Vitals:    12/30/20 0900   BP: 118/80   Pulse: 90   Resp: 12   Temp: 96.9 °F (36.1 °C)   SpO2: 98%     Body mass index is 28.19 kg/m².    Physical Exam  Vitals signs  reviewed.   Constitutional:       General: She is not in acute distress.     Appearance: Normal appearance. She is well-developed. She is not ill-appearing or toxic-appearing.   HENT:      Head: Normocephalic and atraumatic.      Right Ear: Hearing, tympanic membrane, ear canal and external ear normal.      Left Ear: Hearing, tympanic membrane, ear canal and external ear normal.      Nose: Nose normal.      Mouth/Throat:      Mouth: Mucous membranes are moist.      Dentition: Abnormal dentition (worn lower teeth, bite guard in place).      Pharynx: Oropharynx is clear. Uvula midline. No oropharyngeal exudate.   Eyes:      General: Lids are normal. No scleral icterus.     Extraocular Movements: Extraocular movements intact.      Conjunctiva/sclera: Conjunctivae normal.      Pupils: Pupils are equal, round, and reactive to light.   Neck:      Musculoskeletal: Full passive range of motion without pain, normal range of motion and neck supple.      Thyroid: No thyroid mass or thyromegaly.      Vascular: No carotid bruit.      Trachea: Trachea normal.   Cardiovascular:      Rate and Rhythm: Normal rate and regular rhythm.      Pulses:           Radial pulses are 2+ on the right side and 2+ on the left side.        Dorsalis pedis pulses are 2+ on the right side and 2+ on the left side.        Posterior tibial pulses are 2+ on the right side and 2+ on the left side.      Heart sounds: Normal heart sounds, S1 normal and S2 normal. No murmur. No friction rub. No gallop.    Pulmonary:      Effort: Pulmonary effort is normal. No respiratory distress.      Breath sounds: Normal breath sounds. No wheezing, rhonchi or rales.   Abdominal:      General: Bowel sounds are normal. There is no distension.      Palpations: Abdomen is soft. There is no mass.      Tenderness: There is no abdominal tenderness. There is no guarding or rebound.   Musculoskeletal: Normal range of motion.      Right hip: She exhibits bony tenderness (over  trochanteric bursa, mild). She exhibits normal range of motion, normal strength and no tenderness.      Left hip: She exhibits bony tenderness (over trochanteric bursa, mild). She exhibits normal range of motion, normal strength and no tenderness.      Right lower leg: No edema.      Left lower leg: No edema.      Right foot: Normal range of motion. No deformity or bunion.      Left foot: Normal range of motion. No deformity or bunion.   Feet:      Right foot:      Skin integrity: No ulcer, blister or skin breakdown.      Left foot:      Skin integrity: No ulcer, blister or skin breakdown.   Lymphadenopathy:      Cervical: No cervical adenopathy.      Right cervical: No superficial, deep or posterior cervical adenopathy.     Left cervical: No superficial, deep or posterior cervical adenopathy.      Upper Body:      Right upper body: No supraclavicular, axillary or pectoral adenopathy.      Left upper body: No supraclavicular, axillary or pectoral adenopathy.      Lower Body: No right inguinal adenopathy. No left inguinal adenopathy.   Skin:     General: Skin is warm and dry.      Findings: No rash.   Neurological:      Mental Status: She is alert and oriented to person, place, and time.      Cranial Nerves: No cranial nerve deficit.      Sensory: No sensory deficit.      Motor: Tremor (minimal, action, L hand outstretched) present. No weakness, atrophy or abnormal muscle tone.      Gait: Gait normal.      Deep Tendon Reflexes: Reflexes are normal and symmetric.      Reflex Scores:       Patellar reflexes are 2+ on the right side and 2+ on the left side.       Achilles reflexes are 2+ on the right side and 2+ on the left side.  Psychiatric:         Mood and Affect: Mood normal.         Behavior: Behavior normal.         Thought Content: Thought content normal.         Assessment/ Plan  Diagnoses and all orders for this visit:    Healthcare maintenance  -     Pneumococcal Polysaccharide Vaccine 23-Valent Greater Than  or Equal To 1yo Subcutaneous / IM    Dyslipidemia  -     psyllium (METAMUCIL) 58.6 % powder; Take  by mouth Daily.    Disc disorder of cervical region    Essential tremor    Gastroesophageal reflux disease without esophagitis    History of peripheral neuropathy    Primary osteoarthritis of both hands    Primary osteoarthritis of both knees    Subclinical hypothyroidism    Urge incontinence of urine    Trochanteric bursitis of both hips    Encounter for immunization   -     Pneumococcal Polysaccharide Vaccine 23-Valent Greater Than or Equal To 1yo Subcutaneous / IM        Return in about 1 year (around 12/30/2021) for Medicare Wellness, Annual physical.      Discussion:  Noemi Fowler is a 65 y.o. female RTC in yearly CPE/ AWV, review of medical issues:   1. Hx of epigastric pain in past s/p lap cristiano and appy and subsequent mild gastritis and Schatzki's ring/ spastic esophagus on EGD in 2017- sx overall controlled on omeprazole daily, no dysphagia or pain noted.  C/W PPI daily.  2. Overweight - struggling with exercise consistency, but has new bike at home. Will trend weight with diet mods and regular exercise.   3. Dyslipidemia - slight LDL progression, recalling statins deferred with low CR in 2016.  C/W improved diet and advised again to add CV exercise.  Start fiber OTC daily.   4. Essential Tremor - dx'd in 2015, after (-) DAMIAN scan, mild progression. Remains off gabapentin > 2 years, declines other meds. F/U neurology in 2021 as planned.  5. Neuropathy,  tingling on whole L side of body - s/p  extensive w/u with Neurology, unclear etiology.  Remains off Gabapentin for lack of benefit.  F/U neurology 3/2021.  6. Cervical spine DJD/ DDD s/p fusion at C5-C6 and s/p epidurals in past -  CHAR.    7. OA, in hands and knees - stiffness >> pain.  Progressive on glucosamine, but not limiting activity.  Monitor.   8. Urge Incontinence - persists.  Agrees to PT trial, working with sister who is PT. U/A clear.  Counseled on PT course and compliance today.    9. LFT elevation, nearly resolved - Suspect fatty liver, though not shown U/S 1/2020 (noted past hemangioma). C/W weight loss efforts and will proceed with serologic work-up if progressive.   10. Trochanteric bursitis B - s/p eval at ortho with injx, temporary response.  Consider PT vs. Repeat injx. Agrees to d/w sister who is PT.  11. HX of Subclinical hypothyroidism - TSH resolved today to normal, hold on additional work-up.   12. HM - labs d/w pt; Flu/ Tdap/ Hep A/ Zostavax/ Prevnar/ Shingrix - UTD; Pvax today; C-scope 12/2015 (-) -->C-scope 7/2020 (1 hyperplastic polyp); Pap / Mammo UTD 9/2020 with Gyn; DEXA normal 7/2016 per Gyn --> repeat at Gyn, pt to schedule; Hep C Ab (-) 2014; AAA (-) on 2015 CT A/P and U/S in 10/2019; add back exercise; Preventative care plan provided to pt at end of visit      RTC one year CPE/ AWV, F labs prior

## 2020-12-30 NOTE — PROGRESS NOTES
"Subjective   Noemi Fowler is a 65 y.o. female     Chief Complaint   Patient presents with   • Annual Exam     review of medical issues    • gerd   • Arthritis       HPI:  Noemi Fowler is a 65 y.o. female RTC in yearly CPE/ AWV, review of medical issues: Health has 'been fine'.   1. Hx of epigastric pain in past s/p lap cristiano and appy and subsequent mild gastritis and Schatzki's ring/ spastic esophagus on EGD in 2017 with return of same abdominal pain sx in 2019 - \"it is much, much, much better'.  Feels like omeprazole daily and 'no nearly as bas as it was\". Breakthrough sx are very infrequent. Diet is better, 'I am watching what I am etating\".  Swallowing Ok, no dysphagia noted.   2. Overweight -diet and exercise 'are going'. Very active watching [employer].  Has new bike and plans to ride indoors, stationary bike.  3. Essential Tremor - dx'd in 2015, after (-) DAMIAN scan, mild progression. Off gabapentin > 2 years, declines other meds. \"Still there.\">  Really only noticeable when picking up with L hand. 'It does not bother me, but it is very noticeable'. Sees neurology in 2021.  4. Cervical spine DJD/ DDD s/p fusion at C5-C6 and s/p epidurals in past -  CHAR. No pain flares over year.   5. OA, in hands and knees - stiffness >> pain.  \"It is getting worse'.  Still more stiff as issue.  Is still taking glucosamine.     6. Urge Incontinence - persists.  NEver was able to work with PT due to schedules, but is working with sister who is PT and has given her info and plan to work with.     Review of Systems   Constitution: Positive for weight loss ( a few pounds). Negative for chills, fever and weight gain.   HENT: Negative for congestion, hearing loss, odynophagia and sore throat.    Eyes: Negative for discharge, double vision, pain and redness.        Last eye exam 2020, no glasses   Cardiovascular: Negative for chest pain, dyspnea on exertion, irregular heartbeat, leg swelling, near-syncope, palpitations and " "syncope.   Respiratory: Negative for cough, shortness of breath, sleep disturbances due to breathing and snoring.    Endocrine: Negative for polydipsia, polyphagia and polyuria.   Hematologic/Lymphatic: Negative for bleeding problem. Does not bruise/bleed easily.   Skin: Negative for rash and suspicious lesions.   Musculoskeletal: Positive for arthritis, joint pain (R and L lateral hip, bursitis. Had responded to injx in past from ortho. ) and stiffness. Negative for joint swelling, muscle cramps, muscle weakness and myalgias.   Gastrointestinal: Positive for heartburn (rare breakthrough). Negative for constipation, diarrhea, dysphagia, nausea and vomiting.   Genitourinary: Positive for bladder incontinence (\"yes, but I am working on that one too\". Using PT maneuvers.  Urgency is issue. ) and urgency (\"I cant hold it in\". ). Negative for dysuria, frequency, hematuria, hesitancy and incomplete emptying.   Neurological: Negative for excessive daytime sleepiness, dizziness, headaches and light-headedness.   Psychiatric/Behavioral: Negative for depression. The patient does not have insomnia (\"fair\", bursitis will affect. ) and is not nervous/anxious.    Allergic/Immunologic: Negative for environmental allergies and persistent infections.     The following portions of the patient's history were reviewed and updated as appropriate: allergies, current medications, past medical history, past social history, past surgical history and problem list.      Patient Care Team:  Levon Barber MD as PCP - General  Levon Barber MD as PCP - Family Medicine    Recent Hospitalizations: No recent hospitalization(s).    Depression Screen:   PHQ-2/PHQ-9 Depression Screening 12/30/2020   Little interest or pleasure in doing things 0   Feeling down, depressed, or hopeless 0   Trouble falling or staying asleep, or sleeping too much -   Feeling tired or having little energy -   Poor appetite or overeating -   Feeling bad about yourself - " or that you are a failure or have let yourself or your family down -   Trouble concentrating on things, such as reading the newspaper or watching television -   Moving or speaking so slowly that other people could have noticed. Or the opposite - being so fidgety or restless that you have been moving around a lot more than usual -   Thoughts that you would be better off dead, or of hurting yourself in some way -   Total Score 0   If you checked off any problems, how difficult have these problems made it for you to do your work, take care of things at home, or get along with other people? -       Functional and Cognitive Screening:  Functional & Cognitive Status 12/27/2019   Do you have difficulty preparing food and eating? No   Do you have difficulty bathing yourself, getting dressed or grooming yourself? No   Do you have difficulty using the toilet? No   Do you have difficulty moving around from place to place? No   Do you have trouble with steps or getting out of a bed or a chair? No   Current Diet Unhealthy Diet   Dental Exam Up to date   Eye Exam Up to date   Exercise (times per week) 1 times per week   Current Exercise Activities Include Walking   Do you need help using the phone?  No   Are you deaf or do you have serious difficulty hearing?  No   Do you need help with transportation? No   Do you need help shopping? No   Do you need help preparing meals?  No   Do you need help with housework?  No   Do you need help with laundry? No   Do you need help taking your medications? No   Do you need help managing money? No   Do you ever drive or ride in a car without wearing a seat belt? No   Have you felt unusual stress, anger or loneliness in the last month? No   Who do you live with? Spouse   If you need help, do you have trouble finding someone available to you? No   Have you been bothered in the last four weeks by sexual problems? No   Do you have difficulty concentrating, remembering or making decisions? No  "      Does the patient have evidence of cognitive impairment? no    Does not need ASA (and currently is not on it)    Vitals:    12/30/20 0900   BP: 118/80   Pulse: 90   Resp: 12   Temp: 96.9 °F (36.1 °C)   SpO2: 98%   Weight: 81.6 kg (180 lb)   Height: 170.2 cm (67\")   PainSc: 0-No pain       Body mass index is 28.19 kg/m².    Visual Acuity:  No exam data present    Advanced Care Planning:  ACP discussion was held with the patient during this visit. Patient has an advance directive (not in EMR), copy requested.    Objective   Physical Exam   Physical Exam   Vitals signs reviewed.   Constitutional:   General: She is not in acute distress.  Appearance: Normal appearance. She is well-developed. She is not ill-appearing or toxic-appearing.   HENT:   Head: Normocephalic and atraumatic.   Right Ear: Hearing, tympanic membrane, ear canal and external ear normal.   Left Ear: Hearing, tympanic membrane, ear canal and external ear normal.   Nose: Nose normal.   Mouth/Throat:   Mouth: Mucous membranes are moist.   Dentition: Abnormal dentition (worn lower teeth, bite guard in place).   Pharynx: Oropharynx is clear. Uvula midline. No oropharyngeal exudate.   Eyes:   General: Lids are normal. No scleral icterus.  Extraocular Movements: Extraocular movements intact.   Conjunctiva/sclera: Conjunctivae normal.   Pupils: Pupils are equal, round, and reactive to light.   Neck:   Musculoskeletal: Full passive range of motion without pain, normal range of motion and neck supple.   Thyroid: No thyroid mass or thyromegaly.   Vascular: No carotid bruit.   Trachea: Trachea normal.   Cardiovascular:   Rate and Rhythm: Normal rate and regular rhythm.   Pulses:   Radial pulses are 2+ on the right side and 2+ on the left side.   Dorsalis pedis pulses are 2+ on the right side and 2+ on the left side.   Posterior tibial pulses are 2+ on the right side and 2+ on the left side.   Heart sounds: Normal heart sounds, S1 normal and S2 normal. No " murmur. No friction rub. No gallop.   Pulmonary:   Effort: Pulmonary effort is normal. No respiratory distress.   Breath sounds: Normal breath sounds. No wheezing, rhonchi or rales.   Abdominal:   General: Bowel sounds are normal. There is no distension.   Palpations: Abdomen is soft. There is no mass.   Tenderness: There is no abdominal tenderness. There is no guarding or rebound.   Musculoskeletal: Normal range of motion.   Right hip: She exhibits bony tenderness (over trochanteric bursa, mild). She exhibits normal range of motion, normal strength and no tenderness.   Left hip: She exhibits bony tenderness (over trochanteric bursa, mild). She exhibits normal range of motion, normal strength and no tenderness.   Right lower leg: No edema.   Left lower leg: No edema.   Right foot: Normal range of motion. No deformity or bunion.   Left foot: Normal range of motion. No deformity or bunion.   Feet:   Right foot:   Skin integrity: No ulcer, blister or skin breakdown.   Left foot:   Skin integrity: No ulcer, blister or skin breakdown.   Lymphadenopathy:   Cervical: No cervical adenopathy.   Right cervical: No superficial, deep or posterior cervical adenopathy.  Left cervical: No superficial, deep or posterior cervical adenopathy.   Upper Body:   Right upper body: No supraclavicular, axillary or pectoral adenopathy.   Left upper body: No supraclavicular, axillary or pectoral adenopathy.   Lower Body: No right inguinal adenopathy. No left inguinal adenopathy.   Skin:   General: Skin is warm and dry.   Findings: No rash.   Neurological:   Mental Status: She is alert and oriented to person, place, and time.   Cranial Nerves: No cranial nerve deficit.   Sensory: No sensory deficit.   Motor: Tremor (minimal, action, L hand outstretched) present. No weakness, atrophy or abnormal muscle tone.   Gait: Gait normal.   Deep Tendon Reflexes: Reflexes are normal and symmetric.   Reflex Scores:   Patellar reflexes are 2+ on the right  side and 2+ on the left side.   Achilles reflexes are 2+ on the right side and 2+ on the left side.  Psychiatric:   Mood and Affect: Mood normal.   Behavior: Behavior normal.   Thought Content: Thought content normal.       Compared to one year ago, the patient feels physical health is the same.  Compared to one year ago, the patient feels mental health is the same.    Reviewed chart for potential of high risk medication in the elderly: yes  Reviewed chart for potential of harmful drug interactions in the elderly:yes    Identification of Risk Factors:  Risk factors include: Abdominal Aortic Aneurysm Screening  Advance Directive Discussion  Obesity/Overweight .    Patient Self-Management and Personalized Health Advice  The patient has been provided with information about: diet, exercise and weight management and preventive services including:   · Annual Wellness Visit (AWV)  · Bone Density Measurements.    Discussed the patient's BMI with her. The BMI is above average; BMI management plan is completed.    Assessment/Plan   Diagnoses and all orders for this visit:    1. Healthcare maintenance (Primary)  -     Pneumococcal Polysaccharide Vaccine 23-Valent Greater Than or Equal To 3yo Subcutaneous / IM    2. Dyslipidemia  -     psyllium (METAMUCIL) 58.6 % powder; Take  by mouth Daily.; Refill: 12    3. Disc disorder of cervical region    4. Essential tremor    5. Gastroesophageal reflux disease without esophagitis    6. History of peripheral neuropathy    7. Primary osteoarthritis of both hands    8. Primary osteoarthritis of both knees    9. Subclinical hypothyroidism    10. Urge incontinence of urine    11. Trochanteric bursitis of both hips    12. Encounter for immunization   -     Pneumococcal Polysaccharide Vaccine 23-Valent Greater Than or Equal To 3yo Subcutaneous / IM            Diagnoses and all orders for this visit:    Healthcare maintenance  -     Pneumococcal Polysaccharide Vaccine 23-Valent Greater Than or  Equal To 1yo Subcutaneous / IM    Dyslipidemia  -     psyllium (METAMUCIL) 58.6 % powder; Take  by mouth Daily.    Disc disorder of cervical region    Essential tremor    Gastroesophageal reflux disease without esophagitis    History of peripheral neuropathy    Primary osteoarthritis of both hands    Primary osteoarthritis of both knees    Subclinical hypothyroidism    Urge incontinence of urine    Trochanteric bursitis of both hips    Encounter for immunization   -     Pneumococcal Polysaccharide Vaccine 23-Valent Greater Than or Equal To 1yo Subcutaneous / IM      Noemi Fowler is a 65 y.o. female RTC in yearly CPE/ AWV, review of medical issues:   1. Hx of epigastric pain in past s/p lap cristiano and appy and subsequent mild gastritis and Schatzki's ring/ spastic esophagus on EGD in 2017- sx overall controlled on omeprazole daily, no dysphagia or pain noted. C/W PPI daily.   2. Overweight - struggling with exercise consistency, but has new bike at home. Will trend weight with diet mods and regular exercise.   3. Dyslipidemia - slight LDL progression, recalling statins deferred with low CR in 2016. C/W improved diet and advised again to add CV exercise. Start fiber OTC daily.   4. Essential Tremor - dx'd in 2015, after (-) DAMIAN scan, mild progression. Remains off gabapentin > 2 years, declines other meds. F/U neurology in 2021 as planned.   5. Neuropathy, tingling on whole L side of body - s/p extensive w/u with Neurology, unclear etiology. Remains off Gabapentin for lack of benefit. F/U neurology 3/2021.   6. Cervical spine DJD/ DDD s/p fusion at C5-C6 and s/p epidurals in past - CHAR.   7. OA, in hands and knees - stiffness >> pain. Progressive on glucosamine, but not limiting activity. Monitor.   8. Urge Incontinence - persists. Agrees to PT trial, working with sister who is PT. U/A clear. Counseled on PT course and compliance today.   9. LFT elevation, nearly resolved - Suspect fatty liver, though not shown  U/S 1/2020 (noted past hemangioma). C/W weight loss efforts and will proceed with serologic work-up if progressive.   10. Trochanteric bursitis B - s/p eval at ortho with injx, temporary response. Consider PT vs. Repeat injx. Agrees to d/w sister who is PT.   11. HX of Subclinical hypothyroidism - TSH resolved today to normal, hold on additional work-up.   12. HM - labs d/w pt; Flu/ Tdap/ Hep A/ Zostavax/ Prevnar/ Shingrix - UTD; Pvax today; C-scope 12/2015 (-) -->C-scope 7/2020 (1 hyperplastic polyp); Pap / Mammo UTD 9/2020 with Gyn; DEXA normal 7/2016 per Gyn --> repeat at Gyn, pt to schedule; Hep C Ab (-) 2014; AAA (-) on 2015 CT A/P and U/S in 10/2019; add back exercise; Preventative care plan provided to pt at end of visit    Return in about 1 year (around 12/30/2021) for Medicare Wellness, Annual physical.          Current Outpatient Medications:   •  acetaminophen (TYLENOL) 500 MG tablet, 2 tabs PO Q 8 hours PRN, Disp: 30 tablet, Rfl: 0  •  Calcium Carb-Cholecalciferol (CALCIUM + D3) 600-200 MG-UNIT tablet, Take  by mouth., Disp: , Rfl:   •  Cholecalciferol (VITAMIN D3 PO), Take 2,000 Units by mouth Daily., Disp: , Rfl:   •  Coenzyme Q10 (COQ10) 200 MG capsule, Take  by mouth., Disp: , Rfl:   •  Magnesium 500 MG capsule, Take  by mouth Daily., Disp: , Rfl:   •  Misc Natural Products (GLUCOS-CHONDROIT-MSM COMPLEX) tablet, Take  by mouth., Disp: , Rfl:   •  Omega-3 Fatty Acids (FISH OIL) 1000 MG capsule capsule, Take 2 capsules by mouth 2 (Two) Times a Day With Meals., Disp: , Rfl:   •  omeprazole (priLOSEC) 40 MG capsule, TAKE 1 CAPSULE BY MOUTH EVERY DAY, Disp: 30 capsule, Rfl: 3  •  psyllium (METAMUCIL) 58.6 % powder, Take  by mouth Daily., Disp: , Rfl: 12

## 2021-01-18 DIAGNOSIS — K22.4 ESOPHAGEAL DYSMOTILITY: ICD-10-CM

## 2021-01-18 DIAGNOSIS — K22.2 SCHATZKI'S RING: ICD-10-CM

## 2021-01-18 DIAGNOSIS — K21.9 GASTROESOPHAGEAL REFLUX DISEASE WITHOUT ESOPHAGITIS: ICD-10-CM

## 2021-01-18 RX ORDER — OMEPRAZOLE 40 MG/1
CAPSULE, DELAYED RELEASE ORAL
Qty: 30 CAPSULE | Refills: 11 | Status: SHIPPED | OUTPATIENT
Start: 2021-01-18 | End: 2021-11-15

## 2021-03-19 ENCOUNTER — BULK ORDERING (OUTPATIENT)
Dept: CASE MANAGEMENT | Facility: OTHER | Age: 66
End: 2021-03-19

## 2021-03-19 DIAGNOSIS — Z23 IMMUNIZATION DUE: ICD-10-CM

## 2021-10-05 ENCOUNTER — OFFICE VISIT (OUTPATIENT)
Dept: INTERNAL MEDICINE | Facility: CLINIC | Age: 66
End: 2021-10-05

## 2021-10-05 VITALS
BODY MASS INDEX: 29.19 KG/M2 | HEIGHT: 67 IN | OXYGEN SATURATION: 98 % | DIASTOLIC BLOOD PRESSURE: 80 MMHG | HEART RATE: 91 BPM | TEMPERATURE: 96.9 F | SYSTOLIC BLOOD PRESSURE: 110 MMHG | WEIGHT: 186 LBS

## 2021-10-05 DIAGNOSIS — R11.0 NAUSEA: ICD-10-CM

## 2021-10-05 DIAGNOSIS — R19.4 BOWEL HABIT CHANGES: ICD-10-CM

## 2021-10-05 DIAGNOSIS — Z90.49 S/P LAPAROSCOPIC CHOLECYSTECTOMY: ICD-10-CM

## 2021-10-05 DIAGNOSIS — R14.0 ABDOMINAL BLOATING: ICD-10-CM

## 2021-10-05 DIAGNOSIS — Z90.49 S/P APPENDECTOMY: ICD-10-CM

## 2021-10-05 DIAGNOSIS — R10.13 ABDOMINAL PAIN, EPIGASTRIC: Primary | ICD-10-CM

## 2021-10-05 LAB
HCT VFR BLDA CALC: 38.1 % (ref 38–51)
HGB BLDA-MCNC: 12.4 G/DL (ref 12–17)
LYMPHOCYTES # BLD: 30.5 %
MCH, POC: 26.2
MCHC, POC: 32.5
MCV, POC: 80.4
MONOCYTES # BLD: 10 %
PLATELET # BLD: 186 10*3/MM3
PMV BLD: 8.4 FL
POC NEUTROPHIL: 59.5 %
RBC, POC: 4.74
RDW, POC: 45.4
WBC # BLD: 6.8 10*3/UL

## 2021-10-05 PROCEDURE — 99214 OFFICE O/P EST MOD 30 MIN: CPT | Performed by: INTERNAL MEDICINE

## 2021-10-05 PROCEDURE — 85025 COMPLETE CBC W/AUTO DIFF WBC: CPT | Performed by: INTERNAL MEDICINE

## 2021-10-05 RX ORDER — DICYCLOMINE HYDROCHLORIDE 10 MG/1
10 CAPSULE ORAL 4 TIMES DAILY PRN
Qty: 30 CAPSULE | Refills: 1 | Status: SHIPPED | OUTPATIENT
Start: 2021-10-05 | End: 2021-12-08

## 2021-10-05 NOTE — PROGRESS NOTES
"Abdominal Pain (loose stool, bloating, after eating must all day, tightness)      HPI  Noemi Fowler is a 66 y.o. female RTC In acute care:  \"I dont feel good\".  Feels like is gaining weight when should not be.  Is walking more than in past.  Feel bloated across upper abdomen.  After eating feels full and bloated.  Is able to finish meal, but some times will eat less due to feeling.  \"Almost like I and going to throw up, but I haven't'. Is eating three meals daily. Bowels are 'getting looser' but only describes as 'soft'.  No watery stools. NO blood in stool.  Sx noted in last several weeks.   No diet changed.  No new supplements, but is on Fish Oil (2000mg daily).  Has been taking for a 'long time'.  Did not really increase dose after last visit and misread label.     Feels like has some stress going on with adult son.  Is seeing therapist and trying to address those issues.        Review of Systems   Constitutional: Positive for decreased appetite (variable) and weight gain. Negative for chills, fever and malaise/fatigue.   HENT: Negative for odynophagia.    Cardiovascular: Negative for near-syncope and syncope.   Skin: Negative for rash and suspicious lesions.   Gastrointestinal: Positive for bloating, abdominal pain (rare sharp pain in mid abdomen, wandering.  Feels like 'knife sticks you'. Lasts seconds only. ), change in bowel habit and nausea. Negative for constipation, diarrhea, dysphagia, heartburn and vomiting.   Genitourinary: Negative for dysuria, frequency and hematuria.   Neurological: Negative for dizziness and light-headedness.   Psychiatric/Behavioral: Positive for depression (\"some emotional depression\" ). Negative for altered mental status. The patient is not nervous/anxious.        The following portions of the patient's history were reviewed and updated as appropriate: allergies, current medications, past medical history, past social history and problem list.      Current Outpatient " "Medications:   •  Calcium Carb-Cholecalciferol (CALCIUM + D3) 600-200 MG-UNIT tablet, Take  by mouth., Disp: , Rfl:   •  Cholecalciferol (VITAMIN D3 PO), Take 2,000 Units by mouth Daily., Disp: , Rfl:   •  Coenzyme Q10 (COQ10) 200 MG capsule, Take  by mouth., Disp: , Rfl:   •  Magnesium 500 MG capsule, Take  by mouth Daily., Disp: , Rfl:   •  Misc Natural Products (GLUCOS-CHONDROIT-MSM COMPLEX) tablet, Take  by mouth., Disp: , Rfl:   •  Omega-3 Fatty Acids (FISH OIL) 1000 MG capsule capsule, Take 2 capsules by mouth 2 (Two) Times a Day With Meals., Disp: , Rfl:   •  omeprazole (priLOSEC) 40 MG capsule, TAKE 1 CAPSULE BY MOUTH EVERY DAY, Disp: 30 capsule, Rfl: 11  •  acetaminophen (TYLENOL) 500 MG tablet, 2 tabs PO Q 8 hours PRN, Disp: 30 tablet, Rfl: 0  •  dicyclomine (Bentyl) 10 MG capsule, Take 1 capsule by mouth 4 (Four) Times a Day As Needed (fullness, bloating, pain)., Disp: 30 capsule, Rfl: 1    Vitals:    10/05/21 1418   BP: 110/80   Pulse: 91   Temp: 96.9 °F (36.1 °C)   SpO2: 98%   Weight: 84.4 kg (186 lb)   Height: 170.2 cm (67\")     Body mass index is 29.13 kg/m².      Physical Exam  Constitutional:       General: She is not in acute distress.     Appearance: Normal appearance. She is normal weight. She is not ill-appearing or toxic-appearing.   HENT:      Head: Normocephalic and atraumatic.      Mouth/Throat:      Mouth: Mucous membranes are moist.      Pharynx: Oropharynx is clear. No oropharyngeal exudate.   Eyes:      General: No scleral icterus.     Conjunctiva/sclera: Conjunctivae normal.      Pupils: Pupils are equal, round, and reactive to light.   Neck:      Vascular: No carotid bruit.   Cardiovascular:      Rate and Rhythm: Normal rate and regular rhythm.      Heart sounds: No murmur heard.   No friction rub. No gallop.    Pulmonary:      Effort: Pulmonary effort is normal. No respiratory distress.      Breath sounds: No decreased breath sounds, wheezing, rhonchi or rales.   Abdominal:      " General: Abdomen is flat. Bowel sounds are normal. There is no distension.      Palpations: There is no hepatomegaly, splenomegaly or mass.      Tenderness: There is abdominal tenderness (mild) in the epigastric area and periumbilical area. There is no right CVA tenderness, left CVA tenderness, guarding or rebound. Negative signs include Villaseñor's sign.   Musculoskeletal:      Cervical back: Normal range of motion and neck supple. No tenderness.   Lymphadenopathy:      Cervical: No cervical adenopathy.   Neurological:      General: No focal deficit present.      Mental Status: She is alert.      Cranial Nerves: No cranial nerve deficit.      Gait: Gait normal.   Psychiatric:         Mood and Affect: Mood normal.         Behavior: Behavior normal.         Thought Content: Thought content normal.       Results for orders placed or performed in visit on 10/05/21   POC CBC With / Auto Diff    Specimen: Blood   Result Value Ref Range    WBC 6.8     RBC 4.74     Hemoglobin 12.4 12.0 - 17.0 g/dL    Hematocrit 38.1 38 - 51 %    MCV 80.4     MCH 26.2     MCHC 32.5     RDW-CV 45.4     MPV 8.4     Platelets 186 10*3/mm3    Neutrophil Rel % 59.5 %    Monocyte Rel % 10.0 %    Lymphocyte Rel % 30.5 %         Assessment/ Plan  Diagnoses and all orders for this visit:    Abdominal pain, epigastric  -     POC CBC With / Auto Diff  -     Amylase  -     Comprehensive Metabolic Panel  -     TSH  -     UA / M With / Rflx Culture([facility] ONLY) - Urine, Clean Catch  -     Lipase  -     H. Pylori Antigen, Stool - Stool, Per Rectum  -     Sedimentation Rate  -     C-reactive Protein  -     US Abdomen Complete; Future  -     dicyclomine (Bentyl) 10 MG capsule; Take 1 capsule by mouth 4 (Four) Times a Day As Needed (fullness, bloating, pain).    Bowel habit changes  -     POC CBC With / Auto Diff  -     Amylase  -     Comprehensive Metabolic Panel  -     TSH  -     UA / M With / Rflx Culture([facility] ONLY) - Urine, Clean Catch  -     Lipase  -      H. Pylori Antigen, Stool - Stool, Per Rectum  -     Sedimentation Rate  -     C-reactive Protein  -     US Abdomen Complete; Future    Abdominal bloating  -     POC CBC With / Auto Diff  -     Amylase  -     Comprehensive Metabolic Panel  -     TSH  -     UA / M With / Rflx Culture([facility] ONLY) - Urine, Clean Catch  -     Lipase  -     H. Pylori Antigen, Stool - Stool, Per Rectum  -     Sedimentation Rate  -     C-reactive Protein  -     US Abdomen Complete; Future  -     dicyclomine (Bentyl) 10 MG capsule; Take 1 capsule by mouth 4 (Four) Times a Day As Needed (fullness, bloating, pain).    S/P laparoscopic cholecystectomy    S/P appendectomy    Nausea  -     US Abdomen Complete; Future  -     dicyclomine (Bentyl) 10 MG capsule; Take 1 capsule by mouth 4 (Four) Times a Day As Needed (fullness, bloating, pain).        Return for Next scheduled follow up.      Discussion:  Noemi Fowler is a 66 y.o. female RTC In acute care (new issue to examiner) with weeks of abdominal bloating/ fullness, nausea and unexpected weight gain despite eating less/ missing some meals due to sx.  Exam notable for mild epigastric tenderness, otherwise benign.  Note made of pt past lap cristiano and appy and mild gastritis and Schatzki's ring/ spastic esophagus on EGD in 2017.  Will proceed with labs as noted above and get US abdomen to assess. Will have pt trial antispasmodic to see if helps, noting some association of sx with emotional distess/ stress for which she is seeking counseling. C/W PPI as she is. eep close eye on vague element of early satiety as may need endoscopic eval if sx not better/ persist.    F/U after above work-up.

## 2021-10-06 DIAGNOSIS — R19.4 BOWEL HABIT CHANGES: ICD-10-CM

## 2021-10-06 DIAGNOSIS — R10.13 ABDOMINAL PAIN, EPIGASTRIC: ICD-10-CM

## 2021-10-06 DIAGNOSIS — R14.0 ABDOMINAL BLOATING: Primary | ICD-10-CM

## 2021-10-06 LAB
ALBUMIN SERPL-MCNC: 4.7 G/DL (ref 3.5–5.2)
ALBUMIN/GLOB SERPL: 2 G/DL
ALP SERPL-CCNC: 98 U/L (ref 39–117)
ALT SERPL-CCNC: 33 U/L (ref 1–33)
AMYLASE SERPL-CCNC: 77 U/L (ref 28–100)
APPEARANCE UR: CLEAR
AST SERPL-CCNC: 27 U/L (ref 1–32)
BACTERIA #/AREA URNS HPF: NORMAL /HPF
BILIRUB SERPL-MCNC: 0.5 MG/DL (ref 0–1.2)
BILIRUB UR QL STRIP: NEGATIVE
BUN SERPL-MCNC: 14 MG/DL (ref 8–23)
BUN/CREAT SERPL: 17.5 (ref 7–25)
CALCIUM SERPL-MCNC: 9.7 MG/DL (ref 8.6–10.5)
CASTS URNS QL MICRO: NORMAL /LPF
CHLORIDE SERPL-SCNC: 101 MMOL/L (ref 98–107)
CO2 SERPL-SCNC: 27 MMOL/L (ref 22–29)
COLOR UR: YELLOW
CREAT SERPL-MCNC: 0.8 MG/DL (ref 0.57–1)
CRP SERPL-MCNC: <0.3 MG/DL (ref 0–0.5)
EPI CELLS #/AREA URNS HPF: NORMAL /HPF (ref 0–10)
ERYTHROCYTE [SEDIMENTATION RATE] IN BLOOD BY WESTERGREN METHOD: 7 MM/HR (ref 0–30)
GLOBULIN SER CALC-MCNC: 2.3 GM/DL
GLUCOSE SERPL-MCNC: 109 MG/DL (ref 65–99)
GLUCOSE UR QL: NEGATIVE
HGB UR QL STRIP: NEGATIVE
KETONES UR QL STRIP: NEGATIVE
LEUKOCYTE ESTERASE UR QL STRIP: NEGATIVE
LIPASE SERPL-CCNC: 41 U/L (ref 13–60)
MICRO URNS: NORMAL
MICRO URNS: NORMAL
NITRITE UR QL STRIP: NEGATIVE
PH UR STRIP: 5.5 [PH] (ref 5–7.5)
POTASSIUM SERPL-SCNC: 4 MMOL/L (ref 3.5–5.2)
PROT SERPL-MCNC: 7 G/DL (ref 6–8.5)
PROT UR QL STRIP: NEGATIVE
RBC #/AREA URNS HPF: NORMAL /HPF (ref 0–2)
SODIUM SERPL-SCNC: 140 MMOL/L (ref 136–145)
SP GR UR: 1.01 (ref 1–1.03)
TSH SERPL DL<=0.005 MIU/L-ACNC: 1.5 UIU/ML (ref 0.27–4.2)
URINALYSIS REFLEX: NORMAL
UROBILINOGEN UR STRIP-MCNC: 0.2 MG/DL (ref 0.2–1)
WBC #/AREA URNS HPF: NORMAL /HPF (ref 0–5)

## 2021-10-08 LAB — H PYLORI AG STL QL IA: NEGATIVE

## 2021-10-21 ENCOUNTER — HOSPITAL ENCOUNTER (OUTPATIENT)
Dept: ULTRASOUND IMAGING | Facility: HOSPITAL | Age: 66
Discharge: HOME | End: 2021-10-21
Admitting: INTERNAL MEDICINE

## 2021-10-21 DIAGNOSIS — R14.0 ABDOMINAL BLOATING: ICD-10-CM

## 2021-10-21 DIAGNOSIS — R19.4 BOWEL HABIT CHANGES: ICD-10-CM

## 2021-10-21 DIAGNOSIS — R10.13 ABDOMINAL PAIN, EPIGASTRIC: ICD-10-CM

## 2021-10-21 DIAGNOSIS — R11.0 NAUSEA: ICD-10-CM

## 2021-10-21 PROCEDURE — 76700 US EXAM ABDOM COMPLETE: CPT

## 2021-10-26 ENCOUNTER — HOSPITAL ENCOUNTER (OUTPATIENT)
Dept: ULTRASOUND IMAGING | Facility: HOSPITAL | Age: 66
End: 2021-10-26

## 2021-10-26 DIAGNOSIS — R68.81 EARLY SATIETY: ICD-10-CM

## 2021-10-26 DIAGNOSIS — R10.13 DYSPEPSIA: ICD-10-CM

## 2021-10-26 DIAGNOSIS — K21.9 GASTROESOPHAGEAL REFLUX DISEASE WITHOUT ESOPHAGITIS: Primary | ICD-10-CM

## 2021-10-29 ENCOUNTER — TELEPHONE (OUTPATIENT)
Dept: INTERNAL MEDICINE | Facility: CLINIC | Age: 66
End: 2021-10-29

## 2021-10-29 NOTE — TELEPHONE ENCOUNTER
CALLED DR AYALA'S OFFICE AND CAN'T GET IN UNTIL January, IS IT OKAY THAT SHE SEE A NURSE PRACTIONER AT HIS OFFICE OR SHOULD I WAIT UNTIL JANUARY     PLEASE ADVISE  [phone]

## 2021-11-02 ENCOUNTER — OFFICE (OUTPATIENT)
Dept: URBAN - METROPOLITAN AREA CLINIC 66 | Facility: CLINIC | Age: 66
End: 2021-11-02

## 2021-11-02 VITALS
SYSTOLIC BLOOD PRESSURE: 143 MMHG | HEART RATE: 67 BPM | DIASTOLIC BLOOD PRESSURE: 75 MMHG | HEIGHT: 67 IN | WEIGHT: 186 LBS

## 2021-11-02 DIAGNOSIS — R11.0 NAUSEA: ICD-10-CM

## 2021-11-02 DIAGNOSIS — R14.0 ABDOMINAL DISTENSION (GASEOUS): ICD-10-CM

## 2021-11-02 DIAGNOSIS — R10.9 UNSPECIFIED ABDOMINAL PAIN: ICD-10-CM

## 2021-11-02 DIAGNOSIS — K21.9 GASTRO-ESOPHAGEAL REFLUX DISEASE WITHOUT ESOPHAGITIS: ICD-10-CM

## 2021-11-02 PROCEDURE — 99214 OFFICE O/P EST MOD 30 MIN: CPT | Performed by: NURSE PRACTITIONER

## 2021-11-02 RX ORDER — FAMOTIDINE 40 MG/1
40 TABLET, FILM COATED ORAL
Qty: 30 | Refills: 11 | Status: ACTIVE
Start: 2021-11-02

## 2021-11-02 RX ORDER — CIPROFLOXACIN 500 MG/1
1000 TABLET, FILM COATED ORAL
Qty: 20 | Refills: 0 | Status: ACTIVE
Start: 2021-11-02

## 2021-11-14 DIAGNOSIS — K22.2 SCHATZKI'S RING: ICD-10-CM

## 2021-11-14 DIAGNOSIS — K22.4 ESOPHAGEAL DYSMOTILITY: ICD-10-CM

## 2021-11-14 DIAGNOSIS — K21.9 GASTROESOPHAGEAL REFLUX DISEASE WITHOUT ESOPHAGITIS: ICD-10-CM

## 2021-11-15 RX ORDER — OMEPRAZOLE 40 MG/1
CAPSULE, DELAYED RELEASE ORAL
Qty: 90 CAPSULE | Refills: 3 | Status: SHIPPED | OUTPATIENT
Start: 2021-11-15 | End: 2022-05-03 | Stop reason: SDUPTHER

## 2021-11-16 ENCOUNTER — APPOINTMENT (OUTPATIENT)
Dept: WOMENS IMAGING | Facility: HOSPITAL | Age: 66
End: 2021-11-16

## 2021-11-16 PROCEDURE — 77067 SCR MAMMO BI INCL CAD: CPT | Performed by: RADIOLOGY

## 2021-11-16 PROCEDURE — 77063 BREAST TOMOSYNTHESIS BI: CPT | Performed by: RADIOLOGY

## 2021-12-08 ENCOUNTER — TELEPHONE (OUTPATIENT)
Dept: INTERNAL MEDICINE | Facility: CLINIC | Age: 66
End: 2021-12-08

## 2021-12-08 ENCOUNTER — OFFICE VISIT (OUTPATIENT)
Dept: INTERNAL MEDICINE | Facility: CLINIC | Age: 66
End: 2021-12-08

## 2021-12-08 VITALS
WEIGHT: 188 LBS | SYSTOLIC BLOOD PRESSURE: 138 MMHG | HEIGHT: 67 IN | DIASTOLIC BLOOD PRESSURE: 72 MMHG | HEART RATE: 68 BPM | OXYGEN SATURATION: 98 % | BODY MASS INDEX: 29.51 KG/M2

## 2021-12-08 DIAGNOSIS — R42 VERTIGO: Primary | ICD-10-CM

## 2021-12-08 DIAGNOSIS — R51.9 NONINTRACTABLE HEADACHE, UNSPECIFIED CHRONICITY PATTERN, UNSPECIFIED HEADACHE TYPE: ICD-10-CM

## 2021-12-08 PROCEDURE — 93000 ELECTROCARDIOGRAM COMPLETE: CPT | Performed by: INTERNAL MEDICINE

## 2021-12-08 PROCEDURE — 99213 OFFICE O/P EST LOW 20 MIN: CPT | Performed by: INTERNAL MEDICINE

## 2021-12-08 NOTE — PROGRESS NOTES
Subjective     Noemi Fowler is a 66 y.o. female who presents with   Chief Complaint   Patient presents with   • Headache   • Dizziness       History of Present Illness     Abrupt onset on dizziness and head spinning.  She was sitting in a chair.  She felt intense spinning with she stood but.  No nausea.  She sat back down.  She took a baby aspirin.  Check checked her BP.  Neck felt stiff.  She feels head pressure.  Vertigo lasted twenty minutes.  No further episodes.  She does not feel dizzy today.  Head just feel heavy.  No new trouble walking, speaking.  No vision changes.  No syncope.  She was presyncopal.        Review of Systems   Respiratory: Negative.  Negative for chest tightness and shortness of breath.    Cardiovascular: Negative.  Negative for palpitations.   Neurological: Positive for dizziness and headaches. Negative for syncope and weakness.       The following portions of the patient's history were reviewed and updated as appropriate: allergies, current medications and problem list.    Patient Active Problem List    Diagnosis Date Noted   • Trochanteric bursitis of both hips 12/30/2020     Note Last Updated: 12/30/2020     S/p injx in past, temporary relief     • Esophageal dysmotility 12/18/2017     Note Last Updated: 12/26/2018     dx'd 1/2017, noted on EGD; sees GI, on TCA; resolved in 2018 and off meds.     • Gastroesophageal reflux disease without esophagitis 04/21/2017     Note Last Updated: 12/18/2017     With noted Schatzki ring and esophageal spasm in 1/2017 EGD; no esophagitis     • Nocturnal muscle cramps 04/21/2017   • Dyslipidemia 12/13/2016   • History of peripheral neuropathy      Note Last Updated: 12/13/2016     sees Dr. Mantilla sx only on L side     • Osteoarthritis of knee 10/17/2016   • Essential tremor 10/17/2016     Note Last Updated: 10/17/2016     dx'd with neurology in 2016       • Osteoarthritis of hand 10/11/2016   • Urge incontinence of urine 10/11/2016   • Disc  "disorder of cervical region 12/05/2013       Current Outpatient Medications on File Prior to Visit   Medication Sig Dispense Refill   • Calcium Carb-Cholecalciferol (CALCIUM + D3) 600-200 MG-UNIT tablet Take  by mouth.     • Cholecalciferol (VITAMIN D3 PO) Take 2,000 Units by mouth Daily.     • Coenzyme Q10 (COQ10) 200 MG capsule Take  by mouth.     • Magnesium 500 MG capsule Take  by mouth Daily.     • Misc Natural Products (GLUCOS-CHONDROIT-MSM COMPLEX) tablet Take  by mouth.     • Omega-3 Fatty Acids (FISH OIL) 1000 MG capsule capsule Take 2 capsules by mouth 2 (Two) Times a Day With Meals.     • omeprazole (priLOSEC) 40 MG capsule TAKE 1 CAPSULE BY MOUTH EVERY DAY 90 capsule 3   • [DISCONTINUED] acetaminophen (TYLENOL) 500 MG tablet 2 tabs PO Q 8 hours PRN 30 tablet 0   • [DISCONTINUED] dicyclomine (Bentyl) 10 MG capsule Take 1 capsule by mouth 4 (Four) Times a Day As Needed (fullness, bloating, pain). 30 capsule 1     No current facility-administered medications on file prior to visit.       Objective     /72   Pulse 68   Ht 170.2 cm (67.01\")   Wt 85.3 kg (188 lb)   SpO2 98%   BMI 29.44 kg/m²     Physical Exam  Constitutional:       Appearance: She is well-developed.   HENT:      Head: Normocephalic and atraumatic.      Right Ear: Hearing, tympanic membrane and external ear normal.      Left Ear: Hearing, tympanic membrane and external ear normal.      Nose: Nose normal.      Mouth/Throat:      Pharynx: No oropharyngeal exudate or posterior oropharyngeal erythema.   Neck:      Thyroid: No thyromegaly.   Cardiovascular:      Rate and Rhythm: Normal rate and regular rhythm.      Heart sounds: Normal heart sounds. No murmur heard.      Pulmonary:      Effort: Pulmonary effort is normal.      Breath sounds: Normal breath sounds.   Chest:   Breasts:      Right: No mass.      Left: No mass.       Abdominal:      General: There is no distension.      Palpations: Abdomen is soft.      Tenderness: There is no " abdominal tenderness.   Musculoskeletal:      Cervical back: Neck supple.   Lymphadenopathy:      Cervical: No cervical adenopathy.   Skin:     General: Skin is warm and dry.   Neurological:      Mental Status: She is alert and oriented to person, place, and time.      Sensory: Sensation is intact.      Motor: Motor function is intact.      Coordination: Coordination is intact.      Gait: Gait is intact.      Deep Tendon Reflexes:      Reflex Scores:       Bicep reflexes are 2+ on the right side and 2+ on the left side.       Brachioradialis reflexes are 2+ on the right side and 2+ on the left side.       Patellar reflexes are 2+ on the right side and 2+ on the left side.       Achilles reflexes are 2+ on the right side and 2+ on the left side.  Psychiatric:         Speech: Speech normal.         Behavior: Behavior normal.         Thought Content: Thought content normal.         Judgment: Judgment normal.       ECG 12 Lead    Date/Time: 12/8/2021 12:47 PM  Performed by: Lily Lennon MD  Authorized by: Lily Lennon MD   Comparison: not compared with previous ECG   Previous ECG: no previous ECG available  Rhythm: sinus rhythm  Rate: normal  Conduction: conduction normal  ST Segments: ST segments normal  T Waves: T waves normal  QRS axis: normal  Other: no other findings    Clinical impression: normal ECG              Assessment/Plan   Diagnoses and all orders for this visit:    1. Vertigo (Primary)  -     CBC & Differential  -     Comprehensive Metabolic Panel  -     MRI Brain With & Without Contrast; Future    2. Nonintractable headache, unspecified chronicity pattern, unspecified headache type    Other orders  -     ECG 12 Lead        Discussion    Patient presents with abrupt onset of severe vertigo at rest yesterday evening.  Neurological exam is intact.  I am concerned given her description about cerebellar CVA.  Check labs today.  STAT MRI of the brain is ordered to further evaluate.         Future  Appointments   Date Time Provider Department Center   12/28/2021  8:10 AM [facility]   1/4/2022 10:00 AM Levon Barber MD [facility]

## 2021-12-09 DIAGNOSIS — R42 VERTIGO: ICD-10-CM

## 2021-12-09 LAB
ALBUMIN SERPL-MCNC: 4.6 G/DL (ref 3.8–4.8)
ALBUMIN/GLOB SERPL: 1.8 {RATIO} (ref 1.2–2.2)
ALP SERPL-CCNC: 93 IU/L (ref 44–121)
ALT SERPL-CCNC: 25 IU/L (ref 0–32)
AST SERPL-CCNC: 23 IU/L (ref 0–40)
BASOPHILS # BLD AUTO: 0 X10E3/UL (ref 0–0.2)
BASOPHILS NFR BLD AUTO: 0 %
BILIRUB SERPL-MCNC: 0.5 MG/DL (ref 0–1.2)
BUN SERPL-MCNC: 18 MG/DL (ref 8–27)
BUN/CREAT SERPL: 21 (ref 12–28)
CALCIUM SERPL-MCNC: 9.5 MG/DL (ref 8.7–10.3)
CHLORIDE SERPL-SCNC: 105 MMOL/L (ref 96–106)
CO2 SERPL-SCNC: 25 MMOL/L (ref 20–29)
CREAT SERPL-MCNC: 0.86 MG/DL (ref 0.57–1)
EOSINOPHIL # BLD AUTO: 0 X10E3/UL (ref 0–0.4)
EOSINOPHIL NFR BLD AUTO: 1 %
ERYTHROCYTE [DISTWIDTH] IN BLOOD BY AUTOMATED COUNT: 14.9 % (ref 11.7–15.4)
GLOBULIN SER CALC-MCNC: 2.6 G/DL (ref 1.5–4.5)
GLUCOSE SERPL-MCNC: 96 MG/DL (ref 65–99)
HCT VFR BLD AUTO: 39.7 % (ref 34–46.6)
HGB BLD-MCNC: 12.6 G/DL (ref 11.1–15.9)
IMM GRANULOCYTES # BLD AUTO: 0 X10E3/UL (ref 0–0.1)
IMM GRANULOCYTES NFR BLD AUTO: 0 %
LYMPHOCYTES # BLD AUTO: 2 X10E3/UL (ref 0.7–3.1)
LYMPHOCYTES NFR BLD AUTO: 32 %
MCH RBC QN AUTO: 26.4 PG (ref 26.6–33)
MCHC RBC AUTO-ENTMCNC: 31.7 G/DL (ref 31.5–35.7)
MCV RBC AUTO: 83 FL (ref 79–97)
MONOCYTES # BLD AUTO: 0.5 X10E3/UL (ref 0.1–0.9)
MONOCYTES NFR BLD AUTO: 8 %
NEUTROPHILS # BLD AUTO: 3.7 X10E3/UL (ref 1.4–7)
NEUTROPHILS NFR BLD AUTO: 59 %
PLATELET # BLD AUTO: 198 X10E3/UL (ref 150–450)
POTASSIUM SERPL-SCNC: 4.6 MMOL/L (ref 3.5–5.2)
PROT SERPL-MCNC: 7.2 G/DL (ref 6–8.5)
RBC # BLD AUTO: 4.77 X10E6/UL (ref 3.77–5.28)
SODIUM SERPL-SCNC: 143 MMOL/L (ref 134–144)
WBC # BLD AUTO: 6.3 X10E3/UL (ref 3.4–10.8)

## 2021-12-21 DIAGNOSIS — Z00.00 HEALTHCARE MAINTENANCE: Primary | ICD-10-CM

## 2021-12-21 DIAGNOSIS — K21.9 GASTROESOPHAGEAL REFLUX DISEASE WITHOUT ESOPHAGITIS: ICD-10-CM

## 2021-12-21 DIAGNOSIS — E78.5 DYSLIPIDEMIA: ICD-10-CM

## 2021-12-29 LAB
ALBUMIN SERPL-MCNC: 4.3 G/DL (ref 3.8–4.8)
ALBUMIN/GLOB SERPL: 1.9 {RATIO} (ref 1.2–2.2)
ALP SERPL-CCNC: 92 IU/L (ref 44–121)
ALT SERPL-CCNC: 22 IU/L (ref 0–32)
APPEARANCE UR: CLEAR
AST SERPL-CCNC: 22 IU/L (ref 0–40)
BACTERIA #/AREA URNS HPF: NORMAL /[HPF]
BASOPHILS # BLD AUTO: 0 X10E3/UL (ref 0–0.2)
BASOPHILS NFR BLD AUTO: 1 %
BILIRUB SERPL-MCNC: 0.3 MG/DL (ref 0–1.2)
BILIRUB UR QL STRIP: NEGATIVE
BUN SERPL-MCNC: 13 MG/DL (ref 8–27)
BUN/CREAT SERPL: 16 (ref 12–28)
CALCIUM SERPL-MCNC: 9.2 MG/DL (ref 8.7–10.3)
CASTS URNS QL MICRO: NORMAL /LPF
CHLORIDE SERPL-SCNC: 105 MMOL/L (ref 96–106)
CHOLEST SERPL-MCNC: 228 MG/DL (ref 100–199)
CO2 SERPL-SCNC: 24 MMOL/L (ref 20–29)
COLOR UR: YELLOW
CREAT SERPL-MCNC: 0.83 MG/DL (ref 0.57–1)
EOSINOPHIL # BLD AUTO: 0.1 X10E3/UL (ref 0–0.4)
EOSINOPHIL NFR BLD AUTO: 1 %
EPI CELLS #/AREA URNS HPF: NORMAL /HPF (ref 0–10)
ERYTHROCYTE [DISTWIDTH] IN BLOOD BY AUTOMATED COUNT: 15.1 % (ref 11.7–15.4)
GLOBULIN SER CALC-MCNC: 2.3 G/DL (ref 1.5–4.5)
GLUCOSE SERPL-MCNC: 104 MG/DL (ref 65–99)
GLUCOSE UR QL: NEGATIVE
HCT VFR BLD AUTO: 37.2 % (ref 34–46.6)
HDLC SERPL-MCNC: 42 MG/DL
HGB BLD-MCNC: 12.1 G/DL (ref 11.1–15.9)
HGB UR QL STRIP: NEGATIVE
IMM GRANULOCYTES # BLD AUTO: 0 X10E3/UL (ref 0–0.1)
IMM GRANULOCYTES NFR BLD AUTO: 0 %
KETONES UR QL STRIP: NEGATIVE
LDLC SERPL CALC-MCNC: 166 MG/DL (ref 0–99)
LEUKOCYTE ESTERASE UR QL STRIP: NEGATIVE
LYMPHOCYTES # BLD AUTO: 2.3 X10E3/UL (ref 0.7–3.1)
LYMPHOCYTES NFR BLD AUTO: 43 %
MCH RBC QN AUTO: 26.9 PG (ref 26.6–33)
MCHC RBC AUTO-ENTMCNC: 32.5 G/DL (ref 31.5–35.7)
MCV RBC AUTO: 83 FL (ref 79–97)
MICRO URNS: NORMAL
MICRO URNS: NORMAL
MONOCYTES # BLD AUTO: 0.5 X10E3/UL (ref 0.1–0.9)
MONOCYTES NFR BLD AUTO: 9 %
NEUTROPHILS # BLD AUTO: 2.5 X10E3/UL (ref 1.4–7)
NEUTROPHILS NFR BLD AUTO: 46 %
NITRITE UR QL STRIP: NEGATIVE
PH UR STRIP: 6.5 [PH] (ref 5–7.5)
PLATELET # BLD AUTO: 190 X10E3/UL (ref 150–450)
POTASSIUM SERPL-SCNC: 4.6 MMOL/L (ref 3.5–5.2)
PROT SERPL-MCNC: 6.6 G/DL (ref 6–8.5)
PROT UR QL STRIP: NEGATIVE
RBC # BLD AUTO: 4.49 X10E6/UL (ref 3.77–5.28)
RBC #/AREA URNS HPF: NORMAL /HPF (ref 0–2)
SODIUM SERPL-SCNC: 141 MMOL/L (ref 134–144)
SP GR UR: 1.02 (ref 1–1.03)
TRIGL SERPL-MCNC: 112 MG/DL (ref 0–149)
TSH SERPL DL<=0.005 MIU/L-ACNC: 4.26 UIU/ML (ref 0.45–4.5)
URINALYSIS REFLEX: NORMAL
UROBILINOGEN UR STRIP-MCNC: 0.2 MG/DL (ref 0.2–1)
VLDLC SERPL CALC-MCNC: 20 MG/DL (ref 5–40)
WBC # BLD AUTO: 5.3 X10E3/UL (ref 3.4–10.8)
WBC #/AREA URNS HPF: NORMAL /HPF (ref 0–5)

## 2022-01-04 ENCOUNTER — OFFICE VISIT (OUTPATIENT)
Dept: INTERNAL MEDICINE | Facility: CLINIC | Age: 67
End: 2022-01-04

## 2022-01-04 VITALS
RESPIRATION RATE: 12 BRPM | SYSTOLIC BLOOD PRESSURE: 128 MMHG | DIASTOLIC BLOOD PRESSURE: 80 MMHG | OXYGEN SATURATION: 98 % | BODY MASS INDEX: 29.19 KG/M2 | HEART RATE: 75 BPM | HEIGHT: 67 IN | WEIGHT: 186 LBS | TEMPERATURE: 96.9 F

## 2022-01-04 DIAGNOSIS — M19.042 PRIMARY OSTEOARTHRITIS OF BOTH HANDS: ICD-10-CM

## 2022-01-04 DIAGNOSIS — G25.0 ESSENTIAL TREMOR: ICD-10-CM

## 2022-01-04 DIAGNOSIS — Z86.69 HISTORY OF PERIPHERAL NEUROPATHY: ICD-10-CM

## 2022-01-04 DIAGNOSIS — M19.041 PRIMARY OSTEOARTHRITIS OF BOTH HANDS: ICD-10-CM

## 2022-01-04 DIAGNOSIS — M85.859 OSTEOPENIA OF HIP, UNSPECIFIED LATERALITY: ICD-10-CM

## 2022-01-04 DIAGNOSIS — Z00.00 HEALTHCARE MAINTENANCE: Primary | ICD-10-CM

## 2022-01-04 DIAGNOSIS — N39.41 URGE INCONTINENCE OF URINE: ICD-10-CM

## 2022-01-04 DIAGNOSIS — E78.5 DYSLIPIDEMIA: ICD-10-CM

## 2022-01-04 DIAGNOSIS — M70.62 TROCHANTERIC BURSITIS OF BOTH HIPS: ICD-10-CM

## 2022-01-04 DIAGNOSIS — M70.61 TROCHANTERIC BURSITIS OF BOTH HIPS: ICD-10-CM

## 2022-01-04 DIAGNOSIS — K21.9 GASTROESOPHAGEAL REFLUX DISEASE WITHOUT ESOPHAGITIS: ICD-10-CM

## 2022-01-04 PROBLEM — R25.2 NOCTURNAL MUSCLE CRAMPS: Status: RESOLVED | Noted: 2017-04-21 | Resolved: 2022-01-04

## 2022-01-04 PROCEDURE — 99397 PER PM REEVAL EST PAT 65+ YR: CPT | Performed by: INTERNAL MEDICINE

## 2022-01-04 PROCEDURE — G0439 PPPS, SUBSEQ VISIT: HCPCS | Performed by: INTERNAL MEDICINE

## 2022-01-04 PROCEDURE — 1126F AMNT PAIN NOTED NONE PRSNT: CPT | Performed by: INTERNAL MEDICINE

## 2022-01-04 PROCEDURE — 1170F FXNL STATUS ASSESSED: CPT | Performed by: INTERNAL MEDICINE

## 2022-01-04 PROCEDURE — 1159F MED LIST DOCD IN RCRD: CPT | Performed by: INTERNAL MEDICINE

## 2022-01-04 NOTE — PATIENT INSTRUCTIONS
Medicare Wellness  Personal Prevention Plan of Service     Date of Office Visit:  2022  Encounter Provider:  Levon Barber MD  Place of Service:  [facility]  Patient Name: Noemi Fowler  :  1955    As part of the Medicare Wellness portion of your visit today, we are providing you with this personalized preventive plan of services (PPPS). This plan is based upon recommendations of the [place] Preventive Services Task Force (USPSTF) and the Advisory Committee on Immunization Practices (ACIP).    This lists the preventive care services that should be considered, and provides dates of when you are due. Items listed as completed are up-to-date and do not require any further intervention.    Health Maintenance   Topic Date Due   • ANNUAL WELLNESS VISIT  2021   • MAMMOGRAM  2022   • PAP SMEAR  2022   • DXA SCAN  05/10/2023   • TDAP/TD VACCINES (3 - Td or Tdap) 2025   • COLORECTAL CANCER SCREENING  2030   • HEPATITIS C SCREENING  Completed   • COVID-19 Vaccine  Completed   • INFLUENZA VACCINE  Completed   • Pneumococcal Vaccine 65+  Completed   • ZOSTER VACCINE  Completed       No orders of the defined types were placed in this encounter.      Return in about 6 months (around 2022) for Recheck.

## 2022-01-04 NOTE — PROGRESS NOTES
"Subjective       Chief Complaint   Patient presents with   • Annual Exam     review of medical issues    • Arthritis       HPI:  Noemi Fowler is a 66 y.o. female RTC in yearly CPE/ AWV, review of medical issues: \"It is going. I am alive\".  Had vertigo event 12/2021.  Saw Dr. Lennon and had MRI to r/o stroke.  Sx resolved in '15 minutes' and 'went away'.  No recurrence of sx.   1. Abdominal bloating/ fullness, nausea and unexpected weight gain despite eating less/ missing some meals due to sx - had reassuring U/S. Saw Dr. Arredondo's APRN.  Got med from them, got Abx for SIBO.  Did not take med.  Did not take Pepcid either.  Stopped eating so much popcorn and night and sx resolved. \"I was eating a lot of popcorn\". Getting large bag from [employer], thinking was healthier snack.  Sx resolved after stopped and so has not done anything more. Feels like sx resolved.   2. Dyslipidemia - slight LDL progression, recalling statins deferred with low CR in 2016. \"Since last 6 weeks have been vigorously exercising 3x/ week\".  W[facility] [place] [facility] [facility], 'very calming'.  45 minutes each time.  Diet 'needs to improve, I know it does'. NOt getting fruits and vegetables near daily, 'not as much as I should'.   3. Essential Tremor - dx'd in 2015, after (-) DAMIAN scan, mild progression. \"It is bad'.  Feels like is noticeable.  L > R.  Declines meds at this time. 'it is not limiting me'.   4. Neuropathy, tingling on whole L side of body - s/p extensive w/u with Neurology, unclear etiology. Remains off Gabapentin for lack of benefit. Saw neurology 3/2021 and no change, following annually.   5. Cervical spine DJD/ DDD s/p fusion at C5-C6 and s/p epidurals in past - CHAR. No recurrence.   6. Urge Incontinence - persists, but 'is getting better'.  Feels like walking has helped.  Did work with sister as well who is PT, doing the exercises from her.  Feels like 'slowly' getting better.    7. Trochanteric bursitis B - s/p eval at ortho with " "injx, temporary response. \"They still bother me, I live with it\". Really sx mostly when lays down. Uses Voltaren gel PRN, 'that stuff is amazing'.      The following portions of the patient's history were reviewed and updated as appropriate: allergies, current medications, past family history, past medical history, past social history, past surgical history and problem list.    Review of Systems   Constitutional: Negative for chills, fever, malaise/fatigue, weight gain and weight loss.   HENT: Negative for congestion, hearing loss, odynophagia and sore throat.    Eyes: Negative for discharge, double vision, pain and redness.        Last eye exam ~11/2021; sees annually. No glasses     Cardiovascular: Negative for chest pain, dyspnea on exertion, irregular heartbeat, near-syncope, palpitations and paroxysmal nocturnal dyspnea.   Respiratory: Negative for cough and shortness of breath.    Endocrine: Negative for polydipsia, polyphagia and polyuria.   Hematologic/Lymphatic: Negative for bleeding problem. Does not bruise/bleed easily.   Skin: Negative for rash and suspicious lesions.        Sees Derm annually, appt next week     Musculoskeletal: Positive for arthritis and joint pain (B shoulders and B hips, laterally; no stiffness, variable sx). Negative for joint swelling, muscle cramps, muscle weakness and stiffness.   Gastrointestinal: Negative for bloating, abdominal pain, constipation, diarrhea, dysphagia, heartburn, nausea and vomiting.   Genitourinary: Positive for urgency (improved). Negative for dysuria, frequency, hematuria, hesitancy and incomplete emptying.   Neurological: Negative for dizziness, headaches and light-headedness.   Psychiatric/Behavioral: Negative for depression. The patient does not have insomnia (wtih stressors with son) and is not nervous/anxious.         Mood 'could be better'.  Having some issues with son.  In talk therapy, 'we are trying'.      Allergic/Immunologic: Negative for " environmental allergies and persistent infections.       Patient Care Team:  Levon Barber MD as PCP - General  Levon Barber MD as PCP - Family Medicine    Recent Hospitalizations: No recent hospitalization(s).    Depression Screen:   PHQ-2/PHQ-9 Depression Screening 1/4/2022   Little interest or pleasure in doing things 0   Feeling down, depressed, or hopeless 0   Trouble falling or staying asleep, or sleeping too much -   Feeling tired or having little energy -   Poor appetite or overeating -   Feeling bad about yourself - or that you are a failure or have let yourself or your family down -   Trouble concentrating on things, such as reading the newspaper or watching television -   Moving or speaking so slowly that other people could have noticed. Or the opposite - being so fidgety or restless that you have been moving around a lot more than usual -   Thoughts that you would be better off dead, or of hurting yourself in some way -   Total Score 0   If you checked off any problems, how difficult have these problems made it for you to do your work, take care of things at home, or get along with other people? -       Functional and Cognitive Screening:  Functional & Cognitive Status 1/4/2022   Do you have difficulty preparing food and eating? No   Do you have difficulty bathing yourself, getting dressed or grooming yourself? No   Do you have difficulty using the toilet? No   Do you have difficulty moving around from place to place? No   Do you have trouble with steps or getting out of a bed or a chair? No   Current Diet Well Balanced Diet   Dental Exam Up to date   Eye Exam Up to date   Exercise (times per week) 3 times per week   Current Exercises Include Walking   Current Exercise Activities Include -   Do you need help using the phone?  No   Are you deaf or do you have serious difficulty hearing?  No   Do you need help with transportation? No   Do you need help shopping? No   Do you need help preparing meals?   "No   Do you need help with housework?  No   Do you need help with laundry? No   Do you need help taking your medications? No   Do you need help managing money? No   Do you ever drive or ride in a car without wearing a seat belt? No   Have you felt unusual stress, anger or loneliness in the last month? No   Who do you live with? Spouse   If you need help, do you have trouble finding someone available to you? No   Have you been bothered in the last four weeks by sexual problems? No   Do you have difficulty concentrating, remembering or making decisions? -       Does the patient have evidence of cognitive impairment? no    Does not need ASA (and currently is not on it)    Vitals:    01/04/22 1013   BP: 128/80   Pulse: 75   Resp: 12   Temp: 96.9 °F (36.1 °C)   SpO2: 98%   Weight: 84.4 kg (186 lb)   Height: 170.2 cm (67\")   PainSc: 0-No pain       Body mass index is 29.13 kg/m².    Visual Acuity:  No exam data present    Advanced Care Planning:  ACP discussion was held with the patient during this visit. Patient has an advance directive in EMR which is still valid.     Objective   Physical Exam  Vitals reviewed.   Constitutional:       General: She is not in acute distress.     Appearance: Normal appearance. She is well-developed. She is not ill-appearing or toxic-appearing.   HENT:      Head: Normocephalic and atraumatic.      Right Ear: Hearing, tympanic membrane, ear canal and external ear normal.      Left Ear: Hearing, tympanic membrane, ear canal and external ear normal.      Nose: Nose normal.      Mouth/Throat:      Mouth: No injury or oral lesions.      Dentition: Abnormal dentition. Does not have dentures.      Tongue: No lesions.      Pharynx: Oropharynx is clear. Uvula midline. No pharyngeal swelling, oropharyngeal exudate, posterior oropharyngeal erythema or uvula swelling.      Comments: Bite guard in place B lower teeth.   Eyes:      General: Lids are normal. No scleral icterus.        Right eye: No " discharge.         Left eye: No discharge.      Extraocular Movements: Extraocular movements intact.      Conjunctiva/sclera: Conjunctivae normal.      Pupils: Pupils are equal, round, and reactive to light.   Neck:      Thyroid: No thyroid mass or thyromegaly.      Vascular: No carotid bruit.      Trachea: Trachea normal.   Cardiovascular:      Rate and Rhythm: Normal rate and regular rhythm.      Pulses:           Radial pulses are 2+ on the right side and 2+ on the left side.        Dorsalis pedis pulses are 2+ on the right side and 2+ on the left side.        Posterior tibial pulses are 2+ on the right side and 2+ on the left side.      Heart sounds: Normal heart sounds, S1 normal and S2 normal. No murmur heard.  No friction rub. No gallop.    Pulmonary:      Effort: Pulmonary effort is normal. No respiratory distress.      Breath sounds: Normal breath sounds. No wheezing, rhonchi or rales.   Chest:   Breasts:      Right: No axillary adenopathy or supraclavicular adenopathy.      Left: No axillary adenopathy or supraclavicular adenopathy.       Abdominal:      General: Bowel sounds are normal. There is no distension.      Palpations: Abdomen is soft. There is no mass.      Tenderness: There is no abdominal tenderness. There is no guarding or rebound.   Musculoskeletal:         General: Normal range of motion.      Cervical back: Full passive range of motion without pain. No rigidity. No muscular tenderness. Normal range of motion.      Right lower leg: No edema.      Left lower leg: No edema.      Right foot: Normal range of motion. No deformity or bunion.      Left foot: Normal range of motion. No deformity or bunion.   Feet:      Right foot:      Skin integrity: No ulcer, blister or skin breakdown.      Left foot:      Skin integrity: No ulcer, blister or skin breakdown.   Lymphadenopathy:      Cervical: No cervical adenopathy.      Upper Body:      Right upper body: No supraclavicular, axillary or pectoral  adenopathy.      Left upper body: No supraclavicular, axillary or pectoral adenopathy.      Lower Body: No right inguinal adenopathy. No left inguinal adenopathy.   Skin:     General: Skin is warm and dry.      Findings: No rash.   Neurological:      Mental Status: She is alert and oriented to person, place, and time.      Cranial Nerves: No cranial nerve deficit.      Sensory: No sensory deficit.      Motor: Tremor (L > R hand, action) present. No weakness, atrophy or abnormal muscle tone.      Gait: Gait normal.      Deep Tendon Reflexes: Reflexes are normal and symmetric.      Reflex Scores:       Patellar reflexes are 2+ on the right side and 2+ on the left side.       Achilles reflexes are 2+ on the right side and 2+ on the left side.  Psychiatric:         Mood and Affect: Mood normal.         Behavior: Behavior normal.         Thought Content: Thought content normal.         Compared to one year ago, the patient feels physical health is the same.  Compared to one year ago, the patient feels mental health is worse.    Reviewed chart for potential of high risk medication in the elderly: yes  Reviewed chart for potential of harmful drug interactions in the elderly:yes    Identification of Risk Factors:  Risk factors include: Advance Directive Discussion  Breast Cancer/Mammogram Screening  Immunizations Discussed/Encouraged (specific immunizations; Tdap, Hepatitis A Vaccine/Series, Influenza, Pneumococcal 23, Shingrix and COVID19 ).    Patient Self-Management and Personalized Health Advice  The patient has been provided with information about: diet, exercise, weight management, the relationship between weight and GERD and mental health concerns and preventive services including:   · Annual Wellness Visit (AWV)  · Bone Density Measurements  · Colorectal Cancer Screening, Colonoscopy  · Screening Mammography .    Discussed the patient's BMI with her. The BMI is above average; BMI management plan is  completed.    Assessment/Plan   Diagnoses and all orders for this visit:    1. Healthcare maintenance (Primary)    2. Primary osteoarthritis of both hands    3. Urge incontinence of urine    4. Essential tremor    5. History of peripheral neuropathy    6. Dyslipidemia    7. Gastroesophageal reflux disease without esophagitis    8. Trochanteric bursitis of both hips    9. Osteopenia of hip, unspecified laterality            Diagnoses and all orders for this visit:    Healthcare maintenance    Primary osteoarthritis of both hands    Urge incontinence of urine    Essential tremor    History of peripheral neuropathy    Dyslipidemia    Gastroesophageal reflux disease without esophagitis    Trochanteric bursitis of both hips    Osteopenia of hip, unspecified laterality      Noemi Fowler is a 66 y.o. female RTC in yearly CPE/ AWV, review of medical issues:   1. Abdominal bloating/ fullness, nausea and unexpected weight gain despite eating less/ missing some meals due to sx; hx of lap cristiano and appy and subsequent mild gastritis and Schatzki's ring/ spastic esophagus on EGD in 2017- reassuring U/S. S/P eval with Dr. Arredondo's APRN, note reviewed 11/2021.  Pt did not take Abx for SIBO, sx resolved off large volume popcorn intake. Monitor for any recurrence. C/W PPI daily.   2. Overweight/ IFG - improved exercise consistency, but struggling with diet consistency. IFG noted today.  TLC diet mods advised. C/W walking.  Trend labs with A1C.   3. Dyslipidemia -  LDL progressio again this year, HDL borderline.  Pt aware of issue and will proceed with TLC mods, tredn in 6 months.   4. Essential Tremor - dx'd in 2015, after (-) DAMIAN scan, mild progression.  L > R.  Declines meds at this time.   5. Neuropathy, tingling on whole L side of body - s/p extensive w/u with Neurology, unclear etiology. Remains off Gabapentin for lack of benefit. Reviewed neurology note 2021 and no change, following annually.   6. Cervical spine DJD/ DDD  s/p fusion at C5-C6 and s/p epidurals in past - CHAR.    7. OA, in hands and knees - minimal sx, monitor.   8. Urge Incontinence - improving with walking and PT exercises from her sister.  U/A clear.   9. Hx of LFT elevation, nearly resolved - Suspect fatty liver, though not shown U/S 1/2020 (noted past hemangioma). Monitor for now, proceed with serologic work-up if progressive. C/W high dose fish oil.   10. Trochanteric bursitis B - s/p eval at ortho with injx, temporary response. Mild sx, declines additional eval at this time.   11. HM - labs d/w pt; Flu/ Tdap/ Hep A/ Zostavax/ Prevnar/ Pvax/ Shingrix/ COVID - UTD; C-scope 12/2015 (-) -->C-scope 7/2020 (1 hyperplastic polyp); Pap / Mammo UTD 9/2021 with Gyn; DEXA normal 7/2016 per Gyn --> mild osteopenia hip 5/2021 --> DEXA due 5/2023; Hep C Ab (-) 2014; AAA (-) on 2015 CT A/P and U/S in 10/2019; c/w exercise; Preventative care plan provided to pt at end of visit    Return in about 6 months (around 7/4/2022) for Recheck. (CMP, FLP, A1C)          Current Outpatient Medications:   •  Calcium Carb-Cholecalciferol (CALCIUM + D3) 600-200 MG-UNIT tablet, Take  by mouth., Disp: , Rfl:   •  Cholecalciferol (VITAMIN D3 PO), Take 2,000 Units by mouth Daily., Disp: , Rfl:   •  Coenzyme Q10 (COQ10) 200 MG capsule, Take  by mouth., Disp: , Rfl:   •  Magnesium 500 MG capsule, Take  by mouth Daily., Disp: , Rfl:   •  Misc Natural Products (GLUCOS-CHONDROIT-MSM COMPLEX) tablet, Take  by mouth., Disp: , Rfl:   •  Omega-3 Fatty Acids (FISH OIL) 1000 MG capsule capsule, Take 2 capsules by mouth 2 (Two) Times a Day With Meals., Disp: , Rfl:   •  omeprazole (priLOSEC) 40 MG capsule, TAKE 1 CAPSULE BY MOUTH EVERY DAY, Disp: 90 capsule, Rfl: 3

## 2022-04-27 ENCOUNTER — TELEPHONE (OUTPATIENT)
Dept: INTERNAL MEDICINE | Facility: CLINIC | Age: 67
End: 2022-04-27

## 2022-04-27 NOTE — TELEPHONE ENCOUNTER
Caller: Noemi Fowler    Relationship to patient: Self    Best call back number: [phone]    New or established patient?  [] New  [x] Established    Date of discharge:4/26/22    Facility discharged from: [facility] ER    Diagnosis/Symptoms: SEVERE HEAD ACHES. MRI AND CT LOOKED NORMAL THOUGH    Length of stay (If applicable): 1 NIGHT    Specialty Only: Did you see a [employer] health provider?    [] Yes  [] No  If so, who?   PATIENT IS ASKING WHEN DR FIGUEROA WOULD WANT TO SEE HER. PLEASE ADVISE.

## 2022-05-03 ENCOUNTER — OFFICE VISIT (OUTPATIENT)
Dept: INTERNAL MEDICINE | Facility: CLINIC | Age: 67
End: 2022-05-03

## 2022-05-03 VITALS
OXYGEN SATURATION: 96 % | BODY MASS INDEX: 29.51 KG/M2 | HEART RATE: 70 BPM | WEIGHT: 188 LBS | TEMPERATURE: 97.3 F | DIASTOLIC BLOOD PRESSURE: 70 MMHG | HEIGHT: 67 IN | SYSTOLIC BLOOD PRESSURE: 118 MMHG

## 2022-05-03 DIAGNOSIS — G44.209 TENSION HEADACHE: Primary | ICD-10-CM

## 2022-05-03 DIAGNOSIS — F43.22 ADJUSTMENT DISORDER WITH ANXIOUS MOOD: ICD-10-CM

## 2022-05-03 DIAGNOSIS — R51.9 NEW ONSET HEADACHE: ICD-10-CM

## 2022-05-03 DIAGNOSIS — G25.0 ESSENTIAL TREMOR: ICD-10-CM

## 2022-05-03 PROCEDURE — 99213 OFFICE O/P EST LOW 20 MIN: CPT | Performed by: INTERNAL MEDICINE

## 2022-05-03 RX ORDER — AMITRIPTYLINE HYDROCHLORIDE 10 MG/1
10 TABLET, FILM COATED ORAL NIGHTLY
COMMUNITY
Start: 2022-04-26 | End: 2022-05-24 | Stop reason: SDUPTHER

## 2022-05-03 RX ORDER — METAXALONE 800 MG/1
TABLET ORAL
COMMUNITY
Start: 2022-04-26 | End: 2022-07-14

## 2022-05-03 RX ORDER — OMEPRAZOLE 40 MG/1
40 CAPSULE, DELAYED RELEASE ORAL DAILY
COMMUNITY
Start: 2022-04-26 | End: 2022-05-26

## 2022-05-03 NOTE — PROGRESS NOTES
"ER follow up      HPI  Noemi Fowler is a 66 y.o. female RTC in ED f/u after recent eval on 4/25/22. HA had been going on for two weeks prior to presentation.  Pt notes HA was sudden onset during therapy session with  and son/ daughter in law.  Pt had to excuse self and had marked tremors and anxiety. 'I could not listen to another word they were saying'.  Pain persisted and then progressed over two weeks.  Pain got so bad took self to ED.  Had pressure elevation in ED.  Had CTA and MRI and 'everything turned out OK'.  Told 'labeled tension'.  Given magnesium IV and toradol in ED. Steroid pack on discharge. Started on TCA at night and is helping sleep.  HA persists at 'a 3' now, was 'an 8-9/10' initially.  No side effects of TCA.  Is not on metaxalone, \"I did not start on muscle relaxer\". Worried about polypharmacy. Is not taking Tylenol at this time (did prior to ED and felt like was not helping).  No time where free of HA in last few days. Is still taking Magnesium orally daily.  No aura, no phono or photophobia.  No N/V with HA.    Pain is 'pounding on sides and back of my head'.   No hx of migraine HA ever for pt.   \"Right now it is livable\".   Saw neurology in movement d/o clinic on 4/27/22. Got good news there and 'I do not have Parkinsons disease'.  Still labeled as ET at this point. No meds started.    Is not interested in mood mgmnt meds with issues in therapy at this time.      Review of Systems   Constitutional: Negative for chills and fever.   HENT: Positive for tinnitus (comes and goes over years, not related to HA). Negative for hearing loss.    Eyes: Negative for discharge, double vision, pain, redness, vision loss in left eye and vision loss in right eye.   Cardiovascular: Negative for near-syncope and syncope.   Gastrointestinal: Negative for nausea and vomiting.   Neurological: Positive for headaches. Negative for dizziness, light-headedness and loss of balance.       The following " "portions of the patient's history were reviewed and updated as appropriate: allergies, current medications, past medical history, past social history and problem list.      Current Outpatient Medications:   •  amitriptyline (ELAVIL) 10 MG tablet, Take 10 mg by mouth Every Night., Disp: , Rfl:   •  Calcium Carb-Cholecalciferol (CALCIUM + D3) 600-200 MG-UNIT tablet, Take  by mouth., Disp: , Rfl:   •  Cholecalciferol (VITAMIN D3 PO), Take 2,000 Units by mouth Daily., Disp: , Rfl:   •  Magnesium 500 MG capsule, Take  by mouth Daily., Disp: , Rfl:   •  metaxalone (SKELAXIN) 800 MG tablet, TAKE 1 TABLET BY MOUTH 3 (THREE) TIMES DAILY AS NEEDED FOR PAIN (HEADACHE)., Disp: , Rfl:   •  Misc Natural Products (GLUCOS-CHONDROIT-MSM COMPLEX) tablet, Take  by mouth., Disp: , Rfl:   •  Omega-3 Fatty Acids (FISH OIL) 1000 MG capsule capsule, Take 2 capsules by mouth 2 (Two) Times a Day With Meals., Disp: , Rfl:   •  omeprazole (priLOSEC) 40 MG capsule, Take 40 mg by mouth Daily., Disp: , Rfl:     Vitals:    05/03/22 1315   BP: 118/70   Pulse: 70   Temp: 97.3 °F (36.3 °C)   SpO2: 96%   Weight: 85.3 kg (188 lb)   Height: 170.2 cm (67\")     Body mass index is 29.44 kg/m².      Physical Exam  Constitutional:       General: She is not in acute distress.     Appearance: Normal appearance. She is normal weight. She is not ill-appearing or toxic-appearing.      Comments: No temporal artery tenderness     HENT:      Head: Normocephalic and atraumatic.      Right Ear: Tympanic membrane, ear canal and external ear normal. There is no impacted cerumen.      Left Ear: Tympanic membrane, ear canal and external ear normal. There is no impacted cerumen.      Nose:      Right Sinus: No maxillary sinus tenderness or frontal sinus tenderness.      Left Sinus: No maxillary sinus tenderness or frontal sinus tenderness.      Mouth/Throat:      Mouth: Mucous membranes are moist.      Pharynx: Oropharynx is clear. No oropharyngeal exudate.   Eyes:      " General: No scleral icterus.     Extraocular Movements: Extraocular movements intact.      Conjunctiva/sclera: Conjunctivae normal.      Pupils: Pupils are equal, round, and reactive to light.   Neck:      Vascular: No carotid bruit.   Cardiovascular:      Rate and Rhythm: Normal rate and regular rhythm.      Heart sounds: No murmur heard.    No friction rub. No gallop.   Pulmonary:      Effort: Pulmonary effort is normal. No respiratory distress.      Breath sounds: No wheezing, rhonchi or rales.   Musculoskeletal:      Cervical back: Normal range of motion and neck supple. No tenderness.      Right lower leg: No edema.      Left lower leg: No edema.   Lymphadenopathy:      Cervical: No cervical adenopathy.   Neurological:      General: No focal deficit present.      Mental Status: She is alert.      Cranial Nerves: No cranial nerve deficit, dysarthria or facial asymmetry.      Motor: Tremor (mild action tremor L hand) present. No weakness or abnormal muscle tone.      Gait: Gait normal.   Psychiatric:         Mood and Affect: Mood normal.         Behavior: Behavior normal.         Thought Content: Thought content normal.         Assessment/ Plan  Diagnoses and all orders for this visit:    Tension headache    New onset headache    Adjustment disorder with anxious mood    Essential tremor    Other orders  -     amitriptyline (ELAVIL) 10 MG tablet; Take 10 mg by mouth Every Night.  -     metaxalone (SKELAXIN) 800 MG tablet; TAKE 1 TABLET BY MOUTH 3 (THREE) TIMES DAILY AS NEEDED FOR PAIN (HEADACHE).  -     omeprazole (priLOSEC) 40 MG capsule; Take 40 mg by mouth Daily.        Return for Next scheduled follow up.      Discussion:  Noemi Fowler is a 66 y.o. female RTC in f/u after eval on 4/25/22 for 2 weeks of sudden onset severe HA during very stressful therpay session with sone and daughter in law.  Pt underwent MRI brain, CTA head and neck, and MRI venogram brain with overall negative results other than  unrelated Mild chronic ischemic small vessel white matter changes.  Pt responded to steroid taper, magnesium IV, ketoralac, and start of low dose TCA at night.  HA pain now 3/10.  Exam remains benign and features remain more c/w tension type HA >> migraine (no hx of migraine noted).  No temporal artery tenderness on exam today.  I d/w pt I would like her to add metaxalone qhs for now to see if can break HA cycle. C/W TCA as is. If HA cycle breaks, then may stop muscle relaxer.  C/W therapy as she is, now in individual therapy as well as family.  Declines additional mood modulating meds today. Call for any changes in HA pattern or intensity.     RTC in f/u as planned.

## 2022-05-13 ENCOUNTER — IMMUNIZATION (OUTPATIENT)
Dept: VACCINE CLINIC | Facility: HOSPITAL | Age: 67
End: 2022-05-13

## 2022-05-13 DIAGNOSIS — Z23 NEED FOR VACCINATION: Primary | ICD-10-CM

## 2022-05-13 PROCEDURE — 0054A HC ADM SARSCV2 30MCG TRS-SUCR BOOSTER: CPT | Performed by: INTERNAL MEDICINE

## 2022-05-13 PROCEDURE — 91305 HC SARSCOV2 VAC 30 MCG TRS-SUCR PFIZER: CPT | Performed by: INTERNAL MEDICINE

## 2022-05-24 RX ORDER — AMITRIPTYLINE HYDROCHLORIDE 10 MG/1
10 TABLET, FILM COATED ORAL NIGHTLY
Qty: 90 TABLET | Refills: 3 | Status: SHIPPED | OUTPATIENT
Start: 2022-05-24 | End: 2022-07-14

## 2022-06-28 DIAGNOSIS — E78.5 DYSLIPIDEMIA: Primary | ICD-10-CM

## 2022-07-08 LAB
ALBUMIN SERPL-MCNC: 4.8 G/DL (ref 3.8–4.8)
ALBUMIN/GLOB SERPL: 2.1 {RATIO} (ref 1.2–2.2)
ALP SERPL-CCNC: 107 IU/L (ref 44–121)
ALT SERPL-CCNC: 35 IU/L (ref 0–32)
AST SERPL-CCNC: 35 IU/L (ref 0–40)
BILIRUB SERPL-MCNC: 0.7 MG/DL (ref 0–1.2)
BUN SERPL-MCNC: 15 MG/DL (ref 8–27)
BUN/CREAT SERPL: 16 (ref 12–28)
CALCIUM SERPL-MCNC: 9.7 MG/DL (ref 8.7–10.3)
CHLORIDE SERPL-SCNC: 103 MMOL/L (ref 96–106)
CHOLEST SERPL-MCNC: 273 MG/DL (ref 100–199)
CO2 SERPL-SCNC: 28 MMOL/L (ref 20–29)
CREAT SERPL-MCNC: 0.94 MG/DL (ref 0.57–1)
EGFRCR SERPLBLD CKD-EPI 2021: 67 ML/MIN/1.73
GLOBULIN SER CALC-MCNC: 2.3 G/DL (ref 1.5–4.5)
GLUCOSE SERPL-MCNC: 101 MG/DL (ref 65–99)
HBA1C MFR BLD: 5.4 % (ref 4.8–5.6)
HDLC SERPL-MCNC: 40 MG/DL
LDLC SERPL CALC-MCNC: 212 MG/DL (ref 0–99)
POTASSIUM SERPL-SCNC: 4.5 MMOL/L (ref 3.5–5.2)
PROT SERPL-MCNC: 7.1 G/DL (ref 6–8.5)
SODIUM SERPL-SCNC: 143 MMOL/L (ref 134–144)
TRIGL SERPL-MCNC: 115 MG/DL (ref 0–149)
VLDLC SERPL CALC-MCNC: 21 MG/DL (ref 5–40)

## 2022-07-14 ENCOUNTER — OFFICE VISIT (OUTPATIENT)
Dept: INTERNAL MEDICINE | Facility: CLINIC | Age: 67
End: 2022-07-14

## 2022-07-14 VITALS
HEART RATE: 74 BPM | TEMPERATURE: 97.4 F | HEIGHT: 67 IN | SYSTOLIC BLOOD PRESSURE: 110 MMHG | DIASTOLIC BLOOD PRESSURE: 90 MMHG | BODY MASS INDEX: 29.35 KG/M2 | WEIGHT: 187 LBS | OXYGEN SATURATION: 98 %

## 2022-07-14 DIAGNOSIS — R51.9 NEW ONSET HEADACHE: ICD-10-CM

## 2022-07-14 DIAGNOSIS — R73.01 IFG (IMPAIRED FASTING GLUCOSE): ICD-10-CM

## 2022-07-14 DIAGNOSIS — E78.5 DYSLIPIDEMIA: Primary | ICD-10-CM

## 2022-07-14 DIAGNOSIS — F43.22 ADJUSTMENT DISORDER WITH ANXIOUS MOOD: ICD-10-CM

## 2022-07-14 DIAGNOSIS — H53.8 BLURRY VISION: ICD-10-CM

## 2022-07-14 DIAGNOSIS — R74.01 ELEVATED ALT MEASUREMENT: ICD-10-CM

## 2022-07-14 DIAGNOSIS — T50.905A MEDICATION SIDE EFFECT, INITIAL ENCOUNTER: ICD-10-CM

## 2022-07-14 PROCEDURE — 99214 OFFICE O/P EST MOD 30 MIN: CPT | Performed by: INTERNAL MEDICINE

## 2022-07-14 RX ORDER — OMEPRAZOLE 40 MG/1
CAPSULE, DELAYED RELEASE ORAL
COMMUNITY
End: 2022-10-17

## 2022-07-14 NOTE — PROGRESS NOTES
"Hyperlipidemia and Blood Sugar Problem      HPI  Noemi Fowler is a 66 y.o. female RTC in f/u: Notes that is better overall. Has been under a great deal of stress with sonmason, caridad ut rltshp is better and mood is better. HA issues noted last visit are 'gone'. Has HA appt at neurology later in summer. Told needs to see them 'in case it happens again'. Thinks HA was all stress related. Recalls started when had anxiety attack at therapist.  Still in counseling, couples.   Is on low dose of TCA at night. Feels like has been 'messing up my eyes'. Has some blurry vision in AM.  Will have some 'caking in L eye in AM' and can be watery as well, just happened on a few times on that.  Had one event of sun sensitivity. Med is really helping with sleep. Tried to cut to 5mg in last 14 days, still blurry.     Busy working on remudding ceilings in her house.  Has noted some tingly feeling on L posterior forearm in recetn times. No weakness. No radiation from neck. \"what is that?\"    Overweight/ IFG - is not exercising. Is really active at home working on house.  Diet is better, more fruits now and still working on more vegetables.  Occasional gin and tonic.  No other major changes. No exercise formally at this time.     Dyslipidemia -  LDL progression, pt aware. Really does not want to be on meds.      Review of Systems   Constitutional: Negative for chills and fever.   Eyes: Positive for blurred vision and photophobia (single event on L ). Negative for discharge (on L, resolved), double vision, pain, vision loss in left eye and vision loss in right eye.   Skin: Negative for rash.   Neurological: Positive for numbness (at L posterior forearm). Negative for focal weakness, headaches (resolved) and weakness.   Psychiatric/Behavioral: Negative for altered mental status and depression. The patient is nervous/anxious (much improved).        The following portions of the patient's history were reviewed and updated as appropriate: " "allergies, current medications, past medical history, past social history and problem list.      Current Outpatient Medications:   •  Calcium Carb-Cholecalciferol (CALCIUM + D3) 600-200 MG-UNIT tablet, Take  by mouth., Disp: , Rfl:   •  Cholecalciferol (VITAMIN D3 PO), Take 2,000 Units by mouth Daily., Disp: , Rfl:   •  Magnesium 500 MG capsule, Take  by mouth Daily., Disp: , Rfl:   •  Misc Natural Products (GLUCOS-CHONDROIT-MSM COMPLEX) tablet, Take  by mouth., Disp: , Rfl:   •  Omega-3 Fatty Acids (FISH OIL) 1000 MG capsule capsule, Take 2 capsules by mouth 2 (Two) Times a Day With Meals., Disp: , Rfl:   •  omeprazole (priLOSEC) 40 MG capsule, , Disp: , Rfl:     Vitals:    07/14/22 1052   BP: 110/90   Pulse: 74   Temp: 97.4 °F (36.3 °C)   SpO2: 98%   Weight: 84.8 kg (187 lb)   Height: 170.2 cm (67.01\")     Body mass index is 29.28 kg/m².      Physical Exam  Constitutional:       General: She is not in acute distress.     Appearance: Normal appearance. She is normal weight. She is not ill-appearing or toxic-appearing.   HENT:      Head: Normocephalic and atraumatic.   Eyes:      General: No scleral icterus.     Pupils: Pupils are equal, round, and reactive to light.   Neck:      Vascular: No carotid bruit.   Cardiovascular:      Rate and Rhythm: Normal rate and regular rhythm.      Heart sounds: No murmur heard.    No friction rub. No gallop.   Pulmonary:      Effort: Pulmonary effort is normal. No respiratory distress.      Breath sounds: No wheezing, rhonchi or rales.   Musculoskeletal:      Left elbow: No swelling, deformity or effusion. Normal range of motion. No tenderness ( no TTP over ulnar groove or with flexion of elbow).      Right hand: No bony tenderness. Normal range of motion. Normal strength. Normal pulse.      Left hand: No bony tenderness. Normal range of motion. Normal strength. Normal pulse.      Cervical back: Normal range of motion and neck supple. No tenderness.      Right lower leg: No edema. "      Left lower leg: No edema.   Lymphadenopathy:      Cervical: No cervical adenopathy.   Neurological:      General: No focal deficit present.      Mental Status: She is alert.      Cranial Nerves: No cranial nerve deficit.      Gait: Gait normal.   Psychiatric:         Mood and Affect: Mood normal.         Behavior: Behavior normal.         Thought Content: Thought content normal.         Assessment/ Plan  Diagnoses and all orders for this visit:    Dyslipidemia    IFG (impaired fasting glucose)    New onset headache    Adjustment disorder with anxious mood    Elevated ALT measurement    Medication side effect, initial encounter    Blurry vision    Other orders  -     omeprazole (priLOSEC) 40 MG capsule        Return in about 6 months (around 1/14/2023) for Medicare Wellness, Annual physical.      Discussion:  Noemi Fowler is a 66 y.o. female RTC in f/u:   1. Adjustment D/O with panic event and new onset HA - overall much better with work with counselor. HAIR resolved.  Making strides in managing stressors.  Plans to see HA clinic regardless in case of recurrence.   --> Insomnia, adjustment - much better now on low dose TCA. Weaned to 5mg due to blurry vision issues, no better.  Will D/C med now and see how vision trends off med. Optho appt needed if not better off med.  Sleep hygiene advised as comes off med.   2. Overweight/ IFG - active, but no exercise. TLC diets mods noted. FBS improved today, A1C normal.  C/W efforts.   3. Dyslipidemia -  LDL markedly progressive again this check, HDL borderline.  Pt aware of issue, declines meds for now.  I d/w pt LDL of 212 is absolute statin indication, but declines. Advised 3-5x/ week exercise in addition to her home improvement work.  TLC diet with more fruits and vegetables.  Will trend next labs. ? Confounding element of high dose fish oil, may need to change to Vascepa.  4. ALT elevation - modest, recurrent.  Hx of fatty liver, per pt, but not seen on last US in  2020.  Will hold on w/u unless progressive and advised TLC mods. C/W high dose fish oil.   5. L forearm tingling - I supect is from cervical origin with overhead work at home.  Exam unremarkable today.  Will monitor as finishes overhead work.     RTC 6 months CPE/ AWV, F labs prior     Answers for HPI/ROS submitted by the patient on 7/9/2022  What is the primary reason for your visit?: Physical

## 2022-09-13 ENCOUNTER — OFFICE VISIT (OUTPATIENT)
Dept: INTERNAL MEDICINE | Facility: CLINIC | Age: 67
End: 2022-09-13

## 2022-09-13 VITALS
HEART RATE: 80 BPM | OXYGEN SATURATION: 97 % | DIASTOLIC BLOOD PRESSURE: 70 MMHG | HEIGHT: 67 IN | BODY MASS INDEX: 29.51 KG/M2 | SYSTOLIC BLOOD PRESSURE: 118 MMHG | WEIGHT: 188 LBS | TEMPERATURE: 96.9 F

## 2022-09-13 DIAGNOSIS — N39.41 URGE INCONTINENCE OF URINE: ICD-10-CM

## 2022-09-13 DIAGNOSIS — R19.4 CHANGE IN STOOL HABITS: Primary | ICD-10-CM

## 2022-09-13 DIAGNOSIS — R19.5 LOOSE STOOLS: ICD-10-CM

## 2022-09-13 PROCEDURE — 99214 OFFICE O/P EST MOD 30 MIN: CPT | Performed by: INTERNAL MEDICINE

## 2022-09-13 RX ORDER — AMITRIPTYLINE HYDROCHLORIDE 10 MG/1
TABLET, FILM COATED ORAL
COMMUNITY
Start: 2022-09-11 | End: 2022-09-13

## 2022-09-13 NOTE — PROGRESS NOTES
"Diarrhea (Pt states every time she eats after a hour she has a bowel movement )      HPI  Noemi Fowler is a 67 y.o. female RTC in acute care:  \"I dont know what the scoop is\". Started 'about 2 weeks ago' with having a 'soft' BM with cramping about one hour after meal.  Normal pattern is one formed BM in AM. No change in appetite.  Issue has been progressive over last few weeks.  Getting some urgency in last week and even a few accidents. Not had any diarrhea events at all.  Only trigger is a meal. NO pain in between BM's.  No fevers.  No blood in stool.  \"Starting to loose weight\" with this issue, lost 3 pounds. No treatment tried.    No new meds. No new supplements.  No sick contacts.  Recent trip to [facility], but sx preceded trip.   Was here in 7/2022 and had progressive LDL.  Is 'up to 4000mg' of fish oil daily. THinks increased after discussion in 7/2022 about lipids.   Is on magnesium daily to 'help regulate myself'.      Leaving on trip to [place] on Friday AM, driving. Worried well about issue affecting trip.     Review of Systems   Constitutional: Positive for weight loss (3#). Negative for chills, decreased appetite and fever.   Gastrointestinal: Positive for change in bowel habit and bowel incontinence (a few events in last week). Negative for bloating, abdominal pain, constipation, diarrhea (soft stools only, no watery stools. Up to 3x/ day now after meals only), hematochezia, melena, nausea and vomiting.   Genitourinary: Negative for dysuria.       The following portions of the patient's history were reviewed and updated as appropriate: allergies, current medications, past medical history, past social history and problem list.      Current Outpatient Medications:   •  Calcium Carb-Cholecalciferol (CALCIUM + D3) 600-200 MG-UNIT tablet, Take  by mouth., Disp: , Rfl:   •  Cholecalciferol (VITAMIN D3 PO), Take 2,000 Units by mouth Daily., Disp: , Rfl:   •  Misc Natural Products (GLUCOS-CHONDROIT-MSM " "COMPLEX) tablet, Take  by mouth., Disp: , Rfl:   •  omeprazole (priLOSEC) 40 MG capsule, , Disp: , Rfl:     Vitals:    09/13/22 1428   BP: 118/70   Pulse: 80   Temp: 96.9 °F (36.1 °C)   SpO2: 97%   Weight: 85.3 kg (188 lb)   Height: 170.2 cm (67.01\")     Body mass index is 29.44 kg/m².      Physical Exam  Constitutional:       General: She is not in acute distress.     Appearance: Normal appearance. She is normal weight. She is not ill-appearing or toxic-appearing.   HENT:      Head: Normocephalic and atraumatic.   Eyes:      General: No scleral icterus.  Cardiovascular:      Rate and Rhythm: Normal rate and regular rhythm.      Heart sounds: No murmur heard.    No friction rub. No gallop.   Pulmonary:      Effort: Pulmonary effort is normal. No respiratory distress.      Breath sounds: No wheezing, rhonchi or rales.   Abdominal:      General: Abdomen is flat. Bowel sounds are normal. There is no distension.      Palpations: Abdomen is soft. There is no mass.      Tenderness: There is abdominal tenderness (minimal) in the epigastric area. There is no guarding or rebound. Negative signs include Villaseñor's sign.   Musculoskeletal:      Cervical back: Normal range of motion.      Right lower leg: No edema.      Left lower leg: No edema.   Neurological:      General: No focal deficit present.      Mental Status: She is alert.      Cranial Nerves: No cranial nerve deficit.      Gait: Gait normal.   Psychiatric:         Mood and Affect: Mood normal.         Behavior: Behavior normal.         Thought Content: Thought content normal.         Assessment/ Plan  Diagnoses and all orders for this visit:    Change in stool habits  -     CBC & Differential  -     TSH  -     UA / M With / Rflx Culture([facility] ONLY) - Urine, Clean Catch  -     Magnesium  -     Comprehensive Metabolic Panel    Loose stools  -     CBC & Differential  -     TSH  -     UA / M With / Rflx Culture([facility] ONLY) - Urine, Clean Catch  -     Magnesium  -     " Comprehensive Metabolic Panel    Urge incontinence of urine  -     UA / M With / Rflx Culture([facility] ONLY) - Urine, Clean Catch    Other orders  -     Discontinue: amitriptyline (ELAVIL) 10 MG tablet        Return for Next scheduled follow up.      Discussion:  Noemi Fowler is a 67 y.o. female with recent hx of adjustment d/o issues and progressive LDL/ hyperlipidemai RTC in acute care (new issue to examiner) with 2 + weeks progressive soft stools with urgency isolated to after each meal.  No sx in between meals and feels well other wise, though thinks may have lost a few pounds due to this.  Exam largely benign.  Interestingly, pt does think she increased fish oil dosing after our prior lipid discussion, but is not clear. Is on long term high dose magnesium to 'regulate me'.  I am very suspicious given patten that this is med related.   - Stop fish oil and magnesium today.  - C/W regular diet  - Will check labs as above given time urgency prior to trip to r/o secondary issues.   -Call pt later this week to assess progress, consider fiber addition if needed.     RTC as planned.     Answers for HPI/ROS submitted by the patient on 9/13/2022  Please describe your symptoms.: Intestinal issues, Almost after every meal, i have a soft bm  Have you had these symptoms before?: Yes  How long have you been having these symptoms?: 1-2 weeks  Please list any medications you are currently taking for this condition.: None  What is the primary reason for your visit?: Other

## 2022-09-15 ENCOUNTER — TELEPHONE (OUTPATIENT)
Dept: INTERNAL MEDICINE | Facility: CLINIC | Age: 67
End: 2022-09-15

## 2022-09-15 LAB
ALBUMIN SERPL-MCNC: 4.6 G/DL (ref 3.5–5.2)
ALBUMIN/GLOB SERPL: 2.1 G/DL
ALP SERPL-CCNC: 88 U/L (ref 39–117)
ALT SERPL-CCNC: 17 U/L (ref 1–33)
APPEARANCE UR: CLEAR
AST SERPL-CCNC: 24 U/L (ref 1–32)
BACTERIA #/AREA URNS HPF: NORMAL /HPF
BACTERIA UR CULT: NO GROWTH
BACTERIA UR CULT: NORMAL
BASOPHILS # BLD AUTO: 0.04 10*3/MM3 (ref 0–0.2)
BASOPHILS NFR BLD AUTO: 0.6 % (ref 0–1.5)
BILIRUB SERPL-MCNC: 0.5 MG/DL (ref 0–1.2)
BILIRUB UR QL STRIP: NEGATIVE
BUN SERPL-MCNC: 11 MG/DL (ref 8–23)
BUN/CREAT SERPL: 11.3 (ref 7–25)
CALCIUM SERPL-MCNC: 9 MG/DL (ref 8.6–10.5)
CASTS URNS QL MICRO: NORMAL /LPF
CHLORIDE SERPL-SCNC: 106 MMOL/L (ref 98–107)
CO2 SERPL-SCNC: 29 MMOL/L (ref 22–29)
COLOR UR: YELLOW
CREAT SERPL-MCNC: 0.97 MG/DL (ref 0.57–1)
EGFRCR-CYS SERPLBLD CKD-EPI 2021: 63.8 ML/MIN/1.73
EOSINOPHIL # BLD AUTO: 0.08 10*3/MM3 (ref 0–0.4)
EOSINOPHIL NFR BLD AUTO: 1.2 % (ref 0.3–6.2)
EPI CELLS #/AREA URNS HPF: NORMAL /HPF (ref 0–10)
ERYTHROCYTE [DISTWIDTH] IN BLOOD BY AUTOMATED COUNT: 13.6 % (ref 12.3–15.4)
GLOBULIN SER CALC-MCNC: 2.2 GM/DL
GLUCOSE SERPL-MCNC: 118 MG/DL (ref 65–99)
GLUCOSE UR QL STRIP: NEGATIVE
HCT VFR BLD AUTO: 34.4 % (ref 34–46.6)
HGB BLD-MCNC: 10.7 G/DL (ref 12–15.9)
HGB UR QL STRIP: NEGATIVE
IMM GRANULOCYTES # BLD AUTO: 0.02 10*3/MM3 (ref 0–0.05)
IMM GRANULOCYTES NFR BLD AUTO: 0.3 % (ref 0–0.5)
KETONES UR QL STRIP: ABNORMAL
LEUKOCYTE ESTERASE UR QL STRIP: ABNORMAL
LYMPHOCYTES # BLD AUTO: 2.24 10*3/MM3 (ref 0.7–3.1)
LYMPHOCYTES NFR BLD AUTO: 34.3 % (ref 19.6–45.3)
MAGNESIUM SERPL-MCNC: 2 MG/DL (ref 1.6–2.4)
MCH RBC QN AUTO: 26.2 PG (ref 26.6–33)
MCHC RBC AUTO-ENTMCNC: 31.1 G/DL (ref 31.5–35.7)
MCV RBC AUTO: 84.3 FL (ref 79–97)
MICRO URNS: ABNORMAL
MONOCYTES # BLD AUTO: 0.66 10*3/MM3 (ref 0.1–0.9)
MONOCYTES NFR BLD AUTO: 10.1 % (ref 5–12)
NEUTROPHILS # BLD AUTO: 3.5 10*3/MM3 (ref 1.7–7)
NEUTROPHILS NFR BLD AUTO: 53.5 % (ref 42.7–76)
NITRITE UR QL STRIP: NEGATIVE
NRBC BLD AUTO-RTO: 0 /100 WBC (ref 0–0.2)
PH UR STRIP: 5.5 [PH] (ref 5–7.5)
PLATELET # BLD AUTO: 220 10*3/MM3 (ref 140–450)
POTASSIUM SERPL-SCNC: 4.1 MMOL/L (ref 3.5–5.2)
PROT SERPL-MCNC: 6.8 G/DL (ref 6–8.5)
PROT UR QL STRIP: ABNORMAL
RBC # BLD AUTO: 4.08 10*6/MM3 (ref 3.77–5.28)
RBC #/AREA URNS HPF: NORMAL /HPF (ref 0–2)
SODIUM SERPL-SCNC: 144 MMOL/L (ref 136–145)
SP GR UR STRIP: 1.03 (ref 1–1.03)
TSH SERPL DL<=0.005 MIU/L-ACNC: 1.42 UIU/ML (ref 0.27–4.2)
URINALYSIS REFLEX: ABNORMAL
UROBILINOGEN UR STRIP-MCNC: 0.2 MG/DL (ref 0.2–1)
WBC # BLD AUTO: 6.54 10*3/MM3 (ref 3.4–10.8)
WBC #/AREA URNS HPF: NORMAL /HPF (ref 0–5)

## 2022-09-15 NOTE — TELEPHONE ENCOUNTER
----- Message from Levon Barber MD sent at 9/15/2022 12:45 PM EDT -----  Call pt. How are stools off fish oil and magnesium??

## 2022-09-16 ENCOUNTER — TELEPHONE (OUTPATIENT)
Dept: INTERNAL MEDICINE | Facility: CLINIC | Age: 67
End: 2022-09-16

## 2022-09-16 NOTE — TELEPHONE ENCOUNTER
I cannot do anything about her leaving town on trip.  Yes, make appt for her return.   I would advise her to seek care emergently if SOA, bleeding, black stools or other concerns while out of town.

## 2022-09-16 NOTE — TELEPHONE ENCOUNTER
----- Message from Levon Barber MD sent at 9/15/2022  5:35 PM EDT -----  Results called to pt 09/16/22  New, mild anemia noted on labs.  Rest of labs look good.  Would ideally like pt to come in for recheck (CBC, iron panel, ferritin, B12, folate) Friday AM and to provide stool for hemeoccult sample (if able) prior to leaving town.

## 2022-09-16 NOTE — TELEPHONE ENCOUNTER
I called patient and she was already on the road to [place].   She understood her results, and said she will have her labs checked when she comes back.   Ok for patient to wait til 10/3?

## 2022-10-03 DIAGNOSIS — D64.9 ANEMIA, UNSPECIFIED TYPE: Primary | ICD-10-CM

## 2022-10-03 DIAGNOSIS — E78.5 DYSLIPIDEMIA: ICD-10-CM

## 2022-10-03 DIAGNOSIS — R73.01 IFG (IMPAIRED FASTING GLUCOSE): ICD-10-CM

## 2022-10-03 DIAGNOSIS — D50.9 IRON DEFICIENCY ANEMIA, UNSPECIFIED IRON DEFICIENCY ANEMIA TYPE: Primary | ICD-10-CM

## 2022-10-04 LAB
BASOPHILS # BLD AUTO: 0.03 10*3/MM3 (ref 0–0.2)
BASOPHILS NFR BLD AUTO: 0.5 % (ref 0–1.5)
EOSINOPHIL # BLD AUTO: 0.05 10*3/MM3 (ref 0–0.4)
EOSINOPHIL NFR BLD AUTO: 0.9 % (ref 0.3–6.2)
ERYTHROCYTE [DISTWIDTH] IN BLOOD BY AUTOMATED COUNT: 14.3 % (ref 12.3–15.4)
FERRITIN SERPL-MCNC: 9.29 NG/ML (ref 13–150)
FOLATE SERPL-MCNC: 13.7 NG/ML (ref 4.78–24.2)
HCT VFR BLD AUTO: 34 % (ref 34–46.6)
HGB BLD-MCNC: 10.8 G/DL (ref 12–15.9)
IMM GRANULOCYTES # BLD AUTO: 0.01 10*3/MM3 (ref 0–0.05)
IMM GRANULOCYTES NFR BLD AUTO: 0.2 % (ref 0–0.5)
IRON SERPL-MCNC: 36 MCG/DL (ref 37–145)
LYMPHOCYTES # BLD AUTO: 1.96 10*3/MM3 (ref 0.7–3.1)
LYMPHOCYTES NFR BLD AUTO: 35.4 % (ref 19.6–45.3)
MCH RBC QN AUTO: 25.1 PG (ref 26.6–33)
MCHC RBC AUTO-ENTMCNC: 31.8 G/DL (ref 31.5–35.7)
MCV RBC AUTO: 79.1 FL (ref 79–97)
MONOCYTES # BLD AUTO: 0.51 10*3/MM3 (ref 0.1–0.9)
MONOCYTES NFR BLD AUTO: 9.2 % (ref 5–12)
NEUTROPHILS # BLD AUTO: 2.97 10*3/MM3 (ref 1.7–7)
NEUTROPHILS NFR BLD AUTO: 53.8 % (ref 42.7–76)
NRBC BLD AUTO-RTO: 0 /100 WBC (ref 0–0.2)
PLATELET # BLD AUTO: 184 10*3/MM3 (ref 140–450)
RBC # BLD AUTO: 4.3 10*6/MM3 (ref 3.77–5.28)
RETICS/RBC NFR AUTO: 1.43 % (ref 0.7–1.9)
VIT B12 SERPL-MCNC: 318 PG/ML (ref 211–946)
WBC # BLD AUTO: 5.53 10*3/MM3 (ref 3.4–10.8)

## 2022-10-07 LAB
BACTERIA SPEC CULT: NORMAL
BACTERIA SPEC CULT: NORMAL
CAMPYLOBACTER STL CULT: NORMAL
E COLI SXT STL QL IA: NEGATIVE
SALM + SHIG STL CULT: NORMAL

## 2022-10-10 LAB
O+P SPEC MICRO: NORMAL
O+P STL CONC: NORMAL

## 2022-10-17 DIAGNOSIS — K21.9 GASTRO-ESOPHAGEAL REFLUX DISEASE WITHOUT ESOPHAGITIS: ICD-10-CM

## 2022-10-17 DIAGNOSIS — K22.2 ESOPHAGEAL OBSTRUCTION: ICD-10-CM

## 2022-10-17 RX ORDER — OMEPRAZOLE 40 MG/1
CAPSULE, DELAYED RELEASE ORAL
Qty: 90 CAPSULE | Refills: 3 | Status: SHIPPED | OUTPATIENT
Start: 2022-10-17

## 2022-11-16 DIAGNOSIS — D64.9 ANEMIA, UNSPECIFIED TYPE: ICD-10-CM

## 2022-11-16 DIAGNOSIS — D50.9 IRON DEFICIENCY ANEMIA, UNSPECIFIED IRON DEFICIENCY ANEMIA TYPE: Primary | ICD-10-CM

## 2022-11-16 LAB
BASOPHILS # BLD AUTO: 0.03 10*3/MM3 (ref 0–0.2)
BASOPHILS NFR BLD AUTO: 0.5 % (ref 0–1.5)
EOSINOPHIL # BLD AUTO: 0.05 10*3/MM3 (ref 0–0.4)
EOSINOPHIL NFR BLD AUTO: 0.9 % (ref 0.3–6.2)
ERYTHROCYTE [DISTWIDTH] IN BLOOD BY AUTOMATED COUNT: 19.5 % (ref 12.3–15.4)
HCT VFR BLD AUTO: 42.5 % (ref 34–46.6)
HGB BLD-MCNC: 13.5 G/DL (ref 12–15.9)
IMM GRANULOCYTES # BLD AUTO: 0.01 10*3/MM3 (ref 0–0.05)
IMM GRANULOCYTES NFR BLD AUTO: 0.2 % (ref 0–0.5)
IRON SATN MFR SERPL: 14 % (ref 20–50)
IRON SERPL-MCNC: 67 MCG/DL (ref 37–145)
LYMPHOCYTES # BLD AUTO: 2.02 10*3/MM3 (ref 0.7–3.1)
LYMPHOCYTES NFR BLD AUTO: 35.1 % (ref 19.6–45.3)
MCH RBC QN AUTO: 26.5 PG (ref 26.6–33)
MCHC RBC AUTO-ENTMCNC: 31.8 G/DL (ref 31.5–35.7)
MCV RBC AUTO: 83.5 FL (ref 79–97)
MONOCYTES # BLD AUTO: 0.47 10*3/MM3 (ref 0.1–0.9)
MONOCYTES NFR BLD AUTO: 8.2 % (ref 5–12)
NEUTROPHILS # BLD AUTO: 3.17 10*3/MM3 (ref 1.7–7)
NEUTROPHILS NFR BLD AUTO: 55.1 % (ref 42.7–76)
NRBC BLD AUTO-RTO: 0 /100 WBC (ref 0–0.2)
PLATELET # BLD AUTO: 178 10*3/MM3 (ref 140–450)
RBC # BLD AUTO: 5.09 10*6/MM3 (ref 3.77–5.28)
TIBC SERPL-MCNC: 466 MCG/DL
UIBC SERPL-MCNC: 399 MCG/DL (ref 112–346)
WBC # BLD AUTO: 5.75 10*3/MM3 (ref 3.4–10.8)

## 2022-11-29 ENCOUNTER — APPOINTMENT (OUTPATIENT)
Dept: WOMENS IMAGING | Facility: HOSPITAL | Age: 67
End: 2022-11-29

## 2022-11-29 PROCEDURE — 77067 SCR MAMMO BI INCL CAD: CPT | Performed by: RADIOLOGY

## 2022-11-29 PROCEDURE — 77063 BREAST TOMOSYNTHESIS BI: CPT | Performed by: RADIOLOGY

## 2023-01-11 DIAGNOSIS — R73.01 IFG (IMPAIRED FASTING GLUCOSE): ICD-10-CM

## 2023-01-11 DIAGNOSIS — E78.5 DYSLIPIDEMIA: Primary | ICD-10-CM

## 2023-01-11 DIAGNOSIS — I10 HYPERTENSION, UNSPECIFIED TYPE: ICD-10-CM

## 2023-01-13 LAB
ALBUMIN SERPL-MCNC: 4.4 G/DL (ref 3.5–5.2)
ALBUMIN/CREAT UR: <7 MG/G CREAT (ref 0–29)
ALBUMIN/GLOB SERPL: 2 G/DL
ALP SERPL-CCNC: 91 U/L (ref 39–117)
ALT SERPL-CCNC: 21 U/L (ref 1–33)
APPEARANCE UR: CLEAR
AST SERPL-CCNC: 19 U/L (ref 1–32)
BACTERIA #/AREA URNS HPF: NORMAL /HPF
BASOPHILS # BLD AUTO: 0.03 10*3/MM3 (ref 0–0.2)
BASOPHILS NFR BLD AUTO: 0.6 % (ref 0–1.5)
BILIRUB SERPL-MCNC: 0.5 MG/DL (ref 0–1.2)
BILIRUB UR QL STRIP: NEGATIVE
BUN SERPL-MCNC: 13 MG/DL (ref 8–23)
BUN/CREAT SERPL: 14.3 (ref 7–25)
CALCIUM SERPL-MCNC: 9.6 MG/DL (ref 8.6–10.5)
CASTS URNS QL MICRO: NORMAL /LPF
CHLORIDE SERPL-SCNC: 105 MMOL/L (ref 98–107)
CHOLEST SERPL-MCNC: 209 MG/DL (ref 0–200)
CO2 SERPL-SCNC: 31.6 MMOL/L (ref 22–29)
COLOR UR: YELLOW
CREAT SERPL-MCNC: 0.91 MG/DL (ref 0.57–1)
CREAT UR-MCNC: 41.9 MG/DL
EGFRCR SERPLBLD CKD-EPI 2021: 68.9 ML/MIN/1.73
EOSINOPHIL # BLD AUTO: 0.05 10*3/MM3 (ref 0–0.4)
EOSINOPHIL NFR BLD AUTO: 1 % (ref 0.3–6.2)
EPI CELLS #/AREA URNS HPF: NORMAL /HPF (ref 0–10)
ERYTHROCYTE [DISTWIDTH] IN BLOOD BY AUTOMATED COUNT: 15.7 % (ref 12.3–15.4)
GLOBULIN SER CALC-MCNC: 2.2 GM/DL
GLUCOSE SERPL-MCNC: 104 MG/DL (ref 65–99)
GLUCOSE UR QL STRIP: NEGATIVE
HBA1C MFR BLD: 5.5 % (ref 4.8–5.6)
HCT VFR BLD AUTO: 41.6 % (ref 34–46.6)
HDLC SERPL-MCNC: 39 MG/DL (ref 40–60)
HGB BLD-MCNC: 13.6 G/DL (ref 12–15.9)
HGB UR QL STRIP: NEGATIVE
IMM GRANULOCYTES # BLD AUTO: 0.01 10*3/MM3 (ref 0–0.05)
IMM GRANULOCYTES NFR BLD AUTO: 0.2 % (ref 0–0.5)
KETONES UR QL STRIP: NEGATIVE
LDLC SERPL CALC-MCNC: 148 MG/DL (ref 0–100)
LEUKOCYTE ESTERASE UR QL STRIP: NEGATIVE
LYMPHOCYTES # BLD AUTO: 2.05 10*3/MM3 (ref 0.7–3.1)
LYMPHOCYTES NFR BLD AUTO: 39.5 % (ref 19.6–45.3)
MCH RBC QN AUTO: 28.7 PG (ref 26.6–33)
MCHC RBC AUTO-ENTMCNC: 32.7 G/DL (ref 31.5–35.7)
MCV RBC AUTO: 87.8 FL (ref 79–97)
MICRO URNS: NORMAL
MICRO URNS: NORMAL
MICROALBUMIN UR-MCNC: <3 UG/ML
MONOCYTES # BLD AUTO: 0.46 10*3/MM3 (ref 0.1–0.9)
MONOCYTES NFR BLD AUTO: 8.9 % (ref 5–12)
NEUTROPHILS # BLD AUTO: 2.59 10*3/MM3 (ref 1.7–7)
NEUTROPHILS NFR BLD AUTO: 49.8 % (ref 42.7–76)
NITRITE UR QL STRIP: NEGATIVE
NRBC BLD AUTO-RTO: 0 /100 WBC (ref 0–0.2)
PH UR STRIP: 6.5 [PH] (ref 5–7.5)
PLATELET # BLD AUTO: 173 10*3/MM3 (ref 140–450)
POTASSIUM SERPL-SCNC: 4.2 MMOL/L (ref 3.5–5.2)
PROT SERPL-MCNC: 6.6 G/DL (ref 6–8.5)
PROT UR QL STRIP: NEGATIVE
RBC # BLD AUTO: 4.74 10*6/MM3 (ref 3.77–5.28)
RBC #/AREA URNS HPF: NORMAL /HPF (ref 0–2)
SODIUM SERPL-SCNC: 143 MMOL/L (ref 136–145)
SP GR UR STRIP: 1.01 (ref 1–1.03)
TRIGL SERPL-MCNC: 121 MG/DL (ref 0–150)
URINALYSIS REFLEX: NORMAL
UROBILINOGEN UR STRIP-MCNC: 0.2 MG/DL (ref 0.2–1)
VLDLC SERPL CALC-MCNC: 22 MG/DL (ref 5–40)
WBC # BLD AUTO: 5.19 10*3/MM3 (ref 3.4–10.8)
WBC #/AREA URNS HPF: NORMAL /HPF (ref 0–5)

## 2023-01-19 ENCOUNTER — OFFICE VISIT (OUTPATIENT)
Dept: INTERNAL MEDICINE | Facility: CLINIC | Age: 68
End: 2023-01-19
Payer: MEDICARE

## 2023-01-19 VITALS
TEMPERATURE: 98.1 F | HEART RATE: 61 BPM | HEIGHT: 67 IN | BODY MASS INDEX: 29.24 KG/M2 | DIASTOLIC BLOOD PRESSURE: 62 MMHG | OXYGEN SATURATION: 97 % | WEIGHT: 186.3 LBS | SYSTOLIC BLOOD PRESSURE: 128 MMHG

## 2023-01-19 DIAGNOSIS — M85.859 OSTEOPENIA OF HIP, UNSPECIFIED LATERALITY: ICD-10-CM

## 2023-01-19 DIAGNOSIS — M70.61 TROCHANTERIC BURSITIS OF BOTH HIPS: ICD-10-CM

## 2023-01-19 DIAGNOSIS — M70.62 TROCHANTERIC BURSITIS OF BOTH HIPS: ICD-10-CM

## 2023-01-19 DIAGNOSIS — Z00.00 ENCOUNTER FOR SUBSEQUENT ANNUAL WELLNESS VISIT (AWV) IN MEDICARE PATIENT: ICD-10-CM

## 2023-01-19 DIAGNOSIS — F43.22 ADJUSTMENT DISORDER WITH ANXIOUS MOOD: ICD-10-CM

## 2023-01-19 DIAGNOSIS — M17.0 PRIMARY OSTEOARTHRITIS OF BOTH KNEES: ICD-10-CM

## 2023-01-19 DIAGNOSIS — K21.9 GASTROESOPHAGEAL REFLUX DISEASE WITHOUT ESOPHAGITIS: ICD-10-CM

## 2023-01-19 DIAGNOSIS — M19.042 PRIMARY OSTEOARTHRITIS OF BOTH HANDS: ICD-10-CM

## 2023-01-19 DIAGNOSIS — R73.01 IFG (IMPAIRED FASTING GLUCOSE): ICD-10-CM

## 2023-01-19 DIAGNOSIS — M19.041 PRIMARY OSTEOARTHRITIS OF BOTH HANDS: ICD-10-CM

## 2023-01-19 DIAGNOSIS — M50.90 DISC DISORDER OF CERVICAL REGION: ICD-10-CM

## 2023-01-19 DIAGNOSIS — Z00.01 ENCOUNTER FOR GENERAL ADULT MEDICAL EXAMINATION WITH ABNORMAL FINDINGS: Primary | ICD-10-CM

## 2023-01-19 DIAGNOSIS — K22.4 ESOPHAGEAL DYSMOTILITY: ICD-10-CM

## 2023-01-19 DIAGNOSIS — G25.0 ESSENTIAL TREMOR: ICD-10-CM

## 2023-01-19 DIAGNOSIS — E78.5 DYSLIPIDEMIA: ICD-10-CM

## 2023-01-19 PROBLEM — R51.9 NEW ONSET HEADACHE: Status: RESOLVED | Noted: 2022-04-27 | Resolved: 2023-01-19

## 2023-01-19 PROCEDURE — G0439 PPPS, SUBSEQ VISIT: HCPCS | Performed by: INTERNAL MEDICINE

## 2023-01-19 PROCEDURE — 1170F FXNL STATUS ASSESSED: CPT | Performed by: INTERNAL MEDICINE

## 2023-01-19 PROCEDURE — 99397 PER PM REEVAL EST PAT 65+ YR: CPT | Performed by: INTERNAL MEDICINE

## 2023-01-19 PROCEDURE — 1159F MED LIST DOCD IN RCRD: CPT | Performed by: INTERNAL MEDICINE

## 2023-01-19 NOTE — PATIENT INSTRUCTIONS
Medicare Wellness  Personal Prevention Plan of Service     Date of Office Visit:    Encounter Provider:  Levon Barber MD  Place of Service:  [facility]  Patient Name: Noemi Fowler  :  1955    As part of the Medicare Wellness portion of your visit today, we are providing you with this personalized preventive plan of services (PPPS). This plan is based upon recommendations of the [place] Preventive Services Task Force (USPSTF) and the Advisory Committee on Immunization Practices (ACIP).    This lists the preventive care services that should be considered, and provides dates of when you are due. Items listed as completed are up-to-date and do not require any further intervention.    Health Maintenance   Topic Date Due    DXA SCAN  05/10/2023    ANNUAL WELLNESS VISIT  2024    MAMMOGRAM  2024    PAP SMEAR  2024    TDAP/TD VACCINES (3 - Td or Tdap) 2025    COLORECTAL CANCER SCREENING  2030    HEPATITIS C SCREENING  Completed    COVID-19 Vaccine  Completed    INFLUENZA VACCINE  Completed    Pneumococcal Vaccine 65+  Completed    ZOSTER VACCINE  Completed       No orders of the defined types were placed in this encounter.      Return in about 6 months (around 2023) for Recheck.

## 2023-01-19 NOTE — PROGRESS NOTES
"Subjective     Chief Complaint   Patient presents with   • Medicare Wellness-subsequent     Review of Medical Issues       HPI:  Noemi Fowler is a 67 y.o. female RTC in yearly CPE/ AWV, review of medical issues:  1. LESLI noted after acute bowel cahnge issues ~10/2022 - bowels are 'OK'.  Resolved rapidly by time got on trip and had no issues.  No bleeding noted. Recalls now in hindsight that had given 'double red cells' one month prior. NO issues since last visit.   2. Adjustment D/O with panic event and new onset HA - overall much better with work with counselor. \"It is improving. IT is not where I want it to be but it is improving'. Referring to situation.  Issues 'all have to do with my son'.  Feels like her mood mirrors the situation. \"I dont stay down, I am in a much better place'.   --> Insomnia, adjustment - had been much better now on low dose TCA.  Is off med now. Sleep is better overall. Some interruptions caring for mother 2-3 days/ week.   3. Overweight/ IFG - active, but no exercise. 'I am improving. I am still not good with vegetables but I do the fruits'.  Walking [place] now with  and likes that as exercise.   4. Dyslipidemia -  LDL markedly progressive on last check. Is really  'watching waht I am eating more and exercisign more'. \"It is going to go down more\".   5. Essential Tremor - dx'd in 2015, after (-) DMAIAN scan, mild progression. \"It is definitely there'.  Is 'a little' worse.  L hand is really the issue. Sees Dr. Wilhelm annually.   7. OA, in hands and knees/ shoulders - minimal sx, is not limtiing. 'I still function'.  Really not limiting.  Uses Voltaren gel PRN and rare Tylenol.    8. Trochanteric bursitis B - s/p eval at ortho with injx, temporary response. Occasionally uses Voltaren gel now.  Shoulders Mild sx, declines additional eval at this time.   9.  - saw Gyn in 9/2022 and had pelvix only and Mammo    The following portions of the patient's history were reviewed and " "updated as appropriate: allergies, current medications, past family history, past medical history, past social history, past surgical history and problem list.    Review of Systems   Constitutional: Negative for chills, fever, malaise/fatigue, weight gain and weight loss.   HENT: Negative for congestion, hearing loss, odynophagia and sore throat.    Eyes: Negative for discharge, double vision, pain and redness.        Last eye exam ~12/2022  No pressure issues noted     Cardiovascular: Negative for chest pain, dyspnea on exertion, irregular heartbeat, leg swelling, near-syncope, palpitations and syncope.   Respiratory: Negative for cough and shortness of breath.    Endocrine: Negative for polydipsia, polyphagia and polyuria.   Hematologic/Lymphatic: Negative for bleeding problem. Does not bruise/bleed easily.   Skin: Negative for rash and suspicious lesions.        Saw derm 1/2023, no issues.      Musculoskeletal: Positive for arthritis and joint pain (hands and hips.  \"Not debilitating'. ). Negative for joint swelling, muscle cramps and muscle weakness.   Gastrointestinal: Negative for constipation, diarrhea, dysphagia, heartburn, hematochezia, melena, nausea and vomiting.   Genitourinary: Negative for dysuria, frequency, hematuria, hesitancy and incomplete emptying.   Neurological: Negative for dizziness, headaches and light-headedness.   Psychiatric/Behavioral: Negative for depression. The patient has insomnia (variable). The patient is not nervous/anxious.    Allergic/Immunologic: Negative for environmental allergies and persistent infections.       Patient Care Team:  Levon Barber MD as PCP - General  Levon Barber MD as PCP - Family Medicine    Recent Hospitalizations: No recent hospitalization(s).    Depression Screen:   PHQ-2/PHQ-9 Depression Screening 1/19/2023   Retired PHQ-9 Total Score -   Retired Total Score -   Little Interest or Pleasure in Doing Things 0-->not at all   Feeling Down, Depressed or " "Hopeless 0-->not at all   PHQ-9: Brief Depression Severity Measure Score 0       Functional and Cognitive Screening:  Functional & Cognitive Status 1/19/2023   Do you have difficulty preparing food and eating? No   Do you have difficulty bathing yourself, getting dressed or grooming yourself? No   Do you have difficulty using the toilet? No   Do you have difficulty moving around from place to place? No   Do you have trouble with steps or getting out of a bed or a chair? No   Current Diet Limited Junk Food   Dental Exam Up to date   Eye Exam Up to date   Exercise (times per week) 3 times per week   Current Exercises Include Walking   Current Exercise Activities Include -   Do you need help using the phone?  No   Are you deaf or do you have serious difficulty hearing?  No   Do you need help with transportation? No   Do you need help shopping? No   Do you need help preparing meals?  No   Do you need help with housework?  No   Do you need help with laundry? No   Do you need help taking your medications? No   Do you need help managing money? No   Do you ever drive or ride in a car without wearing a seat belt? No   Have you felt unusual stress, anger or loneliness in the last month? No   Who do you live with? Spouse   If you need help, do you have trouble finding someone available to you? No   Have you been bothered in the last four weeks by sexual problems? No   Do you have difficulty concentrating, remembering or making decisions? No       Does the patient have evidence of cognitive impairment? no    Does not need ASA (and currently is not on it)    Vitals:    01/19/23 1456 01/19/23 1523   BP: 142/74 128/62   BP Location: Left arm Left arm   Patient Position: Sitting Sitting   Cuff Size: Adult Adult   Pulse: 61    Temp: 98.1 °F (36.7 °C)    TempSrc: Oral    SpO2: 97%    Weight: 84.5 kg (186 lb 4.8 oz)    Height: 170.2 cm (67\")        Body mass index is 29.18 kg/m².    Visual Acuity:  No results found.    Advanced Care " Planning:  ACP discussion was held with the patient during this visit. Patient has an advance directive in EMR which is still valid.     Objective   Physical Exam  Vitals reviewed.   Constitutional:       General: She is not in acute distress.     Appearance: Normal appearance. She is well-developed. She is not ill-appearing or toxic-appearing.   HENT:      Head: Normocephalic and atraumatic.      Right Ear: Hearing, tympanic membrane, ear canal and external ear normal. There is no impacted cerumen.      Left Ear: Hearing, tympanic membrane, ear canal and external ear normal. There is no impacted cerumen.      Nose: Nose normal.      Mouth/Throat:      Mouth: Mucous membranes are moist. No injury or oral lesions.      Dentition: Abnormal dentition (erosions in teeth, bite guards in place). Does not have dentures.      Tongue: No lesions.      Pharynx: Oropharynx is clear. Uvula midline. No pharyngeal swelling, oropharyngeal exudate, posterior oropharyngeal erythema or uvula swelling.   Eyes:      General: Lids are normal. No scleral icterus.        Right eye: No discharge.         Left eye: No discharge.      Extraocular Movements: Extraocular movements intact.      Conjunctiva/sclera: Conjunctivae normal.      Pupils: Pupils are equal, round, and reactive to light.   Neck:      Thyroid: No thyroid mass or thyromegaly.      Vascular: No carotid bruit.      Trachea: Trachea normal.   Cardiovascular:      Rate and Rhythm: Normal rate and regular rhythm. Occasional extrasystoles are present.     Pulses:           Radial pulses are 2+ on the right side and 2+ on the left side.        Dorsalis pedis pulses are 2+ on the right side and 2+ on the left side.        Posterior tibial pulses are 2+ on the right side and 2+ on the left side.      Heart sounds: Normal heart sounds, S1 normal and S2 normal. No murmur heard.    No friction rub. No gallop.   Pulmonary:      Effort: Pulmonary effort is normal. No respiratory  distress.      Breath sounds: Normal breath sounds. No wheezing, rhonchi or rales.   Abdominal:      General: Bowel sounds are normal. There is no distension.      Palpations: Abdomen is soft. There is no mass.      Tenderness: There is no abdominal tenderness. There is no guarding or rebound.   Musculoskeletal:         General: Normal range of motion.      Cervical back: Full passive range of motion without pain. No rigidity. No muscular tenderness. Normal range of motion.      Right lower leg: No edema.      Left lower leg: No edema.      Right foot: Normal range of motion. No deformity or bunion.      Left foot: Normal range of motion. No deformity or bunion.   Feet:      Right foot:      Skin integrity: No ulcer, blister or skin breakdown.      Left foot:      Skin integrity: No ulcer, blister or skin breakdown.   Lymphadenopathy:      Cervical: No cervical adenopathy.      Upper Body:      Right upper body: No supraclavicular, axillary or pectoral adenopathy.      Left upper body: No supraclavicular, axillary or pectoral adenopathy.      Lower Body: No right inguinal adenopathy. No left inguinal adenopathy.   Skin:     General: Skin is warm and dry.      Findings: No rash.      Comments: Scattered SK's on back     Neurological:      Mental Status: She is alert and oriented to person, place, and time.      Cranial Nerves: No cranial nerve deficit.      Sensory: No sensory deficit.      Motor: Tremor (mild, action, low amp/high frequency L > R hand) present. No weakness, atrophy or abnormal muscle tone.      Gait: Gait normal.      Deep Tendon Reflexes: Reflexes are normal and symmetric.      Reflex Scores:       Patellar reflexes are 2+ on the right side and 2+ on the left side.       Achilles reflexes are 2+ on the right side and 2+ on the left side.  Psychiatric:         Mood and Affect: Mood normal.         Behavior: Behavior normal.         Thought Content: Thought content normal.         Compared to one  year ago, the patient feels physical health is the same.  Compared to one year ago, the patient feels mental health is better.    Reviewed chart for potential of high risk medication in the elderly: yes  Reviewed chart for potential of harmful drug interactions in the elderly:yes    Identification of Risk Factors:  Risk factors include: Breast Cancer/Mammogram Screening  Chronic Pain   Immunizations Discussed/Encouraged (specific immunizations; Tdap, Hepatitis A Vaccine/Series, Influenza, Pneumococcal 23, Shingrix and COVID19 ).    Patient Self-Management and Personalized Health Advice  The patient has been provided with information about: diet, exercise, weight management and mental health concerns and preventive services including:   · Annual Wellness Visit (AWV)  · Bone Density Measurements  · Colorectal Cancer Screening, Colonoscopy  · Screening Mammography   · Screening Pap Tests.    Discussed the patient's BMI with her. The BMI is above average; BMI management plan is completed.    Assessment & Plan   Diagnoses and all orders for this visit:    1. Encounter for general adult medical examination with abnormal findings (Primary)    2. Encounter for subsequent annual wellness visit (AWV) in Medicare patient    3. Trochanteric bursitis of both hips    4. Osteopenia of hip, unspecified laterality    5. Primary osteoarthritis of both knees    6. Primary osteoarthritis of both hands    7. IFG (impaired fasting glucose)    8. Gastroesophageal reflux disease without esophagitis    9. Essential tremor    10. Esophageal dysmotility    11. Dyslipidemia    12. Adjustment disorder with anxious mood    13. Disc disorder of cervical region            Diagnoses and all orders for this visit:    Encounter for general adult medical examination with abnormal findings    Encounter for subsequent annual wellness visit (AWV) in Medicare patient    Trochanteric bursitis of both hips    Osteopenia of hip, unspecified  laterality    Primary osteoarthritis of both knees    Primary osteoarthritis of both hands    IFG (impaired fasting glucose)    Gastroesophageal reflux disease without esophagitis    Essential tremor    Esophageal dysmotility    Dyslipidemia    Adjustment disorder with anxious mood    Disc disorder of cervical region    Other orders  -     IRON, FERROUS SULFATE, PO  -     Ascorbic Acid (VITAMIN C PO); Take 1 tablet by mouth Daily. 50 mg        Noemi LIZET Fowler is a 67 y.o. female RTC in yearly CPE/ AWV, review of medical issues:  1. LESLI noted after acute bowel change issues ~10/2022 - bowels resolved after last visit, no recurrence.  CBC back to baseline again today, MCV recovered.  FOBT (-) x 2. ?? Etiology was 'double red cell donations' at [facility] prior to GI event presnetation in office.  At this time, OK to D/C oral iron.  Advised oral iron ~2 weeks around time of any blood donations.  Trend.   2. Adjustment D/O with panic event and new onset HA - overall much better with work with counselor/ family therapy.  Exercising regularly. C/W same.   --> Insomnia, adjustment - responded to low dose TCA, now off.  C/W therapy and TLC mods.   3. Overweight/ IFG - A1C remains normal, mild IFG noted.  C/W TLC mods, will trend.   4. Dyslipidemia -  LDL markedly progressive on last check to 212.  TLC mods only with improved to 148, congratulated on efforts.   Hold any statin addition and will trend numbes with continued TLC.   5. Abdominal bloating/ fullness, nausea s/p lap cristiano and appy; subsequent mild gastritis and Schatzki's ring/ spastic esophagus on EGD in 2017- S/P eval with Dr. Arredondo 11/2021.  Pt did not take Abx for SIBO, sx resolved off large volume popcorn intake. Monitor for any recurrence. C/W PPI daily.   6. Essential Tremor, L > R - dx'd in 2015, after (-) DAMIAN scan, mild progression. Sees Dr. Wilhelm in neuro annually.  Declines meds at this time.   7. Neuropathy, tingling on whole L side of body - s/p  extensive w/u with Neurology, unclear etiology. Remains off Gabapentin for lack of benefit. Following annually with neuro.   8. Cervical spine DJD/ DDD s/p fusion at C5-C6 and s/p epidurals in past - CHAR.    9. OA, in hands and knees/ shoulders - minimal sx, not limtiing. Voltaren gel PRN and rare Tylenol.   10. Urge Incontinence - wearing pad.  Issue deferred today. U/A clear.   11. Trochanteric bursitis B - s/p eval at ortho with injx, temporary response. Voltaren gel PRN only now.    12. HM - labs d/w pt; Flu/ Tdap/ Hep A/ Zostavax/ Prevnar/ Pvax/ Shingrix/ COVID - UTD; C-scope 12/2015 (-) -->C-scope 7/2020 (1 hyperplastic polyp); Pap UTD 9/2021;  Mammo UTD 9/2022 with Gyn; DEXA normal 7/2016 per Gyn --> mild osteopenia hip 5/2021 --> DEXA due 5/2023; Hep C Ab (-) 2014; AAA (-) on 2015 CT A/P and U/S in 10/2019; c/w exercise; Preventative care plan provided to pt at end of visit     Return in about 6 months (around 7/19/2023) for Recheck. (CMP, A1C, LDL, iron panel/ ferritin, CBC)          Current Outpatient Medications:   •  Ascorbic Acid (VITAMIN C PO), Take 1 tablet by mouth Daily. 50 mg, Disp: , Rfl:   •  Calcium Carb-Cholecalciferol (CALCIUM + D3) 600-200 MG-UNIT tablet, Take  by mouth., Disp: , Rfl:   •  Cholecalciferol (VITAMIN D3 PO), Take 2,000 Units by mouth Daily., Disp: , Rfl:   •  IRON, FERROUS SULFATE, PO, , Disp: , Rfl:   •  Misc Natural Products (GLUCOS-CHONDROIT-MSM COMPLEX) tablet, Take  by mouth., Disp: , Rfl:   •  omeprazole (priLOSEC) 40 MG capsule, TAKE 1 CAPSULE BY MOUTH EVERY DAY, Disp: 90 capsule, Rfl: 3

## 2023-08-09 ENCOUNTER — OFFICE VISIT (OUTPATIENT)
Dept: INTERNAL MEDICINE | Facility: CLINIC | Age: 68
End: 2023-08-09
Payer: MEDICARE

## 2023-08-09 VITALS
BODY MASS INDEX: 28.31 KG/M2 | OXYGEN SATURATION: 97 % | SYSTOLIC BLOOD PRESSURE: 143 MMHG | HEIGHT: 67 IN | TEMPERATURE: 100.6 F | HEART RATE: 94 BPM | WEIGHT: 180.4 LBS | DIASTOLIC BLOOD PRESSURE: 79 MMHG

## 2023-08-09 DIAGNOSIS — J06.9 VIRAL UPPER RESPIRATORY TRACT INFECTION: Primary | ICD-10-CM

## 2023-08-09 DIAGNOSIS — J02.9 SORE THROAT: ICD-10-CM

## 2023-08-09 LAB
EXPIRATION DATE: NORMAL
INTERNAL CONTROL: NORMAL
Lab: NORMAL
SARS-COV-2 AG UPPER RESP QL IA.RAPID: NOT DETECTED

## 2023-08-09 PROCEDURE — 87426 SARSCOV CORONAVIRUS AG IA: CPT | Performed by: STUDENT IN AN ORGANIZED HEALTH CARE EDUCATION/TRAINING PROGRAM

## 2023-08-09 PROCEDURE — 99213 OFFICE O/P EST LOW 20 MIN: CPT | Performed by: STUDENT IN AN ORGANIZED HEALTH CARE EDUCATION/TRAINING PROGRAM

## 2023-08-09 NOTE — PROGRESS NOTES
Charbel Moore M.D.  Internal Medicine  [facility]  [address], [address]  [place] [address]  [phone]      Chief Complaint  Sore Throat, Fever, and Cough    SUBJECTIVE    History of Present Illness    Noemi Fowler is a 68 y.o. female adjustment d/o issues and progressive LDL/ hyperlipidemai who presents to the office today as an established patient of Dr Levon Barber MD here today for an acute care visit.     Her throat is scratchy, hurts to swallow since Monday. No lymphadenophy. Mild ough. She has fever. No chills. Negative for COVID at home. She went to the [employer] at the end of July and [facility] last week. She took cough syrup without relief. She is wondering if this is allergies.     She takes care of her 95-year-old mother and is concerned about getting her sick.    Review of Systems    No Known Allergies     Outpatient Medications Marked as Taking for the 8/9/23 encounter (Office Visit) with Charbel Moore MD   Medication Sig Dispense Refill    Ascorbic Acid (VITAMIN C PO) Take 1 tablet by mouth Daily. 50 mg      Calcium Carb-Cholecalciferol (CALCIUM + D3) 600-200 MG-UNIT tablet Take  by mouth.      Misc Natural Products (GLUCOS-CHONDROIT-MSM COMPLEX) tablet Take  by mouth.      omeprazole (priLOSEC) 40 MG capsule TAKE 1 CAPSULE BY MOUTH EVERY DAY 90 capsule 3        Past Medical History:   Diagnosis Date    Abnormal LFTs     Abnormal liver function tests 10/11/2016    Carotid artery plaque     lifeline screen only not noted on formal screen at [facility] 2014    Carotid atherosclerosis 10/11/2016    Description: Lif[facility] screen only; Not noted on formal screen at [facility] 2014    Cervical disc disorder, unspecified, unspecified cervical region     djd/ddd c5-c6 fusion epiderals    Essential tremor 10/17/2016    Gastroesophageal reflux disease without esophagitis 4/21/2017    With noted Schatzki ring and esophageal spasm in 2017 EGD    History of peripheral neuropathy      sees Dr. Mantilla sx only on L side    Liver hemangioma     on CT scan x 2 in 2015    Localized osteoarthritis of hand     Mucocele, appendix     Osteoarthritis, localized, knee     Osteopenia     noted on lifeline screen not seen on formal hip/L-spine DEXA in 11/2014    Pneumonia of right lower lobe due to infectious organism 3/20/2017    3/2017, resolved.     PONV (postoperative nausea and vomiting)     Post herpetic neuralgia     Postherpetic neuralgia 10/17/2016    Schatzki's ring 4/21/2017    On EGD 2017, s/p dilatation    Subclinical hypothyroidism 12/27/2019    New issue on 12/2019 labs    Urge incontinence of urine     Vertigo 12/2021    MRI brain negative.      Past Surgical History:   Procedure Laterality Date    APPENDECTOMY      11/11/15 due to mucocoele    CATARACT EXTRACTION, BILATERAL  2012    with lens replacement B    CERVICAL FUSION      ENDOSCOPY N/A 1/27/2017    Procedure: ESOPHAGOGASTRODUODENOSCOPY WITH DILATATION LISA 52 FR,WITH BIOPSY;  Surgeon: Kofi Arredondo MD;  Location: [facility] ENDOSCOPY;  Service:     EYE SURGERY      cataract surgery B 2012    GALLBLADDER SURGERY      02/2016    HYSTERECTOMY      pt has ovaries [facility]ION only    LASIK      bilateral    OSTECTOMY      navicular    SKIN BIOPSY      SOFT TISSUE CYST EXCISION       Family History   Problem Relation Age of Onset    Hypertension Mother     Hypothyroidism Mother     Dementia Mother     Leukemia Father         chronic lymphocytic    Hypertension Father     Breast cancer Sister     Hypertension Sister     Stroke Sister     Alcohol abuse Sister         with cirrhosis, x 1 sister    Depression Brother     Hypertension Brother     Colon cancer Maternal Grandmother     Cancer Maternal Grandmother     No Known Problems Son     reports that she has quit smoking. She has never used smokeless tobacco. She reports current alcohol use. She reports that she does not use drugs.    OBJECTIVE    Vital Signs:   /79   Pulse 94   Temp (!)  "100.6 øF (38.1 øC)   Ht 170.2 cm (67.01\")   Wt 81.8 kg (180 lb 6.4 oz)   SpO2 97%   BMI 28.25 kg/mý     Physical Exam  Constitutional:       Appearance: Normal appearance. She is normal weight.   HENT:      Right Ear: Tympanic membrane normal.      Left Ear: Tympanic membrane normal.      Mouth/Throat:      Mouth: Mucous membranes are moist.      Pharynx: Posterior oropharyngeal erythema present. No oropharyngeal exudate.   Cardiovascular:      Rate and Rhythm: Normal rate and regular rhythm.      Heart sounds: Normal heart sounds. No murmur heard.  Pulmonary:      Effort: Pulmonary effort is normal.      Breath sounds: Normal breath sounds.   Lymphadenopathy:      Cervical: No cervical adenopathy.   Skin:     General: Skin is warm and dry.   Neurological:      Mental Status: She is alert.   Psychiatric:         Mood and Affect: Mood normal.         Behavior: Behavior normal.         Thought Content: Thought content normal.          The following data was reviewed by: Charbel Moore MD on 08/09/2023:  Common labs          10/3/2022    09:48 11/16/2022    08:22 1/12/2023    08:14   Common Labs   Glucose   104    BUN   13    Creatinine   0.91    Sodium   143    Potassium   4.2    Chloride   105    Calcium   9.6    Total Protein   6.6    Albumin   4.4    Total Bilirubin   0.5    Alkaline Phosphatase   91    AST (SGOT)   19    ALT (SGPT)   21    WBC 5.53  5.75  5.19    Hemoglobin 10.8  13.5  13.6    Hematocrit 34.0  42.5  41.6    Platelets 184  178  173    Total Cholesterol   209    Triglycerides   121    HDL Cholesterol   39    LDL Cholesterol    148    Hemoglobin A1C   5.50    Microalbumin, Urine   <3.0      Data reviewed : Previous PCP note              ASSESSMENT & PLAN     Diagnoses and all orders for this visit:    1. Viral upper respiratory tract infection (Primary)    2. Sore throat  -     POCT SARS-CoV-2 Antigen BERNARDO      Patient was negative for COVID today.  I suspect viral URI.    Centor score is 0.  Given " age, presence of cough, absence of lymphadenopathy, presence of fever.  Patient is okay with foregoing strep testing.    Discussed diagnosis and treatment of URI.  Discussed the importance of avoiding unnecessary antibiotic therapy.  Suggested symptomatic OTC remedies.  I encouraged her to wash her hands frequently and wear a mask around immunocompromised people if she cannot avoid contact altogether.  Follow up as needed.             Health Maintenance Due   Topic Date Due    COVID-19 Vaccine (6 - Pfizer series) 03/23/2023    DXA SCAN  05/10/2023        Follow Up  No follow-ups on file.    Patient/family had no further questions at this time and verbalized understanding of the plan discussed today.

## 2023-08-16 DIAGNOSIS — D64.9 ANEMIA, UNSPECIFIED TYPE: ICD-10-CM

## 2023-08-16 DIAGNOSIS — D50.9 IRON DEFICIENCY ANEMIA, UNSPECIFIED IRON DEFICIENCY ANEMIA TYPE: ICD-10-CM

## 2023-08-16 DIAGNOSIS — E78.5 DYSLIPIDEMIA: Primary | ICD-10-CM

## 2023-08-16 DIAGNOSIS — R73.01 IFG (IMPAIRED FASTING GLUCOSE): ICD-10-CM

## 2023-08-22 LAB
ALBUMIN SERPL-MCNC: 4.7 G/DL (ref 3.5–5.2)
ALBUMIN/GLOB SERPL: 2.9 G/DL
ALP SERPL-CCNC: 97 U/L (ref 39–117)
ALT SERPL-CCNC: 23 U/L (ref 1–33)
AST SERPL-CCNC: 19 U/L (ref 1–32)
BASOPHILS # BLD AUTO: 0.02 10*3/MM3 (ref 0–0.2)
BASOPHILS NFR BLD AUTO: 0.3 % (ref 0–1.5)
BILIRUB SERPL-MCNC: 0.3 MG/DL (ref 0–1.2)
BUN SERPL-MCNC: 10 MG/DL (ref 8–23)
BUN/CREAT SERPL: 13 (ref 7–25)
CALCIUM SERPL-MCNC: 9.2 MG/DL (ref 8.6–10.5)
CHLORIDE SERPL-SCNC: 108 MMOL/L (ref 98–107)
CO2 SERPL-SCNC: 28 MMOL/L (ref 22–29)
CREAT SERPL-MCNC: 0.77 MG/DL (ref 0.57–1)
EGFRCR SERPLBLD CKD-EPI 2021: 83.6 ML/MIN/1.73
EOSINOPHIL # BLD AUTO: 0.04 10*3/MM3 (ref 0–0.4)
EOSINOPHIL NFR BLD AUTO: 0.7 % (ref 0.3–6.2)
ERYTHROCYTE [DISTWIDTH] IN BLOOD BY AUTOMATED COUNT: 12.8 % (ref 12.3–15.4)
FERRITIN SERPL-MCNC: 95.8 NG/ML (ref 13–150)
GLOBULIN SER CALC-MCNC: 1.6 GM/DL
GLUCOSE SERPL-MCNC: 99 MG/DL (ref 65–99)
HBA1C MFR BLD: 5.4 % (ref 4.8–5.6)
HCT VFR BLD AUTO: 41.5 % (ref 34–46.6)
HGB BLD-MCNC: 14.2 G/DL (ref 12–15.9)
IMM GRANULOCYTES # BLD AUTO: 0.02 10*3/MM3 (ref 0–0.05)
IMM GRANULOCYTES NFR BLD AUTO: 0.3 % (ref 0–0.5)
IRON SATN MFR SERPL: 18 % (ref 20–50)
IRON SERPL-MCNC: 68 MCG/DL (ref 37–145)
LDLC SERPL DIRECT ASSAY-MCNC: 162 MG/DL (ref 0–100)
LYMPHOCYTES # BLD AUTO: 1.99 10*3/MM3 (ref 0.7–3.1)
LYMPHOCYTES NFR BLD AUTO: 33.2 % (ref 19.6–45.3)
MCH RBC QN AUTO: 30.2 PG (ref 26.6–33)
MCHC RBC AUTO-ENTMCNC: 34.2 G/DL (ref 31.5–35.7)
MCV RBC AUTO: 88.3 FL (ref 79–97)
MONOCYTES # BLD AUTO: 0.53 10*3/MM3 (ref 0.1–0.9)
MONOCYTES NFR BLD AUTO: 8.8 % (ref 5–12)
NEUTROPHILS # BLD AUTO: 3.4 10*3/MM3 (ref 1.7–7)
NEUTROPHILS NFR BLD AUTO: 56.7 % (ref 42.7–76)
NRBC BLD AUTO-RTO: 0 /100 WBC (ref 0–0.2)
PLATELET # BLD AUTO: 195 10*3/MM3 (ref 140–450)
POTASSIUM SERPL-SCNC: 4.7 MMOL/L (ref 3.5–5.2)
PROT SERPL-MCNC: 6.3 G/DL (ref 6–8.5)
RBC # BLD AUTO: 4.7 10*6/MM3 (ref 3.77–5.28)
SODIUM SERPL-SCNC: 145 MMOL/L (ref 136–145)
TIBC SERPL-MCNC: 387 MCG/DL
UIBC SERPL-MCNC: 319 MCG/DL (ref 112–346)
WBC # BLD AUTO: 6 10*3/MM3 (ref 3.4–10.8)

## 2023-08-28 ENCOUNTER — OFFICE VISIT (OUTPATIENT)
Dept: INTERNAL MEDICINE | Facility: CLINIC | Age: 68
End: 2023-08-28
Payer: MEDICARE

## 2023-08-28 VITALS
TEMPERATURE: 98.6 F | BODY MASS INDEX: 28.74 KG/M2 | SYSTOLIC BLOOD PRESSURE: 120 MMHG | DIASTOLIC BLOOD PRESSURE: 76 MMHG | HEIGHT: 67 IN | WEIGHT: 183.1 LBS | HEART RATE: 61 BPM | OXYGEN SATURATION: 97 %

## 2023-08-28 DIAGNOSIS — I65.23 CAROTID ARTERY PLAQUE, BILATERAL: ICD-10-CM

## 2023-08-28 DIAGNOSIS — F43.22 ADJUSTMENT DISORDER WITH ANXIOUS MOOD: ICD-10-CM

## 2023-08-28 DIAGNOSIS — Z86.2 HISTORY OF IRON DEFICIENCY ANEMIA: ICD-10-CM

## 2023-08-28 DIAGNOSIS — R73.01 IFG (IMPAIRED FASTING GLUCOSE): ICD-10-CM

## 2023-08-28 DIAGNOSIS — E78.5 DYSLIPIDEMIA: Primary | ICD-10-CM

## 2023-08-28 PROCEDURE — 99214 OFFICE O/P EST MOD 30 MIN: CPT | Performed by: INTERNAL MEDICINE

## 2023-08-28 PROCEDURE — 1160F RVW MEDS BY RX/DR IN RCRD: CPT | Performed by: INTERNAL MEDICINE

## 2023-08-28 PROCEDURE — 1159F MED LIST DOCD IN RCRD: CPT | Performed by: INTERNAL MEDICINE

## 2023-08-28 RX ORDER — ROSUVASTATIN CALCIUM 10 MG/1
10 TABLET, COATED ORAL DAILY
Qty: 90 TABLET | Refills: 2 | Status: SHIPPED | OUTPATIENT
Start: 2023-08-28

## 2023-08-28 NOTE — PROGRESS NOTES
"IFG (6 month f/u) and dislipidemia      HPI  Noemi Fowler is a 68 y.o. female RTC In f/u:   Recent COVID event. Seen in office and COVID test was negative. Later that day found out exposure to COVID and then pt retested and was (+) next day.  Fever resolved and sx really resolved fully.  Sore throat was main issues, no resolved. 'I think I am fine'.     1. LESLI noted after acute bowel change issues ~10/2022 - bowels resolved after last visit, no recurrence.  CBC back to baseline again today, MCV recovered.  FOBT (-) x 2. ?? Etiology was 'double red cell donations' at [facility] prior to GI event presnetation in office.  Off oral iron at this point.  Plans to donate upcoming.  Plans to sure iron around time of donation.   2. Adjustment D/O with panic event and new onset HA - overall much better with work with counselor/ family therapy.  Still has some issues. 'Still with highs and lows'.  Focused really on stress and conflict with son.  \"I can't figure him out\".  Still dealing with some stress there. No longer in family therapy. Overall 'happier'.   3. Overweight/ IFG -weight is down a bit on year, intentional.  'Working a lot'.  Is very active. Diet overall OK, 'could be better. Still not enough vegetables'   4. Dyslipidemia -  LDL markedly progressive on last check to 212 in past. Pt is still mindful but admits diet not as good of late.   5. Essential Tremor, L > R - dx'd in 2015, after (-) DAMIAN scan, mild progression.  Still does not feel like needs to be on meds.    Answers submitted by the patient for this visit:  Primary Reason for Visit (Submitted on 8/24/2023)  What is the primary reason for your visit?: Physical    Review of Systems   Constitutional: Positive for weight loss (intentional over the yeaer). Negative for chills and fever.   Cardiovascular:  Negative for chest pain and dyspnea on exertion.   Respiratory:  Negative for shortness of breath.    Musculoskeletal:  Positive for joint pain (aches and " "pains with hard work, variable day to day). Negative for myalgias.   Psychiatric/Behavioral:  Negative for altered mental status. The patient is nervous/anxious (variable, still with some family stressors).      The following portions of the patient's history were reviewed and updated as appropriate: allergies, current medications, past medical history, past social history, and problem list.      Current Outpatient Medications:     Ascorbic Acid (VITAMIN C PO), Take 1 tablet by mouth Daily. 50 mg, Disp: , Rfl:     Calcium Carb-Cholecalciferol (CALCIUM + D3) 600-200 MG-UNIT tablet, Take  by mouth., Disp: , Rfl:     Misc Natural Products (GLUCOS-CHONDROIT-MSM COMPLEX) tablet, Take  by mouth., Disp: , Rfl:     omeprazole (priLOSEC) 40 MG capsule, TAKE 1 CAPSULE BY MOUTH EVERY DAY, Disp: 90 capsule, Rfl: 3    rosuvastatin (Crestor) 10 MG tablet, Take 1 tablet by mouth Daily., Disp: 90 tablet, Rfl: 2    Vitals:    08/28/23 0941   BP: 120/76   BP Location: Left arm   Patient Position: Sitting   Cuff Size: Adult   Pulse: 61   Temp: 98.6 øF (37 øC)   TempSrc: Oral   SpO2: 97%   Weight: 83.1 kg (183 lb 1.6 oz)   Height: 170.2 cm (67.01\")     Body mass index is 28.67 kg/mý.      Physical Exam  Constitutional:       General: She is not in acute distress.     Appearance: Normal appearance. She is normal weight. She is not ill-appearing or toxic-appearing.   HENT:      Head: Normocephalic and atraumatic.   Eyes:      General: No scleral icterus.     Conjunctiva/sclera: Conjunctivae normal.      Pupils: Pupils are equal, round, and reactive to light.   Neck:      Vascular: No carotid bruit.   Cardiovascular:      Rate and Rhythm: Normal rate and regular rhythm.      Heart sounds: No murmur heard.    No friction rub. No gallop.   Pulmonary:      Effort: Pulmonary effort is normal. No respiratory distress.      Breath sounds: No wheezing, rhonchi or rales.   Musculoskeletal:      Cervical back: Normal range of motion and neck " supple. No tenderness.      Right lower leg: No edema.      Left lower leg: No edema.   Lymphadenopathy:      Cervical: No cervical adenopathy.   Neurological:      General: No focal deficit present.      Mental Status: She is alert.      Cranial Nerves: No cranial nerve deficit.      Gait: Gait normal.   Psychiatric:         Mood and Affect: Mood normal.         Behavior: Behavior normal.         Thought Content: Thought content normal.       Assessment/ Plan  Diagnoses and all orders for this visit:    Dyslipidemia  -     rosuvastatin (Crestor) 10 MG tablet; Take 1 tablet by mouth Daily.    Carotid artery plaque, bilateral  -     rosuvastatin (Crestor) 10 MG tablet; Take 1 tablet by mouth Daily.    IFG (impaired fasting glucose)    Adjustment disorder with anxious mood    History of iron deficiency anemia        Return in about 6 months (around 2/28/2024) for Medicare Wellness, Annual physical.      Discussion:  Noemi Fowler is a 68 y.o. female RTC In f/u:   1. COVID 8/2023 - (+) on home test.  Resolved sx on own.  CHAR today. Flu and COVID booster this Fall.   2. Hx of LESLI noted after acute bowel change issues ~10/2022 - bowels resolved after last visit, no recurrence.  CBC back to baseline again today, MCV recovered and iron remains replete on labs today off oral iron.  FOBT (-) x 2 in past. ?? Etiology was 'double red cell donations' at [facility] prior to GI event presentation in office.  Pt plans donation this year but will take iron supplements daily OTC around time of donation.   3. Adjustment D/O with panic event and new onset HA - overall much better with work with counselor/ family therapy.  Still has some issues with son but is overall 'happier' at this time.   4. Overweight/ IFG - resolved A1C and FBS on labs with noted intentional weight loss. TLC mods advised.   5. Dyslipidemia, noted issue of right ICA mild plaque and left ICA very mild plaque no stenosis on CT angio 4/2022 and MRI brain with  chronic small vessel ischemic changes -  LDL peak at 212, improved over time. However, d/w pt rationale for LDL lowering given pt has known vascular disease now.  Pt agreeable to start low dose statin, though suggested will likely need moderate dose over time to get > 50% reduction in LDL. C/W  TLC mods. Counseled on myalgia side effect and noted current baseline sx with day to day hard physical work. Trend lipids next labs.     RTC 6 months CPE/ AWV, F labs prior

## 2023-09-07 ENCOUNTER — TRANSCRIBE ORDERS (OUTPATIENT)
Dept: ADMINISTRATIVE | Facility: HOSPITAL | Age: 68
End: 2023-09-07
Payer: MEDICARE

## 2023-09-07 DIAGNOSIS — Z13.9 SCREENING DUE: Primary | ICD-10-CM

## 2023-10-04 DIAGNOSIS — K22.2 ESOPHAGEAL OBSTRUCTION: ICD-10-CM

## 2023-10-04 DIAGNOSIS — K21.9 GASTRO-ESOPHAGEAL REFLUX DISEASE WITHOUT ESOPHAGITIS: ICD-10-CM

## 2023-10-04 RX ORDER — OMEPRAZOLE 40 MG/1
CAPSULE, DELAYED RELEASE ORAL
Qty: 90 CAPSULE | Refills: 3 | Status: SHIPPED | OUTPATIENT
Start: 2023-10-04

## 2024-01-09 DIAGNOSIS — T46.905A: ICD-10-CM

## 2024-01-09 DIAGNOSIS — I65.23 CAROTID ARTERY PLAQUE, BILATERAL: Primary | ICD-10-CM

## 2024-01-09 DIAGNOSIS — R73.01 IFG (IMPAIRED FASTING GLUCOSE): ICD-10-CM

## 2024-01-10 ENCOUNTER — HOSPITAL ENCOUNTER (OUTPATIENT)
Dept: CARDIOLOGY | Facility: HOSPITAL | Age: 69
Discharge: HOME | End: 2024-01-10
Admitting: INTERNAL MEDICINE

## 2024-01-10 DIAGNOSIS — Z13.9 SCREENING DUE: ICD-10-CM

## 2024-01-10 LAB
BH CV ECHO MEAS - DIST AO DIAM: 1.51 CM
BH CV VAS BP LEFT ARM: NORMAL MMHG
BH CV VAS BP RIGHT ARM: NORMAL MMHG
BH CV VAS SCREENING CAROTID CCA LEFT: NORMAL CM/SEC
BH CV VAS SCREENING CAROTID CCA RIGHT: NORMAL CM/SEC
BH CV VAS SCREENING CAROTID ICA LEFT: NORMAL CM/SEC
BH CV VAS SCREENING CAROTID ICA RIGHT: NORMAL CM/SEC
BH CV XLRA MEAS - MID AO DIAM: 1.54 CM
BH CV XLRA MEAS - PAD LEFT ABI DP: 1.1
BH CV XLRA MEAS - PAD LEFT ABI PT: 1.12
BH CV XLRA MEAS - PAD LEFT ARM: 149 MMHG
BH CV XLRA MEAS - PAD LEFT LEG DP: 170 MMHG
BH CV XLRA MEAS - PAD LEFT LEG PT: 173 MMHG
BH CV XLRA MEAS - PAD RIGHT ABI DP: 1.04
BH CV XLRA MEAS - PAD RIGHT ABI PT: 1.06
BH CV XLRA MEAS - PAD RIGHT ARM: 154 MMHG
BH CV XLRA MEAS - PAD RIGHT LEG DP: 160 MMHG
BH CV XLRA MEAS - PAD RIGHT LEG PT: 164 MMHG
BH CV XLRA MEAS - PROX AO DIAM: 1.85 CM
BH CV XLRA MEAS LEFT ICA/CCA RATIO: 1.03
BH CV XLRA MEAS LEFT PROX ECA PSV: NORMAL CM/SEC
BH CV XLRA MEAS RIGHT ICA/CCA RATIO: 1.04
BH CV XLRA MEAS RIGHT PROX ECA PSV: NORMAL CM/SEC
MAXIMAL PREDICTED HEART RATE: 151 BPM
STRESS TARGET HR: 128 BPM

## 2024-01-10 PROCEDURE — 93799 UNLISTED CV SVC/PROCEDURE: CPT

## 2024-01-15 DIAGNOSIS — R73.01 IFG (IMPAIRED FASTING GLUCOSE): ICD-10-CM

## 2024-01-15 DIAGNOSIS — I10 HYPERTENSION, UNSPECIFIED TYPE: ICD-10-CM

## 2024-01-15 DIAGNOSIS — E78.5 DYSLIPIDEMIA: ICD-10-CM

## 2024-01-15 DIAGNOSIS — Z00.00 HEALTHCARE MAINTENANCE: Primary | ICD-10-CM

## 2024-01-19 LAB
ALBUMIN SERPL-MCNC: 4.6 G/DL (ref 3.5–5.2)
ALBUMIN/GLOB SERPL: 2.3 G/DL
ALP SERPL-CCNC: 102 U/L (ref 39–117)
ALT SERPL-CCNC: 74 U/L (ref 1–33)
APPEARANCE UR: CLEAR
AST SERPL-CCNC: 35 U/L (ref 1–32)
BACTERIA #/AREA URNS HPF: NORMAL /HPF
BASOPHILS # BLD AUTO: 0.03 10*3/MM3 (ref 0–0.2)
BASOPHILS NFR BLD AUTO: 0.4 % (ref 0–1.5)
BILIRUB SERPL-MCNC: 0.7 MG/DL (ref 0–1.2)
BILIRUB UR QL STRIP: NEGATIVE
BUN SERPL-MCNC: 15 MG/DL (ref 8–23)
BUN/CREAT SERPL: 18.1 (ref 7–25)
CALCIUM SERPL-MCNC: 9.4 MG/DL (ref 8.6–10.5)
CASTS URNS QL MICRO: NORMAL /LPF
CHLORIDE SERPL-SCNC: 106 MMOL/L (ref 98–107)
CHOLEST SERPL-MCNC: 191 MG/DL (ref 0–200)
CO2 SERPL-SCNC: 28.5 MMOL/L (ref 22–29)
COLOR UR: YELLOW
CREAT SERPL-MCNC: 0.83 MG/DL (ref 0.57–1)
EGFRCR SERPLBLD CKD-EPI 2021: 76.4 ML/MIN/1.73
EOSINOPHIL # BLD AUTO: 0.08 10*3/MM3 (ref 0–0.4)
EOSINOPHIL NFR BLD AUTO: 1.2 % (ref 0.3–6.2)
EPI CELLS #/AREA URNS HPF: NORMAL /HPF (ref 0–10)
ERYTHROCYTE [DISTWIDTH] IN BLOOD BY AUTOMATED COUNT: 12.8 % (ref 12.3–15.4)
GLOBULIN SER CALC-MCNC: 2 GM/DL
GLUCOSE SERPL-MCNC: 107 MG/DL (ref 65–99)
GLUCOSE UR QL STRIP: NEGATIVE
HBA1C MFR BLD: 5.4 % (ref 4.8–5.6)
HCT VFR BLD AUTO: 41.8 % (ref 34–46.6)
HDLC SERPL-MCNC: 50 MG/DL (ref 40–60)
HGB BLD-MCNC: 14.1 G/DL (ref 12–15.9)
HGB UR QL STRIP: NEGATIVE
IMM GRANULOCYTES # BLD AUTO: 0.01 10*3/MM3 (ref 0–0.05)
IMM GRANULOCYTES NFR BLD AUTO: 0.1 % (ref 0–0.5)
KETONES UR QL STRIP: NEGATIVE
LDLC SERPL CALC-MCNC: 129 MG/DL (ref 0–100)
LDLC/HDLC SERPL: 2.56 {RATIO}
LEUKOCYTE ESTERASE UR QL STRIP: NEGATIVE
LYMPHOCYTES # BLD AUTO: 1.79 10*3/MM3 (ref 0.7–3.1)
LYMPHOCYTES NFR BLD AUTO: 26.6 % (ref 19.6–45.3)
MCH RBC QN AUTO: 30.3 PG (ref 26.6–33)
MCHC RBC AUTO-ENTMCNC: 33.7 G/DL (ref 31.5–35.7)
MCV RBC AUTO: 89.9 FL (ref 79–97)
MICRO URNS: NORMAL
MICRO URNS: NORMAL
MONOCYTES # BLD AUTO: 0.49 10*3/MM3 (ref 0.1–0.9)
MONOCYTES NFR BLD AUTO: 7.3 % (ref 5–12)
NEUTROPHILS # BLD AUTO: 4.33 10*3/MM3 (ref 1.7–7)
NEUTROPHILS NFR BLD AUTO: 64.4 % (ref 42.7–76)
NITRITE UR QL STRIP: NEGATIVE
NRBC BLD AUTO-RTO: 0 /100 WBC (ref 0–0.2)
PH UR STRIP: 6 [PH] (ref 5–7.5)
PLATELET # BLD AUTO: 145 10*3/MM3 (ref 140–450)
POTASSIUM SERPL-SCNC: 4.7 MMOL/L (ref 3.5–5.2)
PROT SERPL-MCNC: 6.6 G/DL (ref 6–8.5)
PROT UR QL STRIP: NEGATIVE
RBC # BLD AUTO: 4.65 10*6/MM3 (ref 3.77–5.28)
RBC #/AREA URNS HPF: NORMAL /HPF (ref 0–2)
SODIUM SERPL-SCNC: 144 MMOL/L (ref 136–145)
SP GR UR STRIP: 1.01 (ref 1–1.03)
TRIGL SERPL-MCNC: 65 MG/DL (ref 0–150)
URINALYSIS REFLEX: NORMAL
UROBILINOGEN UR STRIP-MCNC: 0.2 MG/DL (ref 0.2–1)
VLDLC SERPL CALC-MCNC: 12 MG/DL (ref 5–40)
WBC # BLD AUTO: 6.73 10*3/MM3 (ref 3.4–10.8)
WBC #/AREA URNS HPF: NORMAL /HPF (ref 0–5)

## 2024-01-22 ENCOUNTER — OFFICE VISIT (OUTPATIENT)
Dept: INTERNAL MEDICINE | Facility: CLINIC | Age: 69
End: 2024-01-22
Payer: MEDICARE

## 2024-01-22 VITALS
BODY MASS INDEX: 25.74 KG/M2 | DIASTOLIC BLOOD PRESSURE: 68 MMHG | HEART RATE: 63 BPM | OXYGEN SATURATION: 98 % | TEMPERATURE: 98.1 F | SYSTOLIC BLOOD PRESSURE: 130 MMHG | WEIGHT: 164.4 LBS

## 2024-01-22 DIAGNOSIS — M19.042 PRIMARY OSTEOARTHRITIS OF BOTH HANDS: ICD-10-CM

## 2024-01-22 DIAGNOSIS — R74.8 ELEVATED LIVER ENZYMES: ICD-10-CM

## 2024-01-22 DIAGNOSIS — R73.01 IFG (IMPAIRED FASTING GLUCOSE): ICD-10-CM

## 2024-01-22 DIAGNOSIS — I65.23 CAROTID ARTERY PLAQUE, BILATERAL: ICD-10-CM

## 2024-01-22 DIAGNOSIS — Z00.01 ENCOUNTER FOR GENERAL ADULT MEDICAL EXAMINATION WITH ABNORMAL FINDINGS: Primary | ICD-10-CM

## 2024-01-22 DIAGNOSIS — Z86.69 HISTORY OF PERIPHERAL NEUROPATHY: ICD-10-CM

## 2024-01-22 DIAGNOSIS — M85.859 OSTEOPENIA OF HIP, UNSPECIFIED LATERALITY: ICD-10-CM

## 2024-01-22 DIAGNOSIS — M50.90 DISC DISORDER OF CERVICAL REGION: ICD-10-CM

## 2024-01-22 DIAGNOSIS — K21.9 GASTROESOPHAGEAL REFLUX DISEASE WITHOUT ESOPHAGITIS: ICD-10-CM

## 2024-01-22 DIAGNOSIS — F43.22 ADJUSTMENT DISORDER WITH ANXIOUS MOOD: ICD-10-CM

## 2024-01-22 DIAGNOSIS — M16.0 PRIMARY OSTEOARTHRITIS OF BOTH HIPS: ICD-10-CM

## 2024-01-22 DIAGNOSIS — E78.5 DYSLIPIDEMIA: ICD-10-CM

## 2024-01-22 DIAGNOSIS — Z00.00 ENCOUNTER FOR SUBSEQUENT ANNUAL WELLNESS VISIT (AWV) IN MEDICARE PATIENT: ICD-10-CM

## 2024-01-22 DIAGNOSIS — N39.41 URGE INCONTINENCE OF URINE: ICD-10-CM

## 2024-01-22 DIAGNOSIS — M19.041 PRIMARY OSTEOARTHRITIS OF BOTH HANDS: ICD-10-CM

## 2024-01-22 DIAGNOSIS — K22.4 ESOPHAGEAL DYSMOTILITY: ICD-10-CM

## 2024-01-22 DIAGNOSIS — M17.0 PRIMARY OSTEOARTHRITIS OF BOTH KNEES: ICD-10-CM

## 2024-01-22 DIAGNOSIS — G25.0 ESSENTIAL TREMOR: ICD-10-CM

## 2024-01-22 DIAGNOSIS — Z86.2 HISTORY OF IRON DEFICIENCY ANEMIA: ICD-10-CM

## 2024-01-22 PROBLEM — M70.62 TROCHANTERIC BURSITIS OF BOTH HIPS: Status: RESOLVED | Noted: 2020-12-30 | Resolved: 2024-01-22

## 2024-01-22 PROBLEM — M70.61 TROCHANTERIC BURSITIS OF BOTH HIPS: Status: RESOLVED | Noted: 2020-12-30 | Resolved: 2024-01-22

## 2024-01-22 PROCEDURE — G0439 PPPS, SUBSEQ VISIT: HCPCS | Performed by: INTERNAL MEDICINE

## 2024-01-22 PROCEDURE — 1170F FXNL STATUS ASSESSED: CPT | Performed by: INTERNAL MEDICINE

## 2024-01-22 PROCEDURE — 1159F MED LIST DOCD IN RCRD: CPT | Performed by: INTERNAL MEDICINE

## 2024-01-22 PROCEDURE — 1160F RVW MEDS BY RX/DR IN RCRD: CPT | Performed by: INTERNAL MEDICINE

## 2024-01-22 PROCEDURE — 99397 PER PM REEVAL EST PAT 65+ YR: CPT | Performed by: INTERNAL MEDICINE

## 2024-01-22 RX ORDER — ACETAMINOPHEN 160 MG
1000 TABLET,DISINTEGRATING ORAL DAILY
COMMUNITY

## 2024-01-22 RX ORDER — DICLOFENAC SODIUM 75 MG/1
75 TABLET, DELAYED RELEASE ORAL 2 TIMES DAILY
COMMUNITY
Start: 2023-12-27

## 2024-01-22 NOTE — PATIENT INSTRUCTIONS
Medicare Wellness  Personal Prevention Plan of Service     Date of Office Visit:    Encounter Provider:  Levon Barber MD  Place of Service:  [facility]  Patient Name: Noemi Fowler  :  1955    As part of the Medicare Wellness portion of your visit today, we are providing you with this personalized preventive plan of services (PPPS). This plan is based upon recommendations of the [place] Preventive Services Task Force (USPSTF) and the Advisory Committee on Immunization Practices (ACIP).    This lists the preventive care services that should be considered, and provides dates of when you are due. Items listed as completed are up-to-date and do not require any further intervention.    Health Maintenance   Topic Date Due    DXA SCAN  05/10/2023    MAMMOGRAM  2024    PAP SMEAR  2024    ANNUAL WELLNESS VISIT  2025    BMI FOLLOWUP  2025    TDAP/TD VACCINES (3 - Td or Tdap) 2025    COLORECTAL CANCER SCREENING  2030    HEPATITIS C SCREENING  Completed    COVID-19 Vaccine  Completed    INFLUENZA VACCINE  Completed    Pneumococcal Vaccine 65+  Completed    ZOSTER VACCINE  Completed       No orders of the defined types were placed in this encounter.      Return in about 1 year (around 2025) for Medicare Wellness.          
Initial (On Arrival)

## 2024-01-22 NOTE — PROGRESS NOTES
Subjective   Noemi Fowler is a 68 y.o. female who presents for a Medicare Well Visit    Patient Care Team:  Levon Barber MD as PCP - General  Levon Barber MD as PCP - Family Medicine    Recent Hospitalizations: No recent hospitalization(s).    Depression Screen:       2024     1:10 PM   PHQ-2/PHQ-9 Depression Screening   Little Interest or Pleasure in Doing Things 0-->not at all   Feeling Down, Depressed or Hopeless 0-->not at all   PHQ-9: Brief Depression Severity Measure Score 0       Functional and Cognitive Screenin/22/2024     1:00 PM   Functional & Cognitive Status   Do you have difficulty preparing food and eating? No   Do you have difficulty bathing yourself, getting dressed or grooming yourself? No   Do you have difficulty using the toilet? No   Do you have difficulty moving around from place to place? No   Do you have trouble with steps or getting out of a bed or a chair? No   Current Diet Well Balanced Diet   Dental Exam Up to date   Eye Exam Up to date   Exercise (times per week) 3 times per week   Current Exercises Include Walking   Do you need help using the phone?  No   Are you deaf or do you have serious difficulty hearing?  No   Do you need help to go to places out of walking distance? No   Do you need help shopping? No   Do you need help preparing meals?  No   Do you need help with housework?  No   Do you need help with laundry? No   Do you need help taking your medications? No   Do you need help managing money? No   Do you ever drive or ride in a car without wearing a seat belt? No   Have you felt unusual stress, anger or loneliness in the last month? No   Who do you live with? Other   If you need help, do you have trouble finding someone available to you? No   Have you been bothered in the last four weeks by sexual problems? No   Do you have difficulty concentrating, remembering or making decisions? No       Does the patient have evidence of cognitive impairment?  no    No opioid medication identified on active medication list. I have reviewed chart for other potential  high risk medication/s and harmful drug interactions in the elderly.          Does not need ASA (and currently is not on it)    Vitals:    01/22/24 1304   BP: 130/68   BP Location: Left arm   Patient Position: Sitting   Cuff Size: Adult   Pulse: 63   Temp: 98.1 °F (36.7 °C)   TempSrc: Infrared   SpO2: 98%   Weight: 74.6 kg (164 lb 6.4 oz)       Body mass index is 25.74 kg/m².    Visual Acuity:  No results found.    Advanced Care Planning:  ACP discussion was held with the patient during this visit. Patient has an advance directive in EMR which is still valid.     Compared to one year ago, the patient feels physical health is the same.  Compared to one year ago, the patient feels mental health is better.    Reviewed chart for potential of high risk medication in the elderly: yes  Reviewed chart for potential of harmful drug interactions in the elderly:yes    Identification of Risk Factors:  Risk factors include: Advance Directive Discussion  Breast Cancer/Mammogram Screening  Colon Cancer Screening  Diabetic Lab Screening   Glaucoma Risk  Immunizations Discussed/Encouraged (specific immunizations; Tdap, Hepatitis A Vaccine/Series, Influenza, Pneumococcal 23, Shingrix, COVID19, and RSV (Respiratory Syncytial Virus) )  Obesity/Overweight   Urinary Incontinence.    Patient Self-Management and Personalized Health Advice  The patient has been provided with information about: diet, exercise, and weight management and preventive services including:   Annual Wellness Visit (AWV)  Bone Density Measurements  Screening Mammography .  Follow Up: Medicare visit in one year    Discussed the patient's BMI with her. The BMI is above average; BMI management plan is completed.    Patient has multiple medical problems which are significant and separately identifiable that require additional work above and beyond the Medicare  "Wellness Visit. These are not trivial or insignificant and are billed with a 25-modifier    Chief Complaint   Patient presents with    Medicare Wellness-subsequent     Review of medical issues       HPI:  Noemi Fowler is a 68 y.o. female RTC in yearly CPE/ AWV, review of medical issues:  1. Hx of LESLI noted after acute bowel change issues ~10/2022 - bowels resolved after last visit, no recurrence.  CBC back to baseline again today, MCV recovered and iron remains replete on labs today off oral iron.  FOBT (-) x 2 in past. ?? Etiology was 'double red cell donations' at [facility] prior to GI event presentation in office.  Last donation was in 11/2023 and did single unit donation.  Did take a few tabs of iron prior to giving blood. No bleeding issues.   2. Adjustment D/O with panic event and new onset HA - overall much better with work with counselor/ family therapy. Mood is better and stressors 'really getting better, better'.     Still has some issues with son but is overall 'happier' at this time.   3. Overweight/ IFG - some noted intentional weight loss. Is working on activity and good diet mods.   4. Dyslipidemia, noted issue of right ICA mild plaque and left ICA very mild plaque no stenosis on CT angio 4/2022 and MRI brain with chronic small vessel ischemic changes - \"I was miserable on that rosuvastatin. My whole body ached'.  Did trial off med for ~1 month.  Been back on med at 5mg and 'no side effects'.  Did try CoQ10 and 'been a while'.    5. Essential Tremor, L > R - dx'd in 2015, after (-) DAMIAN scan, mild progression. Sees Dr. iWlhelm in neuro annually.  \"My hand can shake like crazy'.  Still off meds.   6. Neuropathy, tingling on whole L side of body - s/p extensive w/u with Neurology, unclear etiology. Remains off Gabapentin for lack of benefit. \"Still the same\".   7. Cervical spine DJD/ DDD s/p fusion at C5-C6 and s/p epidurals in past - [facility].    8. OA, in hands and knees/ shoulders - minimal sx, not " "limting. \"I dont have anyproblem holding on to things'.  Using meds 'really only as I need it'.   9. Hx of Trochanteric bursitis B, new dx of OA B hips - s/p eval at ortho again in 2023 , DR. FULTON, and had injx B.  Really quick relief noted. Planning for surgery 'down the road'.    10. Urge Incontinence - wearing pad.  Issue deferred today. U/A clear.   11. HM - saw Gyn ~9/2023, had Pap and Mammo.     Review of Systems   Constitutional: Positive for weight loss (intentional, 'going down and staying down'). Negative for chills, fever and malaise/fatigue.   HENT:  Negative for congestion, hearing loss, odynophagia and sore throat.    Eyes:  Negative for discharge, double vision, pain and redness.        Last eye exam 2023, 'come back next year'. NO glasses.    Cardiovascular:  Negative for chest pain, dyspnea on exertion, irregular heartbeat, leg swelling, near-syncope, palpitations and syncope.   Respiratory:  Negative for cough and shortness of breath.    Endocrine: Negative for polydipsia, polyphagia and polyuria.   Hematologic/Lymphatic: Negative for bleeding problem. Does not bruise/bleed easily.   Skin:  Negative for rash and suspicious lesions.   Musculoskeletal:  Positive for arthritis and joint pain. Negative for joint swelling, muscle cramps, muscle weakness and myalgias.   Gastrointestinal:  Negative for constipation, diarrhea, dysphagia, heartburn, nausea and vomiting.   Genitourinary:  Positive for bladder incontinence (mild, 'still an issue', not doing exercises, wearing pad). Negative for dysuria, frequency, hematuria, hesitancy and incomplete emptying.   Neurological:  Negative for dizziness, headaches and light-headedness.   Psychiatric/Behavioral:  Negative for depression. The patient does not have insomnia and is not nervous/anxious.    Allergic/Immunologic: Negative for environmental allergies and persistent infections.     @OBJECTIVE BEGIN@  Vitals:    01/22/24 1304   BP: 130/68   Pulse: 63   Temp: " 98.1 °F (36.7 °C)   SpO2: 98%     Physical Exam  Vitals reviewed.   Constitutional:       General: She is not in acute distress.     Appearance: Normal appearance. She is well-developed. She is not ill-appearing or toxic-appearing.   HENT:      Head: Normocephalic and atraumatic.      Right Ear: Hearing, tympanic membrane, ear canal and external ear normal. There is no impacted cerumen.      Left Ear: Hearing, tympanic membrane, ear canal and external ear normal. There is no impacted cerumen.      Nose: Nose normal.      Mouth/Throat:      Mouth: Mucous membranes are moist. No injury or oral lesions.      Dentition: Does not have dentures.      Tongue: No lesions.      Pharynx: Oropharynx is clear. Uvula midline. No pharyngeal swelling, oropharyngeal exudate, posterior oropharyngeal erythema or uvula swelling.      Comments: Bite guard in place on exam    Eyes:      General: Lids are normal. No scleral icterus.        Right eye: No discharge.         Left eye: No discharge.      Extraocular Movements: Extraocular movements intact.      Conjunctiva/sclera: Conjunctivae normal.      Pupils: Pupils are equal, round, and reactive to light.   Neck:      Thyroid: No thyroid mass or thyromegaly.      Vascular: No carotid bruit.      Trachea: Trachea normal.   Cardiovascular:      Rate and Rhythm: Normal rate and regular rhythm.      Pulses:           Radial pulses are 2+ on the right side and 2+ on the left side.        Dorsalis pedis pulses are 2+ on the right side and 2+ on the left side.        Posterior tibial pulses are 2+ on the right side and 2+ on the left side.      Heart sounds: Normal heart sounds, S1 normal and S2 normal. No murmur heard.     No friction rub. No gallop.   Pulmonary:      Effort: Pulmonary effort is normal. No respiratory distress.      Breath sounds: Normal breath sounds. No wheezing, rhonchi or rales.   Abdominal:      General: Bowel sounds are normal. There is no distension.      Palpations:  Abdomen is soft. There is no mass.      Tenderness: There is no abdominal tenderness. There is no guarding or rebound.   Musculoskeletal:         General: Normal range of motion.      Right shoulder: No tenderness, bony tenderness or crepitus. Normal range of motion.      Left shoulder: No tenderness, bony tenderness or crepitus. Normal range of motion.      Right hand: No tenderness or bony tenderness. Normal range of motion. Normal strength.      Left hand: No tenderness or bony tenderness. Normal range of motion. Normal strength.      Cervical back: Full passive range of motion without pain. No rigidity. No muscular tenderness. Normal range of motion.      Right lower leg: No edema.      Left lower leg: No edema.      Right foot: Normal range of motion. No deformity or bunion.      Left foot: Normal range of motion. No deformity or bunion.   Feet:      Right foot:      Skin integrity: No ulcer, blister or skin breakdown.      Left foot:      Skin integrity: No ulcer, blister or skin breakdown.   Lymphadenopathy:      Cervical: No cervical adenopathy.      Upper Body:      Right upper body: No supraclavicular, axillary or pectoral adenopathy.      Left upper body: No supraclavicular, axillary or pectoral adenopathy.      Lower Body: No right inguinal adenopathy. No left inguinal adenopathy.   Skin:     General: Skin is warm and dry.      Findings: No rash.   Neurological:      Mental Status: She is alert and oriented to person, place, and time.      Cranial Nerves: No cranial nerve deficit, dysarthria or facial asymmetry.      Sensory: No sensory deficit.      Motor: Tremor (L hand, mild, action) present. No weakness, atrophy or abnormal muscle tone.      Gait: Gait normal.      Deep Tendon Reflexes: Reflexes are normal and symmetric.      Reflex Scores:       Patellar reflexes are 2+ on the right side and 2+ on the left side.       Achilles reflexes are 2+ on the right side and 2+ on the left side.  Psychiatric:          Mood and Affect: Mood normal.         Behavior: Behavior normal.         Thought Content: Thought content normal.           Assessment & Plan   Diagnoses and all orders for this visit:    1. Encounter for general adult medical examination with abnormal findings (Primary)    2. Encounter for subsequent annual wellness visit (AWV) in Medicare patient    3. Adjustment disorder with anxious mood    4. Disc disorder of cervical region    5. Dyslipidemia    6. Carotid artery plaque, bilateral    7. Essential tremor    8. Gastroesophageal reflux disease without esophagitis    9. History of iron deficiency anemia    10. History of peripheral neuropathy    11. IFG (impaired fasting glucose)    12. Primary osteoarthritis of both hands    13. Primary osteoarthritis of both knees    14. Osteopenia of hip, unspecified laterality    15. Urge incontinence of urine    16. Primary osteoarthritis of both hips    17. Esophageal dysmotility    18. Elevated liver enzymes          Diagnoses and all orders for this visit:    Encounter for general adult medical examination with abnormal findings    Encounter for subsequent annual wellness visit (AWV) in Medicare patient    Adjustment disorder with anxious mood    Disc disorder of cervical region    Dyslipidemia    Carotid artery plaque, bilateral    Essential tremor    Gastroesophageal reflux disease without esophagitis    History of iron deficiency anemia    History of peripheral neuropathy    IFG (impaired fasting glucose)    Primary osteoarthritis of both hands    Primary osteoarthritis of both knees    Osteopenia of hip, unspecified laterality    Urge incontinence of urine    Primary osteoarthritis of both hips    Esophageal dysmotility    Elevated liver enzymes    Other orders  -     cholecalciferol (VITAMIN D3) 25 MCG (1000 UT) tablet; Take 1,000 Units by mouth Daily.  -     diclofenac (VOLTAREN) 75 MG EC tablet; Take 1 tablet by mouth 2 (Two) Times a Day.        Noemi HINDS  Malcolm is a 68 y.o. female RTC in yearly CPE/ AWV, review of medical issues:  1. Hx of LESLI noted after acute bowel change issues ~10/2022 - bowels resolved after last visit, no recurrence.  CBC back to baseline again today, MCV recovered and iron remains replete on labs today off oral iron.  FOBT (-) x 2 in past. ?? Etiology was 'double red cell donations' at [facility] prior to GI event presentation in office - CBC OK today, off oral iron, despite last donation was in 11/2023, single unit. Advised again to take iron supplements daily OTC around time of donation.   2. Adjustment D/O with panic event and new onset HA - overall much better with work with counselor/ family therapy. Mood is better and stressors reduced.   3. Overweight/ IFG - resolved A1C on labs with noted intentional weight loss. C/W TLC mods.  4. Dyslipidemia, noted issue of right ICA mild plaque and left ICA very mild plaque no stenosis on CT angio 4/2022 and MRI brain with chronic small vessel ischemic changes; LDL peak at 212 - intolerant with 10mg rosuvastatin dosing, marked myalgias diffusely.  Tolerating 5mg dosing now, but new issues of LFT elevation today (despite weight loss, and < daily ETOH intake).  ?? Statin related.    - D/C statin  - recheck LFT's in 3-4 weeks.  W/U further if elevation persists.    - if resolved next check, then consider pravastatin 40mg daily for LDL lowering (goal >50% from baseline) with consideration of moving to pitavastatin over time as moves to generic option.   5. Abdominal bloating/ fullness, nausea s/p lap cristiano and appy; subsequent mild gastritis and Schatzki's ring/ spastic esophagus on EGD in 2017- S/P eval with Dr. Arredondo 11/2021.  Pt did not take Abx for SIBO, sx resolved off large volume popcorn intake. No recurrence. C/W PPI daily.   6. Essential Tremor, L > R - dx'd in 2015, after (-) DAMIAN scan, mild progression. S/P eval with Dr. Wilhelm in neuro annually.  Declines meds at this time.   7.  Neuropathy, tingling on whole L side of body - s/p extensive w/u with Neurology, unclear etiology. Remains off Gabapentin for lack of benefit. Unchanged.   8. Cervical spine DJD/ DDD s/p fusion at C5-C6 and s/p epidurals in past - [facility].   9. OA, in hands and knees/ shoulders - minimal sx, not limiting. Voltaren gel PRN and rare Tylenol.   10. Hx of Trochanteric bursitis B, new dx of OA B hips in 2023 - s/p eval at ortho in 2023 , DR. FULTON, s/p injx B.  Sx improved. Deferring surgery for injx based mgmnt at this time.   11. Urge Incontinence - requiring pad. Declines PT retrial, but will commit to pelvic floor exercises on own.  Agrees to consider PT tx approach once has more time available. U/A clear today.   12. HM - labs d/w pt; Flu/ Tdap/ Hep A/ Zostavax/ Prevnar/ Pvax/ Shingrix/ COVID - UTD; C-scope 12/2015 (-) -->C-scope 7/2020 (1 hyperplastic polyp); Pap UTD 9/2023;  Mammo UTD 9/2023 with Gyn; DEXA normal 7/2016 per Gyn --> mild osteopenia hip 5/2021 --> DEXA per Gyn; Hep C Ab (-) 2014; AAA (-) on 2015 CT A/P and U/S in 10/2019; c/w exercise; Preventative care plan provided to pt at end of visit     Return in about 1 year (around 1/22/2025) for Medicare Wellness.          Current Outpatient Medications:     Ascorbic Acid (VITAMIN C PO), Take 1 tablet by mouth Daily. 50 mg, Disp: , Rfl:     Calcium Carb-Cholecalciferol (CALCIUM + D3) 600-200 MG-UNIT tablet, Take  by mouth., Disp: , Rfl:     cholecalciferol (VITAMIN D3) 25 MCG (1000 UT) tablet, Take 1,000 Units by mouth Daily., Disp: , Rfl:     Misc Natural Products (GLUCOS-CHONDROIT-MSM COMPLEX) tablet, Take  by mouth., Disp: , Rfl:     omeprazole (priLOSEC) 40 MG capsule, TAKE 1 CAPSULE BY MOUTH EVERY DAY, Disp: 90 capsule, Rfl: 3    diclofenac (VOLTAREN) 75 MG EC tablet, Take 1 tablet by mouth 2 (Two) Times a Day., Disp: , Rfl:

## 2024-02-13 ENCOUNTER — PATIENT MESSAGE (OUTPATIENT)
Dept: INTERNAL MEDICINE | Facility: CLINIC | Age: 69
End: 2024-02-13
Payer: MEDICARE

## 2024-02-14 DIAGNOSIS — G89.29 CHRONIC LOW BACK PAIN WITH SCIATICA, SCIATICA LATERALITY UNSPECIFIED, UNSPECIFIED BACK PAIN LATERALITY: Primary | ICD-10-CM

## 2024-02-14 DIAGNOSIS — M54.10 RADICULAR LEG PAIN: ICD-10-CM

## 2024-02-14 DIAGNOSIS — R79.89 ELEVATED LFTS: Primary | ICD-10-CM

## 2024-02-14 DIAGNOSIS — M54.40 CHRONIC LOW BACK PAIN WITH SCIATICA, SCIATICA LATERALITY UNSPECIFIED, UNSPECIFIED BACK PAIN LATERALITY: Primary | ICD-10-CM

## 2024-02-14 LAB
ALBUMIN SERPL-MCNC: 4.7 G/DL (ref 3.5–5.2)
ALP SERPL-CCNC: 94 U/L (ref 39–117)
ALT SERPL-CCNC: 16 U/L (ref 1–33)
AST SERPL-CCNC: 16 U/L (ref 1–32)
BILIRUB DIRECT SERPL-MCNC: <0.2 MG/DL (ref 0–0.3)
BILIRUB SERPL-MCNC: 0.7 MG/DL (ref 0–1.2)
PROT SERPL-MCNC: 7 G/DL (ref 6–8.5)

## 2024-02-20 ENCOUNTER — HOSPITAL ENCOUNTER (OUTPATIENT)
Dept: GENERAL RADIOLOGY | Facility: HOSPITAL | Age: 69
Discharge: HOME | End: 2024-02-20
Admitting: INTERNAL MEDICINE
Payer: MEDICARE

## 2024-02-20 DIAGNOSIS — M54.10 RADICULAR LEG PAIN: ICD-10-CM

## 2024-02-20 DIAGNOSIS — G89.29 CHRONIC LOW BACK PAIN WITH SCIATICA, SCIATICA LATERALITY UNSPECIFIED, UNSPECIFIED BACK PAIN LATERALITY: ICD-10-CM

## 2024-02-20 DIAGNOSIS — M54.40 CHRONIC LOW BACK PAIN WITH SCIATICA, SCIATICA LATERALITY UNSPECIFIED, UNSPECIFIED BACK PAIN LATERALITY: ICD-10-CM

## 2024-02-20 PROCEDURE — 72110 X-RAY EXAM L-2 SPINE 4/>VWS: CPT

## 2024-02-26 ENCOUNTER — OFFICE VISIT (OUTPATIENT)
Dept: INTERNAL MEDICINE | Facility: CLINIC | Age: 69
End: 2024-02-26
Payer: MEDICARE

## 2024-02-26 VITALS
HEIGHT: 67 IN | BODY MASS INDEX: 28.38 KG/M2 | WEIGHT: 180.8 LBS | TEMPERATURE: 97.4 F | DIASTOLIC BLOOD PRESSURE: 68 MMHG | OXYGEN SATURATION: 99 % | SYSTOLIC BLOOD PRESSURE: 124 MMHG | HEART RATE: 62 BPM

## 2024-02-26 DIAGNOSIS — G89.29 CHRONIC LEFT-SIDED LOW BACK PAIN WITH LEFT-SIDED SCIATICA: ICD-10-CM

## 2024-02-26 DIAGNOSIS — M51.36 DDD (DEGENERATIVE DISC DISEASE), LUMBAR: ICD-10-CM

## 2024-02-26 DIAGNOSIS — M54.42 CHRONIC LEFT-SIDED LOW BACK PAIN WITH LEFT-SIDED SCIATICA: ICD-10-CM

## 2024-02-26 DIAGNOSIS — M47.26 OSTEOARTHRITIS OF SPINE WITH RADICULOPATHY, LUMBAR REGION: Primary | ICD-10-CM

## 2024-02-26 PROCEDURE — 99214 OFFICE O/P EST MOD 30 MIN: CPT | Performed by: INTERNAL MEDICINE

## 2024-02-26 PROCEDURE — 1159F MED LIST DOCD IN RCRD: CPT | Performed by: INTERNAL MEDICINE

## 2024-02-26 PROCEDURE — 1160F RVW MEDS BY RX/DR IN RCRD: CPT | Performed by: INTERNAL MEDICINE

## 2024-02-26 RX ORDER — METHYLPREDNISOLONE 4 MG/1
TABLET ORAL
Qty: 21 TABLET | Refills: 0 | Status: SHIPPED | OUTPATIENT
Start: 2024-02-26

## 2024-02-26 RX ORDER — ACETAMINOPHEN 500 MG
TABLET ORAL
Start: 2024-02-26

## 2024-02-26 NOTE — PROGRESS NOTES
" Chronic low back pain with sciatica (Review X-ray results.)      HPI  Noemi Fowler is a 68 y.o. female RTC In acute care:   Saw Dr. FULTON in 1/2024 noting B hip pain. Has OA in both hips.  Had injx in both hips and feels like 'my hips are better'. However, noted to Dr. FULTON that having pain L leg that will start in hip region and running down to feet.    Started with focal L leg pain that shoots 'all the way down' since ~10/2023, 'off and on'. Is progressive.  'Unbearable' at times in last 3 weeks.  Has some issues standing and severe pain, will have to sit down.  \"Weird cramping and muscle cathie/ squeezing and then when I stand too long I get a weird shooting pain down'. No weakness in L leg. No foot drop, no tripping.   Sitting will improve. Heating pad helps.    Iburpofen > Tylenol daily for control of pain. Taking ~ 600mg 1-2x/ day.  I.     Per Pt Phone Note in Chart 2/13/24:   'Hi Dr Barber,    I saw Dr. Alicea in January and he said I have bone spurs in both hips.  I got an injection in both hips on January 3 to help with my discomfort.        I told him I experience severe leg pain that starts with my hip and goes to the bottom of my feet.  I also feel like I have needles in the bottom of my feet.  Dr. FULTON said that all of my pain is not related to the hips and that I would need an MRI on my lower back to see if that is the source of my pain.  So, here is my question:  Should I call Dr. FULTON’s office to have them schedule a MRI or can your office do it?     Thanks for your attention'    Answers submitted by the patient for this visit:  Primary Reason for Visit (Submitted on 2/21/2024)  What is the primary reason for your visit?: Extremity Pain  Lower Extremity Injury Questionnaire (Submitted on 2/21/2024)  Chief Complaint: Extremity pain  Injury: No  Pain location: left upper leg, left lower leg, left foot  Pain quality: cramping, shooting  Pain - numeric: 7/10  Progression since onset: worse  tingling: " "Yes    Review of Systems   Musculoskeletal:  Positive for arthritis, back pain, joint pain and myalgias (leg). Negative for joint swelling and muscle weakness.   Gastrointestinal:  Negative for bowel incontinence.   Genitourinary:  Negative for bladder incontinence.   Neurological:  Positive for numbness and paresthesias (L leg). Negative for focal weakness.       The following portions of the patient's history were reviewed and updated as appropriate: allergies, current medications, past medical history, past social history, and problem list.      Current Outpatient Medications:     Ascorbic Acid (VITAMIN C PO), Take 1 tablet by mouth Daily. 50 mg, Disp: , Rfl:     Calcium Carb-Cholecalciferol (CALCIUM + D3) 600-200 MG-UNIT tablet, Take  by mouth., Disp: , Rfl:     cholecalciferol (VITAMIN D3) 25 MCG (1000 UT) tablet, Take 1,000 Units by mouth Daily., Disp: , Rfl:     Misc Natural Products (GLUCOS-CHONDROIT-MSM COMPLEX) tablet, Take  by mouth., Disp: , Rfl:     omeprazole (priLOSEC) 40 MG capsule, TAKE 1 CAPSULE BY MOUTH EVERY DAY, Disp: 90 capsule, Rfl: 3    acetaminophen (TYLENOL) 500 MG tablet, 2 tabs PO Q 8 hours PRN, Disp: , Rfl:     methylPREDNISolone (MEDROL) 4 MG dose pack, Take as directed on package instructions., Disp: 21 tablet, Rfl: 0    Vitals:    02/26/24 0953   BP: 124/68   BP Location: Right arm   Patient Position: Sitting   Cuff Size: Adult   Pulse: 62   Temp: 97.4 °F (36.3 °C)   TempSrc: Infrared   SpO2: 99%   Weight: 82 kg (180 lb 12.8 oz)   Height: 170.2 cm (67.01\")     Body mass index is 28.31 kg/m².      Physical Exam  Vitals reviewed.   Constitutional:       General: She is not in acute distress.     Appearance: She is well-developed. She is not ill-appearing or toxic-appearing.   HENT:      Head: Normocephalic.   Eyes:      General: No scleral icterus.  Pulmonary:      Effort: Pulmonary effort is normal. No respiratory distress.   Abdominal:      General: Abdomen is flat. There is no " distension.      Palpations: Abdomen is soft.      Tenderness: There is no abdominal tenderness.   Musculoskeletal:      Lumbar back: No swelling, deformity, tenderness or bony tenderness. Normal range of motion. Negative right straight leg raise test and negative left straight leg raise test.      Right hip: No tenderness or bony tenderness. Decreased range of motion. Normal strength.      Left hip: No tenderness or bony tenderness. Decreased range of motion. Normal strength.      Right lower leg: No edema.      Left lower leg: No edema.      Right foot: No swelling.      Left foot: No swelling.   Neurological:      Mental Status: She is alert and oriented to person, place, and time.      Motor: No weakness, atrophy or abnormal muscle tone.      Gait: Gait normal.      Deep Tendon Reflexes:      Reflex Scores:       Patellar reflexes are 2+ on the right side and 2+ on the left side.       Achilles reflexes are 2+ on the right side and 2+ on the left side.  Psychiatric:         Behavior: Behavior normal.         Thought Content: Thought content normal.         Assessment/ Plan  Diagnoses and all orders for this visit:    Osteoarthritis of spine with radiculopathy, lumbar region  -     acetaminophen (TYLENOL) 500 MG tablet; 2 tabs PO Q 8 hours PRN  -     methylPREDNISolone (MEDROL) 4 MG dose pack; Take as directed on package instructions.  -     MRI Lumbar Spine Without Contrast; Future    DDD (degenerative disc disease), lumbar  -     acetaminophen (TYLENOL) 500 MG tablet; 2 tabs PO Q 8 hours PRN  -     methylPREDNISolone (MEDROL) 4 MG dose pack; Take as directed on package instructions.  -     MRI Lumbar Spine Without Contrast; Future    Chronic left-sided low back pain with left-sided sciatica  -     acetaminophen (TYLENOL) 500 MG tablet; 2 tabs PO Q 8 hours PRN  -     methylPREDNISolone (MEDROL) 4 MG dose pack; Take as directed on package instructions.  -     MRI Lumbar Spine Without Contrast;  Future        Return for Next scheduled follow up.      Discussion:  Noemi Fowler is a 68 y.o. female with hx of OA multijoint (recent hip injx B at ortho),Neuropathy (tingling on whole L side of body) s/p extensive w/u with Neurology with unclear etiology, and Cervical spine DJD/ DDD s/p fusion at C5-C6 and s/p epidurals in past RTC In acute care (new issue to examiner) with progressive L low back/ buttock pain with radicular sx to L foot that is progressive in last 4-6 months.   QOL limiting at this time. Partial response to hip OA injx with Dr. FULTON in ortho, but radiular element remains.  DDD/ DJD at L4-S1 on XR noted.    - Proceed with MRI L-spine  - Medrol DosePack steroid tapers  - Tylenol q8 hours ATC for foundation of pain control, NSAID use PRN only.  - F/U after MRI.   - D/W pt red flag sx and to call if any occur or progressive sx.     RTC as planned.

## 2024-03-12 ENCOUNTER — HOSPITAL ENCOUNTER (OUTPATIENT)
Dept: MRI IMAGING | Facility: HOSPITAL | Age: 69
Discharge: HOME | End: 2024-03-12
Admitting: INTERNAL MEDICINE
Payer: MEDICARE

## 2024-03-12 DIAGNOSIS — M51.36 DDD (DEGENERATIVE DISC DISEASE), LUMBAR: ICD-10-CM

## 2024-03-12 DIAGNOSIS — M54.42 CHRONIC LEFT-SIDED LOW BACK PAIN WITH LEFT-SIDED SCIATICA: ICD-10-CM

## 2024-03-12 DIAGNOSIS — M47.26 OSTEOARTHRITIS OF SPINE WITH RADICULOPATHY, LUMBAR REGION: ICD-10-CM

## 2024-03-12 DIAGNOSIS — G89.29 CHRONIC LEFT-SIDED LOW BACK PAIN WITH LEFT-SIDED SCIATICA: ICD-10-CM

## 2024-03-12 PROCEDURE — 72148 MRI LUMBAR SPINE W/O DYE: CPT

## 2024-03-14 DIAGNOSIS — M51.36 DDD (DEGENERATIVE DISC DISEASE), LUMBAR: ICD-10-CM

## 2024-03-14 DIAGNOSIS — M54.42 CHRONIC LEFT-SIDED LOW BACK PAIN WITH LEFT-SIDED SCIATICA: ICD-10-CM

## 2024-03-14 DIAGNOSIS — Z86.69 HISTORY OF PERIPHERAL NEUROPATHY: Primary | ICD-10-CM

## 2024-03-14 DIAGNOSIS — M16.0 PRIMARY OSTEOARTHRITIS OF BOTH HIPS: ICD-10-CM

## 2024-03-14 DIAGNOSIS — G89.29 CHRONIC LEFT-SIDED LOW BACK PAIN WITH LEFT-SIDED SCIATICA: ICD-10-CM

## 2024-03-19 ENCOUNTER — TELEPHONE (OUTPATIENT)
Dept: INTERNAL MEDICINE | Facility: CLINIC | Age: 69
End: 2024-03-19

## 2024-03-19 NOTE — TELEPHONE ENCOUNTER
Caller: Noemi Fowler    Relationship: Self    Best call back number: [phone]     What is the best time to reach you: ANYTIME     Who are you requesting to speak with (clinical staff, provider,  specific staff member): DR. FIGUEROA    What was the call regarding: PATIENT RETURNING PHONE CALL SAID AVAILABLE ALL DAY

## 2024-09-10 ENCOUNTER — PATIENT MESSAGE (OUTPATIENT)
Dept: INTERNAL MEDICINE | Facility: CLINIC | Age: 69
End: 2024-09-10
Payer: MEDICARE

## 2024-09-10 RX ORDER — ROSUVASTATIN CALCIUM 5 MG/1
5 TABLET, COATED ORAL DAILY
Qty: 90 TABLET | Refills: 2 | Status: SHIPPED | OUTPATIENT
Start: 2024-09-10

## 2024-09-10 NOTE — TELEPHONE ENCOUNTER
From: Noemi Fowler  To: Levon Barber  Sent: 9/10/2024 10:41 AM EDT  Subject: Prescription     Morning Dr Barber, I know I am asking way too late about me reducing my Rosavastatin. I have been dealing with my hips and back pain. I plan on having hip replacements once my mom passes away. I am also going to a pain management doctor and have already had epidural and nerve ablation. It has helped somewhat. The main source of pain is definitely my hips…. Bad cases of arthritis.  My mom is under Hospice care but she is not in pain. Thank God. No time frame as of now as to her passing.   I got off the Rosuvastatin a long time ago and never got back on it at 5 mg. In the meantime, the medication mistakenly got disposed. So if you don’t mind, will you please prescribe it again and I will give it another try at 5mg. My pharmacy is [facility] on [facility].   Thanks for your attention.

## 2024-09-22 DIAGNOSIS — K22.2 ESOPHAGEAL OBSTRUCTION: ICD-10-CM

## 2024-09-22 DIAGNOSIS — K21.9 GASTRO-ESOPHAGEAL REFLUX DISEASE WITHOUT ESOPHAGITIS: ICD-10-CM

## 2024-09-23 RX ORDER — OMEPRAZOLE 40 MG/1
CAPSULE, DELAYED RELEASE ORAL
Qty: 90 CAPSULE | Refills: 3 | Status: SHIPPED | OUTPATIENT
Start: 2024-09-23

## 2025-02-25 ENCOUNTER — TELEPHONE (OUTPATIENT)
Dept: INTERNAL MEDICINE | Facility: CLINIC | Age: 70
End: 2025-02-25
Payer: MEDICARE

## 2025-02-25 NOTE — TELEPHONE ENCOUNTER
Caller: ANIRUDH- [facility] YOUR [facility] [employer]    Relationship: Other    Best call back number: [phone]     What was the call regarding: PLEASE ADVISE ANIRUDH IF THE FAX THAT WAS SENT ON 02/20 WAS RECEIVED. THIS INCLUDED PATIENT'S TEST RESULTS.

## 2025-02-27 RX ORDER — ROSUVASTATIN CALCIUM 5 MG/1
5 TABLET, COATED ORAL DAILY
Qty: 90 TABLET | Refills: 0 | Status: SHIPPED | OUTPATIENT
Start: 2025-02-27

## 2025-03-31 DIAGNOSIS — Z00.00 ENCOUNTER FOR ANNUAL WELLNESS VISIT (AWV) IN MEDICARE PATIENT: Primary | ICD-10-CM

## 2025-03-31 DIAGNOSIS — R73.01 IFG (IMPAIRED FASTING GLUCOSE): ICD-10-CM

## 2025-03-31 DIAGNOSIS — Z13.29 SCREENING FOR THYROID DISORDER: ICD-10-CM

## 2025-03-31 DIAGNOSIS — I10 HYPERTENSION, UNSPECIFIED TYPE: ICD-10-CM

## 2025-03-31 DIAGNOSIS — D64.9 ANEMIA, UNSPECIFIED TYPE: ICD-10-CM

## 2025-03-31 DIAGNOSIS — N39.41 URGE INCONTINENCE OF URINE: ICD-10-CM

## 2025-03-31 DIAGNOSIS — R74.8 ELEVATED LIVER ENZYMES: ICD-10-CM

## 2025-03-31 DIAGNOSIS — D50.9 IRON DEFICIENCY ANEMIA, UNSPECIFIED IRON DEFICIENCY ANEMIA TYPE: ICD-10-CM

## 2025-03-31 DIAGNOSIS — E78.5 DYSLIPIDEMIA: ICD-10-CM

## 2025-03-31 DIAGNOSIS — R79.89 ELEVATED LFTS: ICD-10-CM

## 2025-04-04 LAB
ALBUMIN SERPL-MCNC: 4.3 G/DL (ref 3.5–5.2)
ALBUMIN/GLOB SERPL: 2 G/DL
ALP SERPL-CCNC: 164 U/L (ref 39–117)
ALT SERPL-CCNC: 15 U/L (ref 1–33)
APPEARANCE UR: CLEAR
AST SERPL-CCNC: 18 U/L (ref 1–32)
BACTERIA #/AREA URNS HPF: NORMAL /[HPF]
BASOPHILS # BLD AUTO: 0.03 10*3/MM3 (ref 0–0.2)
BASOPHILS NFR BLD AUTO: 0.6 % (ref 0–1.5)
BILIRUB SERPL-MCNC: 0.4 MG/DL (ref 0–1.2)
BILIRUB UR QL STRIP: NEGATIVE
BUN SERPL-MCNC: 17 MG/DL (ref 8–23)
BUN/CREAT SERPL: 23 (ref 7–25)
CALCIUM SERPL-MCNC: 9.4 MG/DL (ref 8.6–10.5)
CASTS URNS QL MICRO: NORMAL /LPF
CHLORIDE SERPL-SCNC: 107 MMOL/L (ref 98–107)
CHOLEST SERPL-MCNC: 143 MG/DL (ref 0–200)
CHOLEST/HDLC SERPL: 3.4 {RATIO}
CO2 SERPL-SCNC: 28.8 MMOL/L (ref 22–29)
COLOR UR: YELLOW
CREAT SERPL-MCNC: 0.74 MG/DL (ref 0.57–1)
EGFRCR SERPLBLD CKD-EPI 2021: 87.2 ML/MIN/1.73
EOSINOPHIL # BLD AUTO: 0.07 10*3/MM3 (ref 0–0.4)
EOSINOPHIL NFR BLD AUTO: 1.4 % (ref 0.3–6.2)
EPI CELLS #/AREA URNS HPF: NORMAL /HPF (ref 0–10)
ERYTHROCYTE [DISTWIDTH] IN BLOOD BY AUTOMATED COUNT: 12.8 % (ref 12.3–15.4)
FERRITIN SERPL-MCNC: 30.6 NG/ML (ref 13–150)
GLOBULIN SER CALC-MCNC: 2.2 GM/DL
GLUCOSE SERPL-MCNC: 100 MG/DL (ref 65–99)
GLUCOSE UR QL STRIP: NEGATIVE
HBA1C MFR BLD: 5.3 % (ref 4.8–5.6)
HCT VFR BLD AUTO: 36.1 % (ref 34–46.6)
HDLC SERPL-MCNC: 42 MG/DL (ref 40–60)
HGB BLD-MCNC: 11.7 G/DL (ref 12–15.9)
HGB UR QL STRIP: NEGATIVE
IMM GRANULOCYTES # BLD AUTO: 0.01 10*3/MM3 (ref 0–0.05)
IMM GRANULOCYTES NFR BLD AUTO: 0.2 % (ref 0–0.5)
IRON SATN MFR SERPL: 9 % (ref 20–50)
IRON SERPL-MCNC: 45 MCG/DL (ref 37–145)
KETONES UR QL STRIP: NEGATIVE
LDLC SERPL CALC-MCNC: 83 MG/DL (ref 0–100)
LEUKOCYTE ESTERASE UR QL STRIP: NEGATIVE
LYMPHOCYTES # BLD AUTO: 1.85 10*3/MM3 (ref 0.7–3.1)
LYMPHOCYTES NFR BLD AUTO: 38.2 % (ref 19.6–45.3)
MCH RBC QN AUTO: 28 PG (ref 26.6–33)
MCHC RBC AUTO-ENTMCNC: 32.4 G/DL (ref 31.5–35.7)
MCV RBC AUTO: 86.4 FL (ref 79–97)
MICRO URNS: NORMAL
MICRO URNS: NORMAL
MONOCYTES # BLD AUTO: 0.52 10*3/MM3 (ref 0.1–0.9)
MONOCYTES NFR BLD AUTO: 10.7 % (ref 5–12)
NEUTROPHILS # BLD AUTO: 2.36 10*3/MM3 (ref 1.7–7)
NEUTROPHILS NFR BLD AUTO: 48.9 % (ref 42.7–76)
NITRITE UR QL STRIP: NEGATIVE
NRBC BLD AUTO-RTO: 0 /100 WBC (ref 0–0.2)
PH UR STRIP: 6.5 [PH] (ref 5–7.5)
PLATELET # BLD AUTO: 196 10*3/MM3 (ref 140–450)
POTASSIUM SERPL-SCNC: 5 MMOL/L (ref 3.5–5.2)
PROT SERPL-MCNC: 6.5 G/DL (ref 6–8.5)
PROT UR QL STRIP: NEGATIVE
RBC # BLD AUTO: 4.18 10*6/MM3 (ref 3.77–5.28)
RBC #/AREA URNS HPF: NORMAL /HPF (ref 0–2)
SODIUM SERPL-SCNC: 145 MMOL/L (ref 136–145)
SP GR UR STRIP: 1.01 (ref 1–1.03)
TIBC SERPL-MCNC: 520 MCG/DL
TRIGL SERPL-MCNC: 97 MG/DL (ref 0–150)
TSH SERPL DL<=0.005 MIU/L-ACNC: 2.2 UIU/ML (ref 0.27–4.2)
UIBC SERPL-MCNC: 475 MCG/DL (ref 112–346)
URINALYSIS REFLEX: NORMAL
UROBILINOGEN UR STRIP-MCNC: 0.2 MG/DL (ref 0.2–1)
VLDLC SERPL CALC-MCNC: 18 MG/DL (ref 5–40)
WBC # BLD AUTO: 4.84 10*3/MM3 (ref 3.4–10.8)
WBC #/AREA URNS HPF: NORMAL /HPF (ref 0–5)

## 2025-04-22 ENCOUNTER — OFFICE VISIT (OUTPATIENT)
Dept: INTERNAL MEDICINE | Facility: CLINIC | Age: 70
End: 2025-04-22
Payer: MEDICARE

## 2025-04-22 VITALS
HEIGHT: 67 IN | BODY MASS INDEX: 30.54 KG/M2 | HEART RATE: 54 BPM | TEMPERATURE: 98 F | OXYGEN SATURATION: 98 % | SYSTOLIC BLOOD PRESSURE: 110 MMHG | WEIGHT: 194.6 LBS | DIASTOLIC BLOOD PRESSURE: 66 MMHG

## 2025-04-22 DIAGNOSIS — Z00.01 ENCOUNTER FOR GENERAL ADULT MEDICAL EXAMINATION WITH ABNORMAL FINDINGS: Primary | ICD-10-CM

## 2025-04-22 DIAGNOSIS — D64.9 LOW HEMOGLOBIN: ICD-10-CM

## 2025-04-22 DIAGNOSIS — M50.90 DISC DISORDER OF CERVICAL REGION: ICD-10-CM

## 2025-04-22 DIAGNOSIS — Z86.2 HISTORY OF IRON DEFICIENCY ANEMIA: ICD-10-CM

## 2025-04-22 DIAGNOSIS — M19.042 PRIMARY OSTEOARTHRITIS OF BOTH HANDS: ICD-10-CM

## 2025-04-22 DIAGNOSIS — M17.0 PRIMARY OSTEOARTHRITIS OF BOTH KNEES: ICD-10-CM

## 2025-04-22 DIAGNOSIS — M16.0 PRIMARY OSTEOARTHRITIS OF BOTH HIPS: ICD-10-CM

## 2025-04-22 DIAGNOSIS — G25.0 ESSENTIAL TREMOR: ICD-10-CM

## 2025-04-22 DIAGNOSIS — F43.22 ADJUSTMENT DISORDER WITH ANXIOUS MOOD: ICD-10-CM

## 2025-04-22 DIAGNOSIS — Z86.69 HISTORY OF PERIPHERAL NEUROPATHY: ICD-10-CM

## 2025-04-22 DIAGNOSIS — I65.23 CAROTID ARTERY PLAQUE, BILATERAL: ICD-10-CM

## 2025-04-22 DIAGNOSIS — M85.859 OSTEOPENIA OF HIP, UNSPECIFIED LATERALITY: ICD-10-CM

## 2025-04-22 DIAGNOSIS — D64.9 POSTOPERATIVE ANEMIA: ICD-10-CM

## 2025-04-22 DIAGNOSIS — Z00.00 ENCOUNTER FOR SUBSEQUENT ANNUAL WELLNESS VISIT (AWV) IN MEDICARE PATIENT: ICD-10-CM

## 2025-04-22 DIAGNOSIS — E78.5 DYSLIPIDEMIA: ICD-10-CM

## 2025-04-22 DIAGNOSIS — M19.041 PRIMARY OSTEOARTHRITIS OF BOTH HANDS: ICD-10-CM

## 2025-04-22 DIAGNOSIS — K21.9 GASTROESOPHAGEAL REFLUX DISEASE WITHOUT ESOPHAGITIS: ICD-10-CM

## 2025-04-22 DIAGNOSIS — R73.01 IFG (IMPAIRED FASTING GLUCOSE): ICD-10-CM

## 2025-04-22 RX ORDER — OMEPRAZOLE 20 MG/1
20 CAPSULE, DELAYED RELEASE ORAL DAILY
Qty: 90 CAPSULE | Refills: 2 | Status: SHIPPED | OUTPATIENT
Start: 2025-04-22

## 2025-04-22 RX ORDER — FERROUS GLUCONATE 324(38)MG
324 TABLET ORAL
Start: 2025-04-22

## 2025-04-22 RX ORDER — PROPRANOLOL HCL 20 MG
20 TABLET ORAL 3 TIMES DAILY PRN
COMMUNITY
Start: 2025-03-12

## 2025-04-22 RX ORDER — MAGNESIUM CARB/ALUMINUM HYDROX 105-160MG
TABLET,CHEWABLE ORAL
COMMUNITY

## 2025-04-22 NOTE — PATIENT INSTRUCTIONS
Flu/. COVID/. RSV in 2025  Tdap and Prevnar 20 at your convenience at Rx      Medicare Wellness  Personal Prevention Plan of Service     Date of Office Visit:    Encounter Provider:  Levon Barber MD  Place of Service:  [facility]  Patient Name: Noemi Fowler  :  1955    As part of the Medicare Wellness portion of your visit today, we are providing you with this personalized preventive plan of services (PPPS). This plan is based upon recommendations of the [place] Preventive Services Task Force (USPSTF) and the Advisory Committee on Immunization Practices (ACIP).    This lists the preventive care services that should be considered, and provides dates of when you are due. Items listed as completed are up-to-date and do not require any further intervention.    Health Maintenance   Topic Date Due    DXA SCAN  05/10/2023    COVID-19 Vaccine ( season) 2025    INFLUENZA VACCINE  2025    TDAP/TD VACCINES (3 - Td or Tdap) 2025    ANNUAL WELLNESS VISIT  2026    MAMMOGRAM  2026    COLORECTAL CANCER SCREENING  2030    HEPATITIS C SCREENING  Completed    Pneumococcal Vaccine 50+  Completed    ZOSTER VACCINE  Completed       No orders of the defined types were placed in this encounter.      Return in about 6 months (around 10/22/2025) for Recheck.

## 2025-04-22 NOTE — PROGRESS NOTES
Subjective   Noemi Fowler is a 69 y.o. female who presents for a Medicare Well Visit    Patient Care Team:  Levon Barber MD as PCP - General  Levon Barber MD as PCP - Family Medicine    Recent Hospitalizations: Recently treated at the following:  [facility]    Depression Screen:       2025     1:16 PM   PHQ-2/PHQ-9 Depression Screening   Little interest or pleasure in doing things Not at all   Feeling down, depressed, or hopeless Not at all   How difficult have these problems made it for you to do your work, take care of things at home, or get along with other people? Not difficult at all       Functional and Cognitive Screenin/22/2025     1:14 PM   Functional & Cognitive Status   Do you have difficulty preparing food and eating? No   Do you have difficulty bathing yourself, getting dressed or grooming yourself? No   Do you have difficulty using the toilet? No   Do you have difficulty moving around from place to place? No   Do you have trouble with steps or getting out of a bed or a chair? No   Current Diet Unhealthy Diet   Dental Exam Up to date   Eye Exam Up to date   Exercise (times per week) 5 times per week   Current Exercises Include Aerobics;Gardening;Home Exercise Program (TV, Computer, Etc.);Walking;Yard Work   Do you need help using the phone?  No   Are you deaf or do you have serious difficulty hearing?  No   Do you need help to go to places out of walking distance? No   Do you need help shopping? No   Do you need help preparing meals?  No   Do you need help with housework?  No   Do you need help with laundry? No   Do you need help taking your medications? No   Do you need help managing money? No   Do you ever drive or ride in a car without wearing a seat belt? No   Have you felt unusual stress, anger or loneliness in the last month? No   Who do you live with? Spouse   If you need help, do you have trouble finding someone available to you? No   Have you been bothered in the last four  "weeks by sexual problems? No   Do you have difficulty concentrating, remembering or making decisions? No       Does the patient have evidence of cognitive impairment? no    No opioid medication identified on active medication list. I have reviewed chart for other potential  high risk medication/s and harmful drug interactions in the elderly.          Does not need ASA (and currently is not on it)    Vitals:    04/22/25 1347   BP: 110/66   BP Location: Left arm   Patient Position: Sitting   Cuff Size: Adult   Pulse: 54   Temp: 98 °F (36.7 °C)   TempSrc: Infrared   SpO2: 98%   Weight: 88.3 kg (194 lb 9.6 oz)   Height: 170.2 cm (67.01\")   PainSc: 0-No pain       Body mass index is 30.47 kg/m².    Visual Acuity:  No results found.    Advanced Care Planning:  ACP discussion was held with the patient during this visit. Patient has an advance directive in EMR which is still valid.     Compared to one year ago, the patient feels physical health is better.  Compared to one year ago, the patient feels mental health is better.    Reviewed chart for potential of high risk medication in the elderly: yes  Reviewed chart for potential of harmful drug interactions in the elderly:yes    Identification of Risk Factors:  Risk factors include: Advance Directive Discussion  Breast Cancer/Mammogram Screening  Cardiovascular risk  Colon Cancer Screening  Depression/Dysphoria  Diabetic Lab Screening   Glaucoma Risk  Immunizations Discussed/Encouraged (specific immunizations; Tdap, Hepatitis A Vaccine/Series, Influenza, Pneumococcal 23, Prevnar 20 (Pneumococcal 20-valent conjugate), Shingrix, COVID19, and RSV (Respiratory Syncytial Virus) )  Obesity/Overweight .    Patient Self-Management and Personalized Health Advice  The patient has been provided with information about: diet, exercise, weight management, the relationship between weight and GERD, and mental health concerns and preventive services including:   Annual Wellness Visit " "(AWV)  Bone Density Measurements  Colorectal Cancer Screening, Colonoscopy  Screening Mammography .  Follow Up: Medicare visit in one year    Discussed the patient's BMI with her. The BMI is above average; BMI management plan is completed.    Patient has multiple medical problems which are significant and separately identifiable that require additional work above and beyond the Medicare Wellness Visit. These are not trivial or insignificant and are billed with a 25-modifier    Chief Complaint   Patient presents with    Medicare Wellness-subsequent     Review of medical issues       HPI:  Noemi Fowler is a 69 y.o. female RTC in yearly CPE/ AWV, review of medical issues:   1. Hx of LESLI noted after acute bowel change issues ~10/2022 - bowels have largely returned to normal.  Patient did have 2 surgeries in 2025 with bilateral hip replacement.  He is not on oral iron at this time.    2. Adjustment D/O with panic event and new onset HA - overall much better with work with counselor/ family therapy.  \"100% better\" now, getting along better with son.  \"He has come around. Seeing grandkids all the time; he is in a fantastic mood'.  \"That has helped\".   3. Overweight/ IFG - weight has 'drifted up. I have not been able to move with the hips'. Planning to get more active. Diet has been 'decent'.   esolved A1C on labs with noted intentional weight loss. C/W TLC mods.  4. Dyslipidemia, noted issue of right ICA mild plaque and left ICA very mild plaque no stenosis on CT angio 4/2022 and MRI brain with chronic small vessel ischemic changes; LDL peak at 212 - on 5 mg rosuvastatin, no issues.  No myalgias.    5. Abdominal bloating/ fullness, nausea s/p lap cristiano and appy; subsequent mild gastritis and Schatzki's ring/ spastic esophagus on EGD in 2017- no issues. No sx at all.  6. Essential Tremor, L > R - dx'd in 2015, after (-) DAMIAN scan, mild progression - S/P eval with Dr. Wilhelm in neuro annually, last 10/2024.  Was not " "compliant with TID propranolol due to meloxicam.  Now up to BID and then TID in last month and 'jury still out. Hand still shakes like crazy when I am holding something'.  Will see him upcoming.   7. Neuropathy, tingling on whole L side of body - s/p extensive w/u with Neurology, unclear etiology. Remains off Gabapentin for lack of benefit.  Has changed, 'not experiencing nearly as much'.  Had thought as much about it until recently. \"It is there but not really there. Small amount. I think related to my hips'.   8. OA, in hands and knees/ shoulders; B hip OA  - s/p B THR over last 6 months, released from PT. Really did well.  Rest of joints OK.  Sees Dr. FULTON in 5/2025 for f/u. Pleased with progress ovewrall, 'I think it was all due to the hips'.  H minimal sx, not limiting. Voltaren gel PRN and rare Tylenol.   9.  - C-scope 12/2015 (-) -->C-scope 7/2020 (1 hyperplastic polyp) --> 10 years; Sees Gyn in Fall, last ~9/2024.     Review of Systems   Constitutional: Positive for weight gain. Negative for chills, fever and malaise/fatigue.   HENT:  Negative for congestion, hearing loss, odynophagia and sore throat.    Eyes:  Negative for discharge, double vision, pain and redness.        Last eye exam 5/2025; no issues.      Cardiovascular:  Negative for chest pain, dyspnea on exertion, irregular heartbeat, leg swelling, near-syncope, palpitations and syncope.   Respiratory:  Positive for snoring ('a little', no gasping or waking noted). Negative for cough, shortness of breath and sleep disturbances due to breathing.    Endocrine: Negative for polydipsia, polyphagia and polyuria.   Hematologic/Lymphatic: Negative for bleeding problem. Does not bruise/bleed easily.   Skin:  Positive for poor wound healing (RTHR wound, had to take 2 rounds of Abx to get to close fully. Resolved now.).   Musculoskeletal:  Positive for arthritis. Negative for joint pain, joint swelling, muscle cramps, muscle weakness and myalgias. " "  Gastrointestinal:  Negative for constipation, diarrhea, dysphagia, heartburn, nausea and vomiting.   Genitourinary:  Positive for bladder incontinence ('still am, I think taht will change as Iget back into my exercise'). Negative for dysuria, frequency, hematuria, hesitancy and incomplete emptying.   Neurological:  Negative for dizziness, headaches and light-headedness.   Psychiatric/Behavioral:  Negative for depression. The patient does not have insomnia and is not nervous/anxious.    Allergic/Immunologic: Positive for environmental allergies (\"Pollen\", no meds needed). Negative for persistent infections.             @OBJECTIVE BEGIN@  Vitals:    04/22/25 1347   BP: 110/66   Pulse: 54   Temp: 98 °F (36.7 °C)   SpO2: 98%     Physical Exam  Vitals reviewed.   Constitutional:       General: She is not in acute distress.     Appearance: Normal appearance. She is well-developed. She is not ill-appearing or toxic-appearing.   HENT:      Head: Normocephalic and atraumatic.      Right Ear: Hearing, tympanic membrane, ear canal and external ear normal. There is no impacted cerumen.      Left Ear: Hearing, tympanic membrane, ear canal and external ear normal. There is no impacted cerumen.      Nose: Nose normal.      Mouth/Throat:      Mouth: Mucous membranes are moist.      Dentition: Has dentures.      Pharynx: Oropharynx is clear. Uvula midline. No pharyngeal swelling, oropharyngeal exudate, posterior oropharyngeal erythema or uvula swelling.   Eyes:      General: Lids are normal. No scleral icterus.     Extraocular Movements: Extraocular movements intact.      Conjunctiva/sclera: Conjunctivae normal.      Pupils: Pupils are equal, round, and reactive to light.   Neck:      Thyroid: No thyroid mass or thyromegaly.      Vascular: No carotid bruit.      Trachea: Trachea normal.   Cardiovascular:      Rate and Rhythm: Normal rate and regular rhythm.      Pulses:           Radial pulses are 2+ on the right side and 2+ on " the left side.        Dorsalis pedis pulses are 2+ on the right side and 2+ on the left side.        Posterior tibial pulses are 2+ on the right side and 2+ on the left side.      Heart sounds: Normal heart sounds, S1 normal and S2 normal. No murmur heard.     No friction rub. No gallop.   Pulmonary:      Effort: Pulmonary effort is normal. No respiratory distress.      Breath sounds: Normal breath sounds. No wheezing, rhonchi or rales.   Abdominal:      General: Bowel sounds are normal. There is no distension.      Palpations: Abdomen is soft. There is no mass.      Tenderness: There is no abdominal tenderness. There is no guarding or rebound.   Musculoskeletal:         General: Normal range of motion.      Right shoulder: No tenderness, bony tenderness or crepitus. Normal range of motion.      Left shoulder: No tenderness, bony tenderness or crepitus. Normal range of motion.      Right hand: Deformity (Mild DIP joint hypertrophy) present. No tenderness or bony tenderness. Normal range of motion. Normal strength.      Left hand: Deformity (Mild DIP joint hypertrophy) present. No tenderness or bony tenderness. Normal range of motion. Normal strength.      Cervical back: Full passive range of motion without pain, normal range of motion and neck supple.      Right lower leg: No edema.      Left lower leg: No edema.      Right foot: Normal range of motion. No deformity or bunion.      Left foot: Normal range of motion. No deformity or bunion.   Feet:      Right foot:      Skin integrity: No ulcer, blister or skin breakdown.      Left foot:      Skin integrity: No ulcer, blister or skin breakdown.   Lymphadenopathy:      Cervical: No cervical adenopathy.      Right cervical: No superficial, deep or posterior cervical adenopathy.     Left cervical: No superficial, deep or posterior cervical adenopathy.      Upper Body:      Right upper body: No supraclavicular, axillary or pectoral adenopathy.      Left upper body: No  supraclavicular, axillary or pectoral adenopathy.      Lower Body: No right inguinal adenopathy. No left inguinal adenopathy.   Skin:     General: Skin is warm and dry.      Findings: No rash.   Neurological:      Mental Status: She is alert and oriented to person, place, and time.      Cranial Nerves: No cranial nerve deficit.      Sensory: No sensory deficit.      Motor: No weakness, tremor, atrophy or abnormal muscle tone.      Gait: Gait normal.      Deep Tendon Reflexes: Reflexes are normal and symmetric.      Reflex Scores:       Patellar reflexes are 2+ on the right side and 2+ on the left side.       Achilles reflexes are 2+ on the right side and 2+ on the left side.  Psychiatric:         Mood and Affect: Mood normal.         Behavior: Behavior normal.         Thought Content: Thought content normal.           Assessment & Plan   Diagnoses and all orders for this visit:    1. Encounter for general adult medical examination with abnormal findings (Primary)    2. Encounter for subsequent annual wellness visit (AWV) in Medicare patient    3. Gastroesophageal reflux disease without esophagitis  -     omeprazole (priLOSEC) 20 MG capsule; Take 1 capsule by mouth Daily.  Dispense: 90 capsule; Refill: 2    4. Adjustment disorder with anxious mood    5. Carotid artery plaque, bilateral    6. Disc disorder of cervical region    7. Dyslipidemia    8. Essential tremor    9. History of iron deficiency anemia  -     ferrous gluconate (FERGON) 324 MG tablet; Take 1 tablet by mouth Daily With Breakfast.    10. History of peripheral neuropathy    11. IFG (impaired fasting glucose)    12. Osteopenia of hip, unspecified laterality    13. Primary osteoarthritis of both hips    14. Primary osteoarthritis of both knees    15. Primary osteoarthritis of both hands    16. Low hemoglobin  -     ferrous gluconate (FERGON) 324 MG tablet; Take 1 tablet by mouth Daily With Breakfast.    17. Postoperative anemia  -     ferrous gluconate  (FERGON) 324 MG tablet; Take 1 tablet by mouth Daily With Breakfast.        Diagnoses and all orders for this visit:    Encounter for general adult medical examination with abnormal findings    Encounter for subsequent annual wellness visit (AWV) in Medicare patient    Gastroesophageal reflux disease without esophagitis  -     omeprazole (priLOSEC) 20 MG capsule; Take 1 capsule by mouth Daily.    Adjustment disorder with anxious mood    Carotid artery plaque, bilateral    Disc disorder of cervical region    Dyslipidemia    Essential tremor    History of iron deficiency anemia  -     ferrous gluconate (FERGON) 324 MG tablet; Take 1 tablet by mouth Daily With Breakfast.    History of peripheral neuropathy    IFG (impaired fasting glucose)    Osteopenia of hip, unspecified laterality    Primary osteoarthritis of both hips    Primary osteoarthritis of both knees    Primary osteoarthritis of both hands    Low hemoglobin  -     ferrous gluconate (FERGON) 324 MG tablet; Take 1 tablet by mouth Daily With Breakfast.    Postoperative anemia  -     ferrous gluconate (FERGON) 324 MG tablet; Take 1 tablet by mouth Daily With Breakfast.    Other orders  -     propranolol (INDERAL) 20 MG tablet; Take 1 tablet by mouth 3 (Three) Times a Day As Needed (Tremors).  -     Glucosamine-Chondroitin 750-600 MG tablet; Take  by mouth.        Noemi HINDS Fowler is a 69 y.o. female RTC in yearly CPE/ AWV, review of medical issues:   1. Hx of LESLI noted after acute bowel change issues ~10/2022 -recurrence of low hemoglobin today, however is ~6 6 weeks postop from right THR.  Mild iron deficiency noted.  Suspect postoperative anemia.  Start ferrous gluconate 3 and 24 mg daily with OTC vitamin C.  Trend CBC and iron stores next labs.   2. Adjustment D/O with panic event 2023- overall much better with work with counselor/ family therapy.  Mood is better and stressors reduced, pt feeling well.   3. Overweight/ IFG - weight elevated with decline  activity postop in 2025.  A1c remains normal.  Restart exercise and C/W TLC diet.  Trend; weight loss advised.  4. Dyslipidemia, noted issue of right ICA mild plaque and left ICA very mild plaque no stenosis on CT angio 4/2022 and MRI brain with chronic small vessel ischemic changes; LDL peak at 212 -LDL improved to 83 on on 5 mg rosuvastatin, intolerant of higher doses.  Given at goal >50% from baseline, will defer trial of alternate statin at this time.    5. Abdominal bloating/ fullness, nausea s/p lap cristiano and appy; subsequent mild gastritis and Schatzki's ring/ spastic esophagus on EGD in 2017- S/P eval with Dr. Arredondo 11/2021.  Pt did not take Abx for SIBO, sx resolved off large volume popcorn intake.  SX fully controlled on PPI Rx dose daily; recent genetic testing showed omeprazole slow metabolizer.  Desires trial of lower dosing 20 mg, E Rx today.  Knows to call if SX recurs.    6. Essential Tremor, L > R - dx'd in 2015, after (-) DAMIAN scan, mild progression - S/P eval with Dr. Wilhelm in neuro annually, last 10/2024.  Partial response to propranolol initiation.  Consideration of primidone; will see neuro later this week.   7. Neuropathy, tingling on whole L side of body - s/p extensive w/u with Neurology, unclear etiology. Remains off Gabapentin for lack of benefit.  Markedly improved over the last year or two, patient unsure why.  8. Cervical spine DJD/ DDD s/p fusion at C5-C6 and s/p epidurals in past - [facility].   9. OA, in hands and knees/ shoulders; B hip OA  - s/p B THR, left 10/1/2025 and right and 3/2025.  Doing well with minimal pain, released from PT.  F/U Ortho 5/2025.  --> Alkaline phosphatase elevation -suspect due to postoperative state, THR on right.  Reassured patient.  Trend next labs with BSAP and GGT.  10. Urge Incontinence - requiring pad.  Progression with weight gain.  Declined PT trial in past but will consider home exercises works on weight loss.  UA clear today.    11. HM - labs d/w pt;  Flu/ Hep A/ Zostavax/ Prevnar/ Pvax/ Shingrix/ COVID - UTD; Tdap and Prevnar 20 at Rx; flu/COVID/RSV due this fall; C-scope 12/2015 (-) -->C-scope 7/2020 (1 hyperplastic polyp) --> 10 years; Pap UTD 9/2024 and Mammo UTD 9/2024 with Gyn; DEXA normal 7/2016 per Gyn --> mild osteopenia hip 5/2021 --> DEXA per Gyn; Hep C Ab (-) 2014; AAA (-) on 2015 CT A/P and U/S in 10/2019; c/w exercise; Preventative care plan provided to pt at end of visit    Return in about 6 months (around 10/22/2025) for Recheck. (CMP, A1C, CBC, iron panel, ferritin, BSAP, GGT)          Current Outpatient Medications:     acetaminophen (TYLENOL) 500 MG tablet, 2 tabs PO Q 8 hours PRN, Disp: , Rfl:     Ascorbic Acid (VITAMIN C PO), Take 1 tablet by mouth Daily. 50 mg, Disp: , Rfl:     Calcium Carb-Cholecalciferol (CALCIUM + D3) 600-200 MG-UNIT tablet, Take  by mouth., Disp: , Rfl:     cholecalciferol (VITAMIN D3) 25 MCG (1000 UT) tablet, Take 1,000 Units by mouth Daily., Disp: , Rfl:     Glucosamine-Chondroitin 750-600 MG tablet, Take  by mouth., Disp: , Rfl:     Misc Natural Products (GLUCOS-CHONDROIT-MSM COMPLEX) tablet, Take  by mouth., Disp: , Rfl:     propranolol (INDERAL) 20 MG tablet, Take 1 tablet by mouth 3 (Three) Times a Day As Needed (Tremors)., Disp: , Rfl:     rosuvastatin (CRESTOR) 5 MG tablet, TAKE 1 TABLET BY MOUTH EVERY DAY, Disp: 90 tablet, Rfl: 0    ferrous gluconate (FERGON) 324 MG tablet, Take 1 tablet by mouth Daily With Breakfast., Disp: , Rfl:     omeprazole (priLOSEC) 20 MG capsule, Take 1 capsule by mouth Daily., Disp: 90 capsule, Rfl: 2

## 2025-04-28 ENCOUNTER — TELEPHONE (OUTPATIENT)
Dept: INTERNAL MEDICINE | Facility: CLINIC | Age: 70
End: 2025-04-28

## 2025-04-28 NOTE — TELEPHONE ENCOUNTER
Pt is scheduled to see Dr. Moore tomorrow, 4/29/2025, at 7:45 a.m- Dr. Barber had no availabilities.

## 2025-04-28 NOTE — TELEPHONE ENCOUNTER
[facility] staff attempted to follow warm transfer process and was unsuccessful     Caller: Noemi Fowler    Relationship to patient: Self    Best call back number: [phone]     Patient is needing:   PATIENT BELIEVES SHE HAS UTI-BURNING AND FREQUENCY  PLEASE CALL TO SCHEDULE WITH ANYONE

## 2025-04-29 ENCOUNTER — OFFICE VISIT (OUTPATIENT)
Dept: INTERNAL MEDICINE | Facility: CLINIC | Age: 70
End: 2025-04-29
Payer: MEDICARE

## 2025-04-29 VITALS
WEIGHT: 194.3 LBS | DIASTOLIC BLOOD PRESSURE: 76 MMHG | BODY MASS INDEX: 30.49 KG/M2 | SYSTOLIC BLOOD PRESSURE: 122 MMHG | OXYGEN SATURATION: 97 % | HEIGHT: 67 IN | HEART RATE: 76 BPM

## 2025-04-29 DIAGNOSIS — R35.0 FREQUENCY OF URINATION: Primary | ICD-10-CM

## 2025-04-29 DIAGNOSIS — R31.9 HEMATURIA, UNSPECIFIED TYPE: ICD-10-CM

## 2025-04-29 LAB
BILIRUB BLD-MCNC: NEGATIVE MG/DL
CLARITY, POC: CLEAR
COLOR UR: YELLOW
EXPIRATION DATE: ABNORMAL
GLUCOSE UR STRIP-MCNC: NEGATIVE MG/DL
KETONES UR QL: ABNORMAL
LEUKOCYTE EST, POC: ABNORMAL
Lab: ABNORMAL
NITRITE UR-MCNC: POSITIVE MG/ML
PH UR: 7.5 [PH] (ref 5–8)
PROT UR STRIP-MCNC: ABNORMAL MG/DL
RBC # UR STRIP: ABNORMAL /UL
SP GR UR: 1.01 (ref 1–1.03)
UROBILINOGEN UR QL: ABNORMAL

## 2025-04-29 PROCEDURE — 99213 OFFICE O/P EST LOW 20 MIN: CPT | Performed by: STUDENT IN AN ORGANIZED HEALTH CARE EDUCATION/TRAINING PROGRAM

## 2025-04-29 PROCEDURE — 81003 URINALYSIS AUTO W/O SCOPE: CPT | Performed by: STUDENT IN AN ORGANIZED HEALTH CARE EDUCATION/TRAINING PROGRAM

## 2025-04-29 PROCEDURE — 1126F AMNT PAIN NOTED NONE PRSNT: CPT | Performed by: STUDENT IN AN ORGANIZED HEALTH CARE EDUCATION/TRAINING PROGRAM

## 2025-04-29 RX ORDER — NITROFURANTOIN 25; 75 MG/1; MG/1
100 CAPSULE ORAL 2 TIMES DAILY
Qty: 14 CAPSULE | Refills: 0 | Status: SHIPPED | OUTPATIENT
Start: 2025-04-29 | End: 2025-05-06

## 2025-04-29 RX ORDER — PHENAZOPYRIDINE HYDROCHLORIDE 95 MG/1
95 TABLET ORAL 3 TIMES DAILY PRN
Qty: 6 TABLET | Refills: 0 | Status: SHIPPED | OUTPATIENT
Start: 2025-04-29 | End: 2025-05-01

## 2025-04-29 NOTE — PROGRESS NOTES
Charbel Moore M.D.  Internal Medicine  [facility]  [address], [address]  [place] [address]  [phone]      Chief Complaint  Urinary Frequency    SUBJECTIVE    History of Present Illness    Noemi Fowler is a 69 y.o. female who presents to the office today as an established patient of Dr Levon Barber MD here today for an acute care visit.     Per chart review history of urge Incontinence. UA clear at recent PCP visit  History of Present Illness  The patient came in today because they think they have a urinary tract infection (UTI). They've been feeling a burning sensation when they pee for the last 2 days and have had UTIs before, so they figured that's what it is. They've also noticed they're needing to pee more often. They don't have any flank pain, urine malodor, vaginal discharge, discomfort, fever, or chills. The patient wondered if their plastic shower bench might be causing the UTI, even though they always rinse it before use and keep it clean. They also mentioned they might not be drinking enough water. They don't have any allergies to antibiotics and haven't had a UTI in a while.        Review of Systems    No Known Allergies     Outpatient Medications Marked as Taking for the 4/29/25 encounter (Office Visit) with Charbel Moore MD   Medication Sig Dispense Refill    acetaminophen (TYLENOL) 500 MG tablet 2 tabs PO Q 8 hours PRN      Ascorbic Acid (VITAMIN C PO) Take 1 tablet by mouth Daily. 50 mg      Calcium Carb-Cholecalciferol (CALCIUM + D3) 600-200 MG-UNIT tablet Take  by mouth.      cholecalciferol (VITAMIN D3) 25 MCG (1000 UT) tablet Take 1,000 Units by mouth Daily.      ferrous gluconate (FERGON) 324 MG tablet Take 1 tablet by mouth Daily With Breakfast.      Glucosamine-Chondroitin 750-600 MG tablet Take  by mouth.      Misc Natural Products (GLUCOS-CHONDROIT-MSM COMPLEX) tablet Take  by mouth.      omeprazole (priLOSEC) 20 MG capsule Take 1 capsule by mouth  Daily. 90 capsule 2    propranolol (INDERAL) 20 MG tablet Take 1 tablet by mouth 3 (Three) Times a Day As Needed (Tremors).      rosuvastatin (CRESTOR) 5 MG tablet TAKE 1 TABLET BY MOUTH EVERY DAY 90 tablet 0        Past Medical History:   Diagnosis Date    Abnormal LFTs     Abnormal liver function tests 10/11/2016    Adjustment disorder with anxious mood 05/03/2022    Stemming from stressors with son.  In family therapy. Improving over 2022.  Short course low dose TCA for sleep.   Resolved in 2024 with improved relationship with son      Carotid artery plaque     lifeline screen only not noted on formal screen at [facility] 2014    Carotid atherosclerosis 10/11/2016    Description: Lifeline screen only; Not noted on formal screen at [facility] 2014    Cataract     surgery in 2012 with lens replacement    Cervical disc disorder, unspecified, unspecified cervical region     djd/ddd c5-c6 fusion epiderals    Cholelithiasis     Gall bladder removed in 2016    Colon polyp     Colonoscopy 2020    COVID-19 08/07/2023    Essential tremor 10/17/2016    Gastroesophageal reflux disease without esophagitis 04/21/2017    With noted Schatzki ring and esophageal spasm in 2017 EGD    History of peripheral neuropathy     sees Dr. Mantilla sx only on L side    Liver hemangioma     on CT scan x 2 in 2015    Localized osteoarthritis of hand     Mucocele, appendix     Neuromuscular disorder     peripheral neuropathy    Osteoarthritis, localized, knee     Osteopenia     noted on lifeline screen not seen on formal hip/L-spine DEXA in 11/2014    Pneumonia of right lower lobe due to infectious organism 03/20/2017    3/2017, resolved.     PONV (postoperative nausea and vomiting)     Post herpetic neuralgia     Postherpetic neuralgia 10/17/2016    Schatzki's ring 04/21/2017    On EGD 2017, s/p dilatation    Subclinical hypothyroidism 12/27/2019    New issue on 12/2019 labs    Tremor     essential tremor    Trochanteric bursitis of both hips  12/30/2020    S/p injx in past, temporary relief    Urge incontinence of urine     Urinary tract infection     Vertigo 12/2021    MRI brain negative.      Past Surgical History:   Procedure Laterality Date    APPENDECTOMY      11/11/15 due to mucocoele    CARDIAC CATHETERIZATION  2014    everything was fine    CATARACT EXTRACTION, BILATERAL  2012    with lens replacement B    CERVICAL FUSION      CHOLECYSTECTOMY  2016    COLONOSCOPY  2016. 2020    ENDOSCOPY N/A 01/27/2017    Procedure: ESOPHAGOGASTRODUODENOSCOPY WITH DILATATION GONZALEZ 52 FR,WITH BIOPSY;  Surgeon: Kofi Arredondo MD;  Location: [facility] ENDOSCOPY;  Service:     EYE SURGERY      cataract surgery B 2012    GALLBLADDER SURGERY      02/2016    HERNIA REPAIR  1990    Cystocoele, Rectocoela, Enterocoele    HYSTERECTOMY      pt has ovaries MARION only    JOINT REPLACEMENT  1/3/2025 and 3/23/2025    Left and then right hip replacement    LASIK      bilateral    OSTECTOMY      navicular    SKIN BIOPSY      SOFT TISSUE CYST EXCISION      SUBTOTAL HYSTERECTOMY  1989    TOTAL HIP ARTHROPLASTY Left 01/23/2025    Dr. FULTON    TOTAL HIP ARTHROPLASTY Right 03/03/2025    Dr. FULTON     Family History   Problem Relation Age of Onset    Hypertension Mother     Hypothyroidism Mother     Dementia Mother     Diabetes Mother         Type 2    Hyperlipidemia Mother     Stroke Mother     Thyroid disease Mother     Leukemia Father         chronic lymphocytic    Hypertension Father     Alcohol abuse Father     Cancer Father         CLL, Liver    Hyperlipidemia Father     Vision loss Father         Glaucoma    Breast cancer Sister     Hypertension Sister     Stroke Sister     Alcohol abuse Sister         with cirrhosis, x 1 sister    Depression Brother     Hypertension Brother     Colon cancer Maternal Grandmother     Cancer Maternal Grandmother         colon    Diabetes Maternal Grandmother         Type 2    Hyperlipidemia Maternal Grandmother     Hypertension Maternal Grandmother     No  "Known Problems Son     Diabetes Paternal Grandfather         Type 1    Hyperlipidemia Paternal Grandfather     Hypertension Paternal Grandfather     Stroke Paternal Grandfather     Alcohol abuse Sister     Hyperlipidemia Sister     Hypertension Sister     Alcohol abuse Brother     Hyperlipidemia Brother     Hypertension Brother     Arthritis Maternal Aunt     Cancer Sister         breast    Hypertension Sister     Stroke Sister     Thyroid disease Sister     Vision loss Sister         Glaucoma    COPD Maternal Aunt     COPD Maternal Uncle     Diabetes Paternal Aunt         Type 1    Diabetes Paternal Uncle         Type 1    Stroke Paternal Uncle     Hyperlipidemia Brother     Hypertension Brother     Depression Brother     Stroke Paternal Grandmother     Cancer Sister         breast    Hypertension Sister     Stroke Sister     Thyroid disease Sister     Vision loss Sister         Glaucoma    reports that she quit smoking about 46 years ago. Her smoking use included cigarettes. She started smoking about 51 years ago. She has a 5 pack-year smoking history. She has been exposed to tobacco smoke. She has never used smokeless tobacco. She reports current alcohol use of about 5.0 - 7.0 standard drinks of alcohol per week. She reports that she does not use drugs.    OBJECTIVE    Vital Signs:   /76   Pulse 76   Ht 170.2 cm (67.01\")   Wt 88.1 kg (194 lb 4.8 oz)   SpO2 97%   BMI 30.42 kg/m²     Physical Exam  Constitutional:       Appearance: Normal appearance.   Cardiovascular:      Rate and Rhythm: Normal rate and regular rhythm.      Heart sounds: Normal heart sounds. No murmur heard.  Pulmonary:      Effort: Pulmonary effort is normal.      Breath sounds: Normal breath sounds.   Abdominal:      General: Abdomen is flat. There is no distension.      Palpations: Abdomen is soft.      Tenderness: There is no abdominal tenderness. There is no right CVA tenderness or left CVA tenderness.   Skin:     General: Skin is " warm and dry.   Neurological:      Mental Status: She is alert.   Psychiatric:         Mood and Affect: Mood normal.         Behavior: Behavior normal.         Thought Content: Thought content normal.          Physical Exam      The following data was reviewed by: Charbel Moore MD on 04/29/2025:  CMP          4/3/2025    09:18   CMP   Glucose 100    BUN 17    Creatinine 0.74    EGFR 87.2    Sodium 145    Potassium 5.0    Chloride 107    Calcium 9.4    Total Protein 6.5    Albumin 4.3    Globulin 2.2    Total Bilirubin 0.4    Alkaline Phosphatase 164    AST (SGOT) 18    ALT (SGPT) 15    Albumin/Globulin Ratio 2.0    BUN/Creatinine Ratio 23.0      CBC w/diff          4/3/2025    09:18   CBC w/Diff   WBC 4.84    RBC 4.18    Hemoglobin 11.7    Hematocrit 36.1    MCV 86.4    MCH 28.0    MCHC 32.4    RDW 12.8    Platelets 196    Neutrophil Rel % 48.9    Lymphocyte Rel % 38.2    Monocyte Rel % 10.7    Eosinophil Rel % 1.4    Basophil Rel % 0.6      Lipid Panel          4/3/2025    09:18   Lipid Panel   Total Cholesterol 143    Triglycerides 97    HDL Cholesterol 42    VLDL Cholesterol 18    LDL Cholesterol  83      TSH          4/3/2025    09:18   TSH   TSH 2.200      A1C Last 3 Results          4/3/2025    09:18   HGBA1C Last 3 Results   Hemoglobin A1C 5.30      Data reviewed : recent PCP note          Brief Urine Lab Results  (Last result in the past 365 days)        Color   Clarity   Blood   Leuk Est   Nitrite   Protein   CREAT   Urine HCG        04/29/25 0758 Yellow   Clear   Large   Large (3+)   Positive   300 mg/dL                     ASSESSMENT & PLAN        Frequency of urination  - Reports burning sensation when urinating and increased frequency for 2 days  - Urinalysis shows elevated WBC, nitrites, protein, and large blood, indicating UTI  - Recent blood work indicates normal kidney function  - Discussed using over-the-counter AZO or Pyridium for pain management, warning it may turn urine orange  - Urine culture to  ensure bacteria sensitivity to prescribed antibiotic  - Prescription for Macrobid 100 mg sent to [facility] on [address], to be taken twice daily for 7 days  - Encouraged to increase water intake   - Follow-up urine test scheduled 1 week after completing antibiotic course to check for resolution of hematuria  - If symptoms do not improve within 3 days, inform office  - Antibiotic will be changed based on culture results if necessary  Orders:    POCT urinalysis dipstick, automated    Urinalysis With Culture If Indicated -    Hematuria, unspecified type    Orders:    Urinalysis With Microscopic - Urine, Clean Catch      Assessment & Plan          Health Maintenance Due   Topic Date Due    DXA SCAN  05/10/2023    COVID-19 Vaccine (8 - 2024-25 season) 03/29/2025        Follow Up  No follow-ups on file.    Patient/family had no further questions at this time and verbalized understanding of the plan discussed today.     Patient or patient representative verbalized consent for the use of Ambient Listening during the visit with  Charbel Moore MD for chart documentation. 4/29/2025  08:12 EDT

## 2025-05-02 LAB
APPEARANCE UR: ABNORMAL
BACTERIA #/AREA URNS HPF: ABNORMAL /[HPF]
BACTERIA UR CULT: ABNORMAL
BACTERIA UR CULT: ABNORMAL
BILIRUB UR QL STRIP: NEGATIVE
CASTS URNS QL MICRO: ABNORMAL /LPF
COLOR UR: YELLOW
EPI CELLS #/AREA URNS HPF: ABNORMAL /HPF (ref 0–10)
GLUCOSE UR QL STRIP: NEGATIVE
HGB UR QL STRIP: ABNORMAL
KETONES UR QL STRIP: NEGATIVE
LEUKOCYTE ESTERASE UR QL STRIP: ABNORMAL
MICRO URNS: ABNORMAL
MUCOUS THREADS URNS QL MICRO: PRESENT
NITRITE UR QL STRIP: POSITIVE
OTHER ANTIBIOTIC SUSC ISLT: ABNORMAL
PH UR STRIP: 6.5 [PH] (ref 5–7.5)
PROT UR QL STRIP: ABNORMAL
RBC #/AREA URNS HPF: ABNORMAL /HPF (ref 0–2)
SP GR UR STRIP: 1.01 (ref 1–1.03)
URINALYSIS REFLEX: ABNORMAL
UROBILINOGEN UR STRIP-MCNC: 0.2 MG/DL (ref 0.2–1)
WBC #/AREA URNS HPF: >30 /HPF (ref 0–5)

## 2025-05-13 LAB
APPEARANCE UR: CLEAR
BACTERIA #/AREA URNS HPF: NORMAL /HPF
BILIRUB UR QL STRIP: NEGATIVE
CASTS URNS MICRO: NORMAL
COLOR UR: YELLOW
EPI CELLS #/AREA URNS HPF: NORMAL /HPF
GLUCOSE UR QL STRIP: NEGATIVE
HGB UR QL STRIP: NEGATIVE
KETONES UR QL STRIP: NEGATIVE
LEUKOCYTE ESTERASE UR QL STRIP: NEGATIVE
NITRITE UR QL STRIP: NEGATIVE
PH UR STRIP: 6 [PH] (ref 5–8)
PROT UR QL STRIP: NEGATIVE
RBC #/AREA URNS HPF: NORMAL /HPF
SP GR UR STRIP: 1.01 (ref 1–1.03)
UROBILINOGEN UR STRIP-MCNC: NORMAL MG/DL
WBC #/AREA URNS HPF: NORMAL /HPF

## 2025-08-18 RX ORDER — ROSUVASTATIN CALCIUM 5 MG/1
5 TABLET, COATED ORAL DAILY
Qty: 90 TABLET | Refills: 0 | Status: SHIPPED | OUTPATIENT
Start: 2025-08-18

## (undated) DEVICE — TUBING, SUCTION, 1/4" X 10', STRAIGHT: Brand: MEDLINE

## (undated) DEVICE — BITEBLOCK OMNI BLOC

## (undated) DEVICE — Device: Brand: DEFENDO AIR/WATER/SUCTION AND BIOPSY VALVE

## (undated) DEVICE — CANNULA,ADULT,SOFT-TOUCH,7TUBE,SC: Brand: MEDLINE